# Patient Record
Sex: FEMALE | Race: WHITE | NOT HISPANIC OR LATINO | Employment: FULL TIME | URBAN - METROPOLITAN AREA
[De-identification: names, ages, dates, MRNs, and addresses within clinical notes are randomized per-mention and may not be internally consistent; named-entity substitution may affect disease eponyms.]

---

## 2017-03-13 ENCOUNTER — OFFICE VISIT (OUTPATIENT)
Dept: PSYCHIATRY | Facility: CLINIC | Age: 27
End: 2017-03-13
Payer: COMMERCIAL

## 2017-03-13 VITALS
SYSTOLIC BLOOD PRESSURE: 117 MMHG | HEIGHT: 62 IN | WEIGHT: 127.63 LBS | BODY MASS INDEX: 23.49 KG/M2 | DIASTOLIC BLOOD PRESSURE: 78 MMHG | HEART RATE: 79 BPM

## 2017-03-13 DIAGNOSIS — F41.8 DEPRESSION WITH ANXIETY: Primary | ICD-10-CM

## 2017-03-13 PROCEDURE — 90792 PSYCH DIAG EVAL W/MED SRVCS: CPT | Mod: S$GLB,,, | Performed by: PSYCHIATRY & NEUROLOGY

## 2017-03-13 PROCEDURE — 99999 PR PBB SHADOW E&M-NEW PATIENT-LVL II: CPT | Mod: PBBFAC,,, | Performed by: PSYCHIATRY & NEUROLOGY

## 2017-03-13 RX ORDER — FLUOXETINE HYDROCHLORIDE 20 MG/1
20 CAPSULE ORAL DAILY
Qty: 30 CAPSULE | Refills: 5 | Status: SHIPPED | OUTPATIENT
Start: 2017-03-13 | End: 2017-04-17 | Stop reason: SDUPTHER

## 2017-03-14 NOTE — PROGRESS NOTES
I have independently evaluated the patient and have reviewed this note and agree with its contents, the assessment and plan.

## 2017-03-30 ENCOUNTER — PATIENT MESSAGE (OUTPATIENT)
Dept: PSYCHIATRY | Facility: CLINIC | Age: 27
End: 2017-03-30

## 2017-04-17 ENCOUNTER — OFFICE VISIT (OUTPATIENT)
Dept: PSYCHIATRY | Facility: CLINIC | Age: 27
End: 2017-04-17
Payer: COMMERCIAL

## 2017-04-17 VITALS
SYSTOLIC BLOOD PRESSURE: 120 MMHG | HEIGHT: 62 IN | HEART RATE: 81 BPM | WEIGHT: 120 LBS | BODY MASS INDEX: 22.08 KG/M2 | DIASTOLIC BLOOD PRESSURE: 73 MMHG

## 2017-04-17 DIAGNOSIS — F41.1 GAD (GENERALIZED ANXIETY DISORDER): Primary | ICD-10-CM

## 2017-04-17 PROCEDURE — 99214 OFFICE O/P EST MOD 30 MIN: CPT | Mod: S$GLB,,, | Performed by: PSYCHIATRY & NEUROLOGY

## 2017-04-17 PROCEDURE — 99999 PR PBB SHADOW E&M-EST. PATIENT-LVL II: CPT | Mod: PBBFAC,,, | Performed by: PSYCHIATRY & NEUROLOGY

## 2017-04-17 RX ORDER — BUPROPION HYDROCHLORIDE 75 MG/1
75 TABLET ORAL DAILY
Qty: 30 TABLET | Refills: 5 | Status: SHIPPED | OUTPATIENT
Start: 2017-04-17 | End: 2017-06-19 | Stop reason: SDUPTHER

## 2017-04-17 RX ORDER — FLUOXETINE HYDROCHLORIDE 20 MG/1
20 CAPSULE ORAL DAILY
Qty: 30 CAPSULE | Refills: 5 | Status: SHIPPED | OUTPATIENT
Start: 2017-04-17 | End: 2017-06-19 | Stop reason: SDUPTHER

## 2017-04-17 NOTE — PROGRESS NOTES
"Outpatient Psychiatry Initial Visit (MD/NP)    4/17/2017    Linda Rich, a 26 y.o. female, presenting for initial evaluation visit. Met with patient.    Reason for Encounter: self-referral. Patient complains of generalized anxiety. Pt here for follow up.     History of Present Illness: Pt states she is feeling good on prozac, now stating she is unable to reach orgasm. Is still sexually interested otherwise. States otherwise physical feelings of anxiety are "much less" and feels more relaxed and able to relax and enjoy herself when appropriate. Willing to try wellbutrin for sexual side effects.     Pt now anxious about having to go back to work tmrw after easter break. Would run through scenarios of things going wrong with work, her routine (she needs routine), once these started she would spiral into anxiety about everything from dog to random concerns. Reviewed CBT to address this.     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 No family psych hx. No hx of severe depression, kimberlee, no ADHD, no OCD.   Medical hx: none, no head injuries  Allergies: NKDA  Trauma: none  Legal: none  Substance: one drink on weekends, no tobacco, in HS tried marijuana but not currently using.       Review Of Systems:     Psychiatric Review Of Systems - Is patient experiencing or having changes in:  sleep: no  appetite: no, no ED behaviors  weight: no  energy/anergy: yes, pt states she is often sleepy  interest/pleasure/anhedonia: yes, not now  somatic symptoms: yes, stomach pains  libido: yes, decreased when anxious  anxiety/panic: yes  guilty/hopelessness: yes, not now  concentration: no  S.I.B.s/risky behavior: no  Irritability: yes  Racing thoughts: no  Impulsive behaviors: no  Paranoia:no  AVH:no      Current Evaluation: " "    Nutritional Screening: Considering the patient's height and weight, medications, medical history and preferences, should a referral be made to the dietitian? no    Constitutional  Vitals:  Most recent vital signs, dated less than 90 days prior to this appointment, were reviewed.    Vitals:    04/17/17 1041   BP: 120/73   Pulse: 81   Weight: 54.4 kg (120 lb)   Height: 5' 2" (1.575 m)        General:  unremarkable, age appropriate     Musculoskeletal  Muscle Strength/Tone:  no dyskinesia, no dystonia   Gait & Station:  non-ataxic     Psychiatric  Speech:  no latency; no press   Mood & Affect:  steady  congruent and appropriate   Thought Process:  normal and logical   Associations:  intact   Thought Content:  normal, no suicidality, no homicidality, delusions, or paranoia   Insight:  intact   Judgement: behavior is adequate to circumstances   Orientation:  grossly intact   Memory: intact for content of interview   Language: grossly intact   Attention Span & Concentration:  able to focus   Fund of Knowledge:  intact and appropriate to age and level of education       Relevant Elements of Neurological Exam: normal gait      Laboratory Data  No results found for any previous visit.      Medications  Outpatient Encounter Prescriptions as of 4/17/2017   Medication Sig Dispense Refill    fluoxetine (PROZAC) 20 MG capsule Take 1 capsule (20 mg total) by mouth once daily. 30 capsule 5     No facility-administered encounter medications on file as of 4/17/2017.            Assessment - Diagnosis - Goals:     Impression: Anxiety with depression, in partial remission.       Strengths and Liabilities: Strength: Patient accepts guidance/feedback, Strength: Patient is expressive/articulate., Strength: Patient is intelligent., Strength: Patient is motivated for change., Strength: Patient is physically healthy.    Treatment Goals:  Specify outcomes written in observable, behavioral terms:   Anxiety: acquiring relapse prevention " skills, reducing negative automatic thoughts and reducing physical symptoms of anxiety    Treatment Plan/Recommendations:   - continue prozac 20mg PO daily.  Discussed potential for GI side effects, sexual dysfunction, mood destabilization, headaches.   - Add wellbutrin 75mg for sexual side effects.   - Discussed CBT, pt interested and has never tried therapy, has started with CBT textbook.   - Discussed resident transition.     Return to Clinic: 6 weeks    Counseling time: 20  Total time: 50  Consulting clinician was informed of the encounter and consult note.

## 2017-04-18 NOTE — PROGRESS NOTES
This encounter was reviewed by me and case was discussed with the resident physician. I agree with the assessment and  treatment plan as stated, apart from title of initial eval. It was a follow up visit.

## 2017-06-19 ENCOUNTER — OFFICE VISIT (OUTPATIENT)
Dept: PSYCHIATRY | Facility: CLINIC | Age: 27
End: 2017-06-19
Payer: COMMERCIAL

## 2017-06-19 VITALS
BODY MASS INDEX: 21.9 KG/M2 | HEART RATE: 90 BPM | WEIGHT: 119 LBS | HEIGHT: 62 IN | SYSTOLIC BLOOD PRESSURE: 115 MMHG | DIASTOLIC BLOOD PRESSURE: 74 MMHG

## 2017-06-19 DIAGNOSIS — F41.8 ANXIETY ASSOCIATED WITH DEPRESSION: Primary | ICD-10-CM

## 2017-06-19 PROCEDURE — 99999 PR PBB SHADOW E&M-EST. PATIENT-LVL II: CPT | Mod: PBBFAC,,, | Performed by: PSYCHIATRY & NEUROLOGY

## 2017-06-19 PROCEDURE — 99214 OFFICE O/P EST MOD 30 MIN: CPT | Mod: S$GLB,,, | Performed by: PSYCHIATRY & NEUROLOGY

## 2017-06-19 RX ORDER — FLUOXETINE HYDROCHLORIDE 20 MG/1
20 CAPSULE ORAL DAILY
Qty: 30 CAPSULE | Refills: 5 | Status: SHIPPED | OUTPATIENT
Start: 2017-06-19 | End: 2017-10-13 | Stop reason: SDUPTHER

## 2017-06-19 RX ORDER — BUPROPION HYDROCHLORIDE 75 MG/1
75 TABLET ORAL DAILY
Qty: 30 TABLET | Refills: 5 | Status: SHIPPED | OUTPATIENT
Start: 2017-06-19 | End: 2017-10-13 | Stop reason: SINTOL

## 2017-06-19 NOTE — PROGRESS NOTES
Outpatient Psychiatry follow up Visit (MD/NP)    6/19/2017    Linda Rich, a 27 y.o. female, presenting for follow up. Met with patient.    Reason for Encounter: self-referral. Patient complains of generalized anxiety. Pt here for follow up.     History of Present Illness:  At last visit pt was started on wellbutrin 75mg to address anorgasmia 2/2 prozac, was otherwise finding prozac to be very helpful for her generalized anxiety. Pt states wellbutrin initially caused minor tremor which resolved, now feels it is helping very much with her anorgasmia and also is improving her energy levels.     Pt is currently on summer vacation from school, just went on a group vacation with 11 people which she thought would be socially stressful but it was not. In general has found CBT concepts to be helpful for her anxiety.     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 No family psych hx. No hx of severe depression, kimberlee, no ADHD, no OCD.   Medical hx: none, no head injuries  Allergies: NKDA  Trauma: none  Legal: none  Substance: one drink on weekends, no tobacco, in HS tried marijuana but not currently using.       Review Of Systems:     Psychiatric Review Of Systems - Is patient experiencing or having changes in:  sleep: no  appetite: no, no ED behaviors  weight: no  energy/anergy: yes, pt states she is often sleepy  interest/pleasure/anhedonia: yes, not now  somatic symptoms: yes, stomach pains  libido: yes, decreased when anxious  anxiety/panic: yes  guilty/hopelessness: yes, not now  concentration: no  S.I.B.s/risky behavior: no  Irritability: yes  Racing thoughts: no  Impulsive behaviors: no  Paranoia:no  AVH:no      Current Evaluation:     Nutritional Screening: Considering the patient's height and weight, medications,  "medical history and preferences, should a referral be made to the dietitian? no    Constitutional  Vitals:  Most recent vital signs, dated less than 90 days prior to this appointment, were reviewed.    Vitals:    06/19/17 1032   BP: 115/74   Pulse: 90   Weight: 54 kg (119 lb)   Height: 5' 2" (1.575 m)        General:  unremarkable, age appropriate     Musculoskeletal  Muscle Strength/Tone:  no dyskinesia, no dystonia   Gait & Station:  non-ataxic     Psychiatric  Speech:  no latency; no press   Mood & Affect:  steady  congruent and appropriate   Thought Process:  normal and logical   Associations:  intact   Thought Content:  normal, no suicidality, no homicidality, delusions, or paranoia   Insight:  intact   Judgement: behavior is adequate to circumstances   Orientation:  grossly intact   Memory: intact for content of interview   Language: grossly intact   Attention Span & Concentration:  able to focus   Fund of Knowledge:  intact and appropriate to age and level of education       Relevant Elements of Neurological Exam: normal gait      Laboratory Data  No visits with results within 1 Month(s) from this visit.   Latest known visit with results is:   No results found for any previous visit.         Medications  Outpatient Encounter Prescriptions as of 6/19/2017   Medication Sig Dispense Refill    buPROPion (WELLBUTRIN) 75 MG tablet Take 1 tablet (75 mg total) by mouth once daily. 30 tablet 5    fluoxetine (PROZAC) 20 MG capsule Take 1 capsule (20 mg total) by mouth once daily. 30 capsule 5     No facility-administered encounter medications on file as of 6/19/2017.            Assessment - Diagnosis - Goals:     Impression: Anxiety with depression, in partial remission.       Strengths and Liabilities: Strength: Patient accepts guidance/feedback, Strength: Patient is expressive/articulate., Strength: Patient is intelligent., Strength: Patient is motivated for change., Strength: Patient is physically " healthy.    Treatment Goals:  Specify outcomes written in observable, behavioral terms:   Anxiety: acquiring relapse prevention skills, reducing negative automatic thoughts and reducing physical symptoms of anxiety    Treatment Plan/Recommendations:   - continue prozac 20mg PO daily.  Discussed potential for GI side effects, sexual dysfunction, mood destabilization, headaches.   - Add wellbutrin 75mg for sexual side effects.   - Discussed CBT, pt interested and has never tried therapy, has started with CBT textbook, will seek outside therapist.   - Discussed resident transition.     Return to Clinic: 6 weeks to 3 months    Counseling time: 20  Total time: 30  Consulting clinician was informed of the encounter and consult note.

## 2017-10-13 ENCOUNTER — OFFICE VISIT (OUTPATIENT)
Dept: PSYCHIATRY | Facility: CLINIC | Age: 27
End: 2017-10-13
Payer: COMMERCIAL

## 2017-10-13 VITALS
WEIGHT: 124.19 LBS | DIASTOLIC BLOOD PRESSURE: 72 MMHG | BODY MASS INDEX: 22.85 KG/M2 | HEART RATE: 88 BPM | SYSTOLIC BLOOD PRESSURE: 121 MMHG | HEIGHT: 62 IN

## 2017-10-13 DIAGNOSIS — F41.8 ANXIETY WITH DEPRESSION: Primary | ICD-10-CM

## 2017-10-13 DIAGNOSIS — F41.0 PANIC DISORDER WITHOUT AGORAPHOBIA WITH MILD PANIC ATTACKS: ICD-10-CM

## 2017-10-13 PROCEDURE — 99214 OFFICE O/P EST MOD 30 MIN: CPT | Mod: S$GLB,,, | Performed by: PSYCHIATRY & NEUROLOGY

## 2017-10-13 PROCEDURE — 99999 PR PBB SHADOW E&M-EST. PATIENT-LVL II: CPT | Mod: PBBFAC,,, | Performed by: PSYCHIATRY & NEUROLOGY

## 2017-10-13 RX ORDER — HYDROXYZINE PAMOATE 25 MG/1
25 CAPSULE ORAL EVERY 6 HOURS PRN
Qty: 60 CAPSULE | Refills: 1 | Status: SHIPPED | OUTPATIENT
Start: 2017-10-13 | End: 2017-10-13 | Stop reason: SDUPTHER

## 2017-10-13 RX ORDER — FLUOXETINE 10 MG/1
10 CAPSULE ORAL DAILY
Qty: 30 CAPSULE | Refills: 1 | Status: SHIPPED | OUTPATIENT
Start: 2017-10-13 | End: 2017-10-13 | Stop reason: SDUPTHER

## 2017-10-13 RX ORDER — BUPROPION HYDROCHLORIDE 150 MG/1
150 TABLET ORAL DAILY
Qty: 30 TABLET | Refills: 2 | Status: SHIPPED | OUTPATIENT
Start: 2017-10-13 | End: 2018-01-24 | Stop reason: SDUPTHER

## 2017-10-13 RX ORDER — HYDROXYZINE PAMOATE 25 MG/1
25 CAPSULE ORAL EVERY 6 HOURS PRN
Qty: 60 CAPSULE | Refills: 2 | Status: SHIPPED | OUTPATIENT
Start: 2017-10-13 | End: 2020-05-28

## 2017-10-13 RX ORDER — BUPROPION HYDROCHLORIDE 150 MG/1
150 TABLET ORAL DAILY
Qty: 30 TABLET | Refills: 1 | Status: SHIPPED | OUTPATIENT
Start: 2017-10-13 | End: 2017-10-13 | Stop reason: SDUPTHER

## 2017-10-13 RX ORDER — FLUOXETINE HYDROCHLORIDE 20 MG/1
40 CAPSULE ORAL DAILY
Qty: 30 CAPSULE | Refills: 1 | Status: SHIPPED | OUTPATIENT
Start: 2017-10-13 | End: 2017-10-13 | Stop reason: SDUPTHER

## 2017-10-13 RX ORDER — FLUOXETINE HYDROCHLORIDE 20 MG/1
40 CAPSULE ORAL DAILY
Qty: 30 CAPSULE | Refills: 2 | Status: SHIPPED | OUTPATIENT
Start: 2017-10-13 | End: 2017-10-13 | Stop reason: SDUPTHER

## 2017-10-13 RX ORDER — FLUOXETINE HYDROCHLORIDE 20 MG/1
20 CAPSULE ORAL DAILY
Qty: 30 CAPSULE | Refills: 2 | Status: SHIPPED | OUTPATIENT
Start: 2017-10-13 | End: 2018-02-16 | Stop reason: SDUPTHER

## 2017-10-13 RX ORDER — FLUOXETINE 10 MG/1
10 CAPSULE ORAL DAILY
Qty: 30 CAPSULE | Refills: 2 | Status: SHIPPED | OUTPATIENT
Start: 2017-10-13 | End: 2018-03-09 | Stop reason: DRUGHIGH

## 2017-10-14 PROBLEM — F41.0 PANIC DISORDER WITHOUT AGORAPHOBIA WITH MILD PANIC ATTACKS: Status: ACTIVE | Noted: 2017-10-14

## 2017-10-14 PROBLEM — F41.8 ANXIETY WITH DEPRESSION: Status: ACTIVE | Noted: 2017-10-14

## 2017-10-14 NOTE — PROGRESS NOTES
"Outpatient Psychiatry follow up Visit (MD/NP)    10/13/2017    Linda Rich, a 27 y.o. female, presenting for follow up. Met with patient.    Reason for Encounter: self-referral. Patient complains of generalized anxiety. Pt here for follow up.     History of Present Illness:    Pt transferred from Dr. Cotter to Dr. Day pursuant to resident transition.  Chart reviewed prior to visit.      At last visit, pt was continued on Prozac 20 mg qd for anxiety and depression and Wellbutrin 75 mg for anorgasmia 2/2 SSRI.  Pt has been compliant with her medications without new SE/AE.  Pt has found CBT techniques helpful with managing her negative thoughts.      Pt reports increasing anxiety and panic attacks x1 month since school started again.  Pt is a  at [employer].  She reports "up and down" mood and feels the medications are not working as well.  Pt has been feeling "really tired and down."  Denies possibility of pregnancy.  Pt with h/o NSSIB.  Reports feeling "like I did when I used to cut" but denies urge to cut or recent cutting.  Pt last self-harmed in high school with cuts to arms and thighs.  Pt had only a few panic attacks over the summer.  She describes her first panic attack in over a month which occurred 5 days ago triggered by work expectations.  She missed school the next day as a result of anxiety.  (+) tingling, dizziness, nausea, feeling out of control, crying and severe self-loathing.  Episodes last 1/2-1 hour and resolve with exhaustion and going to sleep.  Past triggers include crowded environments, stress related to perceived loss of control or not being to resolve situations immediately.  Pt sleeps "too much," eight hours nightly with 1-2 hour nap in the afternoon.  She is exhausted at the end of each work day.  Pt with h/o mild-moderate restrictive eating pattern during January-June 2017.  She admits to eating at least once a day and lost only three pounds in " "six months.  Denies bingeing or purging behavior or h/o severe weight loss.  Now, pt is practicing "intuitive eating" where nourishment is led by appetite.   Appetite is stable.  Denies SI, HI, AVH, paranoia.  Pt is requesting increase in Prozac but worries about increase in sexual side effects.      Per chart review:  Medical hx: none, no head injuries  Allergies: NKDA    Psychiatic hx:   No hx of severe depression, kimberlee, no ADHD, no OCD.   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     Legal: none    Review Of Systems:     Psychiatric Review Of Systems - Is patient experiencing or having changes in:  sleep: yes, hypersomnia  appetite: no, h/o restrictive ED behaviors  weight: no  energy/anergy: yes, tired  interest/pleasure/anhedonia: no  somatic symptoms: yes, stomach pains  libido: yes, decreased despite initial relief with Wellbutrin 75 mg qam  anxiety/panic: yes, increased x1 month  guilty/hopelessness: no  concentration: no  S.I.B.s/risky behavior: no  Irritability: denies  Racing thoughts: no  Impulsive behaviors: no  Paranoia: no  AVH: no    Current Evaluation:     Nutritional Screening: Considering the patient's height and weight, medications, medical history and preferences, should a referral be made to the dietitian? no    Constitutional  Vitals:  Most recent vital signs, dated less than 90 days prior to this appointment, were reviewed.    Vitals:    10/13/17 1603   BP: 121/72   Pulse: 88   Weight: 56.3 kg (124 lb 3.2 oz)   Height: 5' 2" (1.575 m)        General:  unremarkable, age appropriate     Musculoskeletal  Muscle Strength/Tone:  no dyskinesia, no " dystonia   Gait & Station:  non-ataxic     Psychiatric  Speech:  no latency; no press   Mood & Affect:  steady  congruent and appropriate   Thought Process:  normal and logical   Associations:  intact   Thought Content:  normal, no suicidality, no homicidality, delusions, or paranoia   Insight:  intact   Judgement: behavior is adequate to circumstances   Orientation:  grossly intact   Memory: intact for content of interview   Language: grossly intact   Attention Span & Concentration:  able to focus   Fund of Knowledge:  intact and appropriate to age and level of education       Relevant Elements of Neurological Exam: normal gait      Laboratory Data  No visits with results within 1 Month(s) from this visit.   Latest known visit with results is:   No results found for any previous visit.         Medications  Outpatient Encounter Prescriptions as of 10/13/2017   Medication Sig Dispense Refill    buPROPion (WELLBUTRIN XL) 150 MG TB24 tablet Take 1 tablet (150 mg total) by mouth once daily. 30 tablet 2    fluoxetine (PROZAC) 10 MG capsule Take 1 capsule (10 mg total) by mouth once daily. 30 capsule 2    fluoxetine (PROZAC) 20 MG capsule Take 1 capsule (20 mg total) by mouth once daily. 30 capsule 2    hydrOXYzine pamoate (VISTARIL) 25 MG Cap Take 1 capsule (25 mg total) by mouth every 6 (six) hours as needed (anxiety or insomnia). 60 capsule 2    [DISCONTINUED] buPROPion (WELLBUTRIN XL) 150 MG TB24 tablet Take 1 tablet (150 mg total) by mouth once daily. 30 tablet 1    [DISCONTINUED] buPROPion (WELLBUTRIN) 75 MG tablet Take 1 tablet (75 mg total) by mouth once daily. 30 tablet 5    [DISCONTINUED] fluoxetine (PROZAC) 10 MG capsule Take 1 capsule (10 mg total) by mouth once daily. 30 capsule 1    [DISCONTINUED] fluoxetine (PROZAC) 20 MG capsule Take 1 capsule (20 mg total) by mouth once daily. 30 capsule 5    [DISCONTINUED] fluoxetine (PROZAC) 20 MG capsule Take 2 capsules (40 mg total) by mouth once daily. 30  capsule 1    [DISCONTINUED] fluoxetine (PROZAC) 20 MG capsule Take 2 capsules (40 mg total) by mouth once daily. 30 capsule 2    [DISCONTINUED] hydrOXYzine pamoate (VISTARIL) 25 MG Cap Take 1 capsule (25 mg total) by mouth every 6 (six) hours as needed (anxiety or insomnia). 60 capsule 1     No facility-administered encounter medications on file as of 10/13/2017.            Assessment - Diagnosis - Goals:     Impression: Anxiety with depression, in partial remission.       Strengths and Liabilities: Strength: Patient accepts guidance/feedback, Strength: Patient is expressive/articulate., Strength: Patient is intelligent., Strength: Patient is motivated for change., Strength: Patient is physically healthy.    Treatment Goals:  Specify outcomes written in observable, behavioral terms:   Anxiety: acquiring relapse prevention skills, reducing negative automatic thoughts and reducing physical symptoms of anxiety    Treatment Plan/Recommendations:   - continue prozac 20mg PO daily.  Discussed potential for GI side effects, sexual dysfunction, mood destabilization, headaches.   - Add wellbutrin 75mg for sexual side effects.   - Discussed CBT, pt interested and has never tried therapy, has started with CBT textbook, will seek outside therapist.   - Discussed resident transition.     Return to Clinic: 6 weeks to 3 months    Counseling time: 20  Total time: 30  Consulting clinician was informed of the encounter and consult note.

## 2017-10-15 NOTE — PROGRESS NOTES
"Outpatient Psychiatry follow up Visit (MD/NP)    10/13/2017    Linda Rich, a 27 y.o. female, presenting for follow up. Met with patient.    Reason for Encounter:  Self-referral. Patient complains of generalized anxiety. Pt here for follow-up.     History of Present Illness:    Pt transferred from Dr. Cotter to Dr. Day pursuant to resident transition.  Chart reviewed prior to visit.      At last visit, pt was continued on Prozac 20 mg qd for anxiety and depression and Wellbutrin 75 mg for anorgasmia 2/2 SSRI.  Pt has been compliant with her medications without new SE/AE.  Pt has found CBT techniques helpful with managing her negative thoughts.      Pt reports increasing anxiety and panic attacks x1 month since school started again.  Pt is a  at [employer].  She reports "up and down" mood and feels the medications are not working as well.  Pt has been feeling "really tired and down."  Denies possibility of pregnancy.  Pt with h/o NSSIB.  Reports feeling "like I did when I used to cut" but denies urge to cut or recent cutting.  Pt last self-harmed in high school with cuts to arms and thighs.  Pt had only a few panic attacks over the summer.  She describes her first panic attack in over a month which occurred 5 days ago triggered by work expectations.  She missed school the next day as a result of anxiety.  (+) tingling, dizziness, nausea, feeling out of control, crying and severe self-loathing.  Episodes last 1/2-1 hour and resolve with exhaustion and going to sleep.  Past triggers include crowded environments, stress related to perceived loss of control or not being to resolve situations immediately.  Pt sleeps "too much," eight hours nightly with 1-2 hour nap in the afternoon.  She is exhausted at the end of each work day.  Pt with h/o mild-moderate restrictive eating pattern during January-June 2017.  She admits to eating at least once a day and lost only three pounds in " "six months.  Denies bingeing or purging behavior or h/o severe weight loss.  Now, pt is practicing "intuitive eating" where nourishment is led by appetite.   Appetite is stable.  Denies SI, HI, AVH, paranoia.  Pt is requesting increase in Prozac but worries about increase in sexual side effects.  Pt reports extreme exhaustion when Wellbutrin "seems to wear off" and needing to nap or drink coffee in the evening just to function (denies moodiness).      Per chart review:  Medical hx: none, no head injuries  Allergies: NKDA    Psychiatic hx:   No hx of severe depression, kimberlee, no ADHD, no OCD.   No psych hx or contacts  (+) NSSIB in high school  No hx active SI  No psych meds  Trauma: none    Social hx:   Lives with , from [facility] in education, no learning problems  Works as  at [email or web address]  No kids, no plans for kids any time soon    Family psych hx: none    Substance: one drink on weekends, no tobacco, in HS tried marijuana but not currently using.     Legal: none    Review Of Systems:     Psychiatric Review Of Systems - Is patient experiencing or having changes in:  sleep: yes, hypersomnia  appetite: no, h/o restrictive ED behaviors  weight: no  energy/anergy: yes, tired  interest/pleasure/anhedonia: no  somatic symptoms: yes, stomach pains  libido: yes, decreased despite initial relief with Wellbutrin 75 mg qam  anxiety/panic: yes, increased x1 month  guilty/hopelessness: no  concentration: no  S.I.B.s/risky behavior: no  Irritability: denies  Racing thoughts: no  Impulsive behaviors: no  Paranoia: no  AVH: no    Current Evaluation:     Constitutional  Vitals:  Most recent vital signs, dated less than 90 days prior to this appointment, were reviewed.    Vitals:    10/13/17 1603   BP: 121/72   Pulse: 88   Weight: 56.3 kg (124 lb 3.2 oz)   Height: 5' 2" (1.575 m)        General:  unremarkable, age appropriate     Musculoskeletal  Muscle Strength/Tone:  no dyskinesia, no dystonia " "  Gait & Station:  non-ataxic     Psychiatric  Speech:  no latency; no press   Mood & Affect:  "up and down"  congruent and appropriate   Thought Process:  normal and logical   Associations:  intact   Thought Content:  normal, no suicidality, no homicidality, delusions, or paranoia   Insight:  intact   Judgement: behavior is adequate to circumstances   Orientation:  grossly intact   Memory: intact for content of interview   Language: grossly intact   Attention Span & Concentration:  able to focus   Fund of Knowledge:  intact and appropriate to age and level of education       Laboratory Data  No lab results in the past 90 days.    Medications  Outpatient Encounter Prescriptions as of 10/13/2017   Medication Sig Dispense Refill    buPROPion (WELLBUTRIN XL) 150 MG TB24 tablet Take 1 tablet (150 mg total) by mouth once daily. 30 tablet 2    fluoxetine (PROZAC) 10 MG capsule Take 1 capsule (10 mg total) by mouth once daily. 30 capsule 2    fluoxetine (PROZAC) 20 MG capsule Take 1 capsule (20 mg total) by mouth once daily. 30 capsule 2    hydrOXYzine pamoate (VISTARIL) 25 MG Cap Take 1 capsule (25 mg total) by mouth every 6 (six) hours as needed (anxiety or insomnia). 60 capsule 2    [DISCONTINUED] buPROPion (WELLBUTRIN XL) 150 MG TB24 tablet Take 1 tablet (150 mg total) by mouth once daily. 30 tablet 1    [DISCONTINUED] buPROPion (WELLBUTRIN) 75 MG tablet Take 1 tablet (75 mg total) by mouth once daily. 30 tablet 5    [DISCONTINUED] fluoxetine (PROZAC) 10 MG capsule Take 1 capsule (10 mg total) by mouth once daily. 30 capsule 1    [DISCONTINUED] fluoxetine (PROZAC) 20 MG capsule Take 1 capsule (20 mg total) by mouth once daily. 30 capsule 5    [DISCONTINUED] fluoxetine (PROZAC) 20 MG capsule Take 2 capsules (40 mg total) by mouth once daily. 30 capsule 1    [DISCONTINUED] fluoxetine (PROZAC) 20 MG capsule Take 2 capsules (40 mg total) by mouth once daily. 30 capsule 2    [DISCONTINUED] hydrOXYzine pamoate " (VISTARIL) 25 MG Cap Take 1 capsule (25 mg total) by mouth every 6 (six) hours as needed (anxiety or insomnia). 60 capsule 1     No facility-administered encounter medications on file as of 10/13/2017.        Assessment - Diagnosis - Goals:     Impression: Anxiety with depression, currently active    Diagnoses:  Encounter Diagnoses   Name Primary?    Anxiety with depression Yes    Panic disorder without agoraphobia with mild panic attacks      Treatment Plan/Recommendations:   - Increase Prozac from 20 mg to 30 mg daily for anxiety and depression.  Discussed potential for GI side effects, sexual dysfunction, mood destabilization, headaches.   - Increase Wellbutrin 75 mg qd for sexual side effects to Wellbutrin  mg qd for sexual side effects, depression and fatigue.   - Psychotherapy referral for anxiety and depression.    Return to Clinic: 6 weeks    Jose Day MD  [facility]  PGY-3  Pager:  [phone]

## 2017-11-17 NOTE — PROGRESS NOTES
STAFF COMMENTS: I have discussed pt with Dr. Day and reviewed the history and exam. I agree and concur with the assessment and plan.  Agree with increase of wellbutrin and prozac.

## 2018-01-19 RX ORDER — BUPROPION HYDROCHLORIDE 150 MG/1
150 TABLET ORAL DAILY
Qty: 30 TABLET | Refills: 2 | Status: CANCELLED | OUTPATIENT
Start: 2018-01-19 | End: 2019-01-19

## 2018-01-26 RX ORDER — BUPROPION HYDROCHLORIDE 150 MG/1
150 TABLET ORAL DAILY
Qty: 30 TABLET | Refills: 2 | Status: SHIPPED | OUTPATIENT
Start: 2018-01-26 | End: 2018-03-09 | Stop reason: SDUPTHER

## 2018-01-26 RX ORDER — BUPROPION HYDROCHLORIDE 150 MG/1
150 TABLET ORAL DAILY
Qty: 30 TABLET | Refills: 2 | Status: SHIPPED | OUTPATIENT
Start: 2018-01-26 | End: 2018-01-26 | Stop reason: SDUPTHER

## 2018-02-09 ENCOUNTER — OFFICE VISIT (OUTPATIENT)
Dept: URGENT CARE | Facility: CLINIC | Age: 28
End: 2018-02-09
Payer: COMMERCIAL

## 2018-02-09 VITALS
SYSTOLIC BLOOD PRESSURE: 110 MMHG | DIASTOLIC BLOOD PRESSURE: 72 MMHG | OXYGEN SATURATION: 98 % | HEIGHT: 63 IN | BODY MASS INDEX: 21.26 KG/M2 | WEIGHT: 120 LBS | HEART RATE: 90 BPM | RESPIRATION RATE: 18 BRPM | TEMPERATURE: 99 F

## 2018-02-09 DIAGNOSIS — J40 BRONCHITIS: Primary | ICD-10-CM

## 2018-02-09 DIAGNOSIS — J32.9 SINUSITIS, UNSPECIFIED CHRONICITY, UNSPECIFIED LOCATION: ICD-10-CM

## 2018-02-09 PROCEDURE — 3008F BODY MASS INDEX DOCD: CPT | Mod: S$GLB,,, | Performed by: PHYSICIAN ASSISTANT

## 2018-02-09 PROCEDURE — 99203 OFFICE O/P NEW LOW 30 MIN: CPT | Mod: S$GLB,,, | Performed by: PHYSICIAN ASSISTANT

## 2018-02-09 PROCEDURE — 96372 THER/PROPH/DIAG INJ SC/IM: CPT | Mod: S$GLB,,, | Performed by: FAMILY MEDICINE

## 2018-02-09 RX ORDER — CEFDINIR 300 MG/1
300 CAPSULE ORAL 2 TIMES DAILY
Qty: 14 CAPSULE | Refills: 0 | Status: SHIPPED | OUTPATIENT
Start: 2018-02-09 | End: 2018-02-16

## 2018-02-09 RX ORDER — FLUCONAZOLE 150 MG/1
150 TABLET ORAL ONCE
Qty: 1 TABLET | Refills: 0 | Status: SHIPPED | OUTPATIENT
Start: 2018-02-09 | End: 2018-02-09

## 2018-02-09 RX ORDER — BETAMETHASONE SODIUM PHOSPHATE AND BETAMETHASONE ACETATE 3; 3 MG/ML; MG/ML
9 INJECTION, SUSPENSION INTRA-ARTICULAR; INTRALESIONAL; INTRAMUSCULAR; SOFT TISSUE ONCE
Status: COMPLETED | OUTPATIENT
Start: 2018-02-09 | End: 2018-02-09

## 2018-02-09 RX ADMIN — BETAMETHASONE SODIUM PHOSPHATE AND BETAMETHASONE ACETATE 9 MG: 3; 3 INJECTION, SUSPENSION INTRA-ARTICULAR; INTRALESIONAL; INTRAMUSCULAR; SOFT TISSUE at 04:02

## 2018-02-09 NOTE — PROGRESS NOTES
"Subjective:       Patient ID: Linda Rich is a 27 y.o. female.    Vitals:  height is 5' 3" (1.6 m) and weight is 54.4 kg (120 lb). Her temperature is 98.5 °F (36.9 °C). Her blood pressure is 110/72 and her pulse is 90. Her respiration is 18 and oxygen saturation is 98%.     Chief Complaint: URI    URI    This is a new problem. The current episode started in the past 7 days. The problem has been gradually worsening. There has been no fever. Associated symptoms include chest pain, coughing, nausea, rhinorrhea, sinus pain, sneezing, a sore throat and wheezing. Pertinent negatives include no abdominal pain, congestion, diarrhea, ear pain, headaches, neck pain, rash or vomiting. She has tried nothing for the symptoms. The treatment provided no relief.     Review of Systems   Constitution: Positive for malaise/fatigue. Negative for chills and fever.   HENT: Positive for rhinorrhea, sinus pain, sneezing and sore throat. Negative for congestion, ear pain and hoarse voice.    Eyes: Negative for blurred vision, discharge, pain, redness and visual disturbance.   Cardiovascular: Positive for chest pain. Negative for dyspnea on exertion, leg swelling, near-syncope and syncope.   Respiratory: Positive for cough, shortness of breath, sputum production and wheezing.    Hematologic/Lymphatic: Negative for adenopathy.   Skin: Negative for itching and rash.   Musculoskeletal: Negative for back pain, myalgias, neck pain and stiffness.   Gastrointestinal: Positive for nausea. Negative for abdominal pain, diarrhea and vomiting.   Neurological: Negative for dizziness, headaches, light-headedness and numbness.   Psychiatric/Behavioral: Negative for altered mental status.   Allergic/Immunologic: Negative for hives.   All other systems reviewed and are negative.      Objective:      Physical Exam   Constitutional: She is oriented to person, place, and time. She appears well-developed and well-nourished.  Non-toxic appearance. She has " a sickly appearance. She does not appear ill. No distress.   HENT:   Head: Normocephalic and atraumatic.   Right Ear: Tympanic membrane, external ear and ear canal normal.   Left Ear: Tympanic membrane, external ear and ear canal normal.   Nose: Mucosal edema present. No epistaxis. Right sinus exhibits maxillary sinus tenderness. Right sinus exhibits no frontal sinus tenderness. Left sinus exhibits maxillary sinus tenderness. Left sinus exhibits no frontal sinus tenderness.   Mouth/Throat: Uvula is midline and mucous membranes are normal. No uvula swelling. Posterior oropharyngeal erythema present.   Eyes: Pupils are equal, round, and reactive to light.   Neck: Normal range of motion. Neck supple.   Cardiovascular: Normal rate, regular rhythm and normal heart sounds.  Exam reveals no gallop and no friction rub.    No murmur heard.  Pulmonary/Chest: Effort normal. No respiratory distress. She has no decreased breath sounds. She has no wheezes. She has rhonchi in the right middle field and the left middle field. She has no rales. She exhibits no tenderness and no crepitus.   Musculoskeletal: Normal range of motion.   Lymphadenopathy:        Head (right side): No submental, no submandibular, no tonsillar, no preauricular, no posterior auricular and no occipital adenopathy present.        Head (left side): No submental, no submandibular, no tonsillar, no preauricular, no posterior auricular and no occipital adenopathy present.     She has no cervical adenopathy.        Right cervical: No posterior cervical adenopathy present.       Left cervical: No posterior cervical adenopathy present.        Right: No supraclavicular adenopathy present.        Left: No supraclavicular adenopathy present.   Neurological: She is alert and oriented to person, place, and time. She is not disoriented. Coordination and gait normal.   Skin: No abrasion, no ecchymosis, no laceration and no rash noted. No erythema.   Psychiatric: She has a  normal mood and affect. Her behavior is normal.   Nursing note and vitals reviewed.      Assessment:       1. Bronchitis    2. Sinusitis, unspecified chronicity, unspecified location        Plan:         Bronchitis  -     POCT Influenza A/B  -     betamethasone acetate-betamethasone sodium phosphate injection 9 mg; Inject 1.5 mLs (9 mg total) into the muscle once.  -     cefdinir (OMNICEF) 300 MG capsule; Take 1 capsule (300 mg total) by mouth 2 (two) times daily.  Dispense: 14 capsule; Refill: 0  -     fluconazole (DIFLUCAN) 150 MG Tab; Take 1 tablet (150 mg total) by mouth once. AT THE END OF YOUR ANTIBIOTIC COURSE  Dispense: 1 tablet; Refill: 0    Sinusitis, unspecified chronicity, unspecified location  -     betamethasone acetate-betamethasone sodium phosphate injection 9 mg; Inject 1.5 mLs (9 mg total) into the muscle once.  -     cefdinir (OMNICEF) 300 MG capsule; Take 1 capsule (300 mg total) by mouth 2 (two) times daily.  Dispense: 14 capsule; Refill: 0

## 2018-02-09 NOTE — PATIENT INSTRUCTIONS
- Please return here or go to the Emergency Department for any concerns or worsening of condition.   - If you were prescribed antibiotics, please take them to completion.  - Please follow up with your primary care provider (PCP) or discussed specialist(s) as needed.           Sinusitis (Antibiotic Treatment)    The sinuses are air-filled spaces within the bones of the face. They connect to the inside of the nose. Sinusitis is an inflammation of the tissue lining the sinus cavity. Sinus inflammation can occur during a cold. It can also be due to allergies to pollens and other particles in the air. Sinusitis can cause symptoms of sinus congestion and fullness. A sinus infection causes fever, headache and facial pain. There is often green or yellow drainage from the nose or into the back of the throat (post-nasal drip). You have been given antibiotics to treat this condition.  Home care:  · Take the full course of antibiotics as instructed. Do not stop taking them, even if you feel better.  · Drink plenty of water, hot tea, and other liquids. This may help thin mucus. It also may promote sinus drainage.  · Heat may help soothe painful areas of the face. Use a towel soaked in hot water. Or,  the shower and direct the hot spray onto your face. Using a vaporizer along with a menthol rub at night may also help.   · An expectorant containing guaifenesin may help thin the mucus and promote drainage from the sinuses.  · Over-the-counter decongestants may be used unless a similar medicine was prescribed. Nasal sprays work the fastest. Use one that contains phenylephrine or oxymetazoline. First blow the nose gently. Then use the spray. Do not use these medicines more often than directed on the label or symptoms may get worse. You may also use tablets containing pseudoephedrine. Avoid products that combine ingredients, because side effects may be increased. Read labels. You can also ask the pharmacist for help.  (NOTE: Persons with high blood pressure should not use decongestants. They can raise blood pressure.)  · Over-the-counter antihistamines may help if allergies contributed to your sinusitis.    · Do not use nasal rinses or irrigation during an acute sinus infection, unless told to by your health care provider. Rinsing may spread the infection to other sinuses.  · Use acetaminophen or ibuprofen to control pain, unless another pain medicine was prescribed. (If you have chronic liver or kidney disease or ever had a stomach ulcer, talk with your doctor before using these medicines. Aspirin should never be used in anyone under 18 years of age who is ill with a fever. It may cause severe liver damage.)  · Don't smoke. This can worsen symptoms.  Follow-up care  Follow up with your healthcare provider or our staff if you are not improving within the next week.  When to seek medical advice  Call your healthcare provider if any of these occur:  · Facial pain or headache becoming more severe  · Stiff neck  · Unusual drowsiness or confusion  · Swelling of the forehead or eyelids  · Vision problems, including blurred or double vision  · Fever of 100.4ºF (38ºC) or higher, or as directed by your healthcare provider  · Seizure  · Breathing problems  · Symptoms not resolving within 10 days  Date Last Reviewed: 4/13/2015  © 7259-4626 [employer]. [address] [place] [address], [place] [address]. All rights reserved. This information is not intended as a substitute for professional medical care. Always follow your healthcare professional's instructions.      Acute Bronchitis  Your healthcare provider has told you that you have acute bronchitis. Bronchitis is infection or inflammation of the bronchial tubes (airways in the lungs). Normally, air moves easily in and out of the airways. Bronchitis narrows the airways, making it harder for air to flow in and out of the lungs. This causes symptoms such as shortness of breath, coughing  up yellow or green mucus, and wheezing. Bronchitis can be acute or chronic. Acute means the condition comes on quickly and goes away in a short time, usually within 3 to 10 days. Chronic means a condition lasts a long time and often comes back.    What causes acute bronchitis?  Acute bronchitis almost always starts as a viral respiratory infection, such as a cold or the flu. Certain factors make it more likely for a cold or flu to turn into bronchitis. These include being very young, being elderly, having a heart or lung problem, or having a weak immune system. Cigarette smoking also makes bronchitis more likely.  When bronchitis develops, the airways become swollen. The airways may also become infected with bacteria. This is known as a secondary infection.  Diagnosing acute bronchitis  Your healthcare provider will examine you and ask about your symptoms and health history. You may also have a sputum culture to test the fluid in your lungs. Chest X-rays may be done to look for infection in the lungs.  Treating acute bronchitis  Bronchitis usually clears up as the cold or flu goes away. You can help feel better faster by doing the following:  · Take medicine as directed. You may be told to take ibuprofen or other over-the-counter medicines. These help relieve inflammation in your bronchial tubes. Your healthcare provider may prescribe an inhaler to help open up the bronchial tubes. Most of the time, acute bronchitis is caused by a viral infection. Antibiotics are usually not prescribed for viral infections.  · Drink plenty of fluids, such as water, juice, or warm soup. Fluids loosen mucus so that you can cough it up. This helps you breathe more easily. Fluids also prevent dehydration.  · Make sure you get plenty of rest.  · Do not smoke. Do not allow anyone else to smoke in your home.  Recovery and follow-up  Follow up with your doctor as you are told. You will likely feel better in a week or two. But a dry cough  can linger beyond that time. Let your doctor know if you still have symptoms (other than a dry cough) after 2 weeks, or if youre prone to getting bronchial infections. Take steps to protect yourself from future infections. These steps include stopping smoking and avoiding tobacco smoke, washing your hands often, and getting a yearly flu shot.  When to call your healthcare provider  Call the healthcare provider if you have any of the following:  · Fever of 100.4°F (38.0°C) or higher, or as advised  · Symptoms that get worse, or new symptoms  · Trouble breathing  · Symptoms that dont start to improve within a week, or within 3 days of taking antibiotics   Date Last Reviewed: 12/1/2016  © 6871-1515 [employer]. [address] [place] [address], [place] [address]. All rights reserved. This information is not intended as a substitute for professional medical care. Always follow your healthcare professional's instructions.

## 2018-02-16 RX ORDER — FLUOXETINE HYDROCHLORIDE 20 MG/1
20 CAPSULE ORAL DAILY
Qty: 30 CAPSULE | Refills: 0 | Status: SHIPPED | OUTPATIENT
Start: 2018-02-16 | End: 2018-03-09 | Stop reason: SDUPTHER

## 2018-02-22 ENCOUNTER — OFFICE VISIT (OUTPATIENT)
Dept: URGENT CARE | Facility: CLINIC | Age: 28
End: 2018-02-22
Payer: COMMERCIAL

## 2018-02-22 VITALS
TEMPERATURE: 99 F | HEART RATE: 97 BPM | OXYGEN SATURATION: 100 % | BODY MASS INDEX: 20.73 KG/M2 | HEIGHT: 63 IN | RESPIRATION RATE: 18 BRPM | SYSTOLIC BLOOD PRESSURE: 102 MMHG | WEIGHT: 117 LBS | DIASTOLIC BLOOD PRESSURE: 69 MMHG

## 2018-02-22 DIAGNOSIS — R19.7 DIARRHEA, UNSPECIFIED TYPE: Primary | ICD-10-CM

## 2018-02-22 PROCEDURE — 99214 OFFICE O/P EST MOD 30 MIN: CPT | Mod: S$GLB,,, | Performed by: PHYSICIAN ASSISTANT

## 2018-02-22 PROCEDURE — 3008F BODY MASS INDEX DOCD: CPT | Mod: S$GLB,,, | Performed by: PHYSICIAN ASSISTANT

## 2018-02-22 RX ORDER — METRONIDAZOLE 500 MG/1
500 TABLET ORAL 3 TIMES DAILY
Qty: 42 TABLET | Refills: 0 | Status: SHIPPED | OUTPATIENT
Start: 2018-02-22 | End: 2018-03-09 | Stop reason: ALTCHOICE

## 2018-02-22 NOTE — LETTER
February 22, 2018      [facility]  [address]  [facility] [address]  Phone: [phone]  Fax: [phone]       Patient: Linda Rich   YOB: 1990  Date of Visit: 02/22/2018    To Whom It May Concern:    Yosi Rich  was at [facility] on 02/22/2018. She may return to work/school on 02/26/2018 with no restrictions. If you have any questions or concerns, or if I can be of further assistance, please do not hesitate to contact me.    Sincerely,        Ramila Kelley PA-C

## 2018-02-22 NOTE — PATIENT INSTRUCTIONS
- Rest.    - Drink plenty of fluids.    - Tylenol or Ibuprofen as directed as needed for fever/pain.    - Follow up with your PCP or specialty clinic as directed in the next 1-2 weeks if not improved or as needed.  You can call [phone] to schedule an appointment with the appropriate provider.    - Go to the ED if your symptoms worsen.    Uncertain Causes of Diarrhea (Adult)    Diarrhea is when stools are loose and watery. This can be caused by:  · Viral infections  · Bacterial infections  · Food poisoning  · Parasites  · Irritable bowel syndrome (IBS)  · Inflammatory bowel diseases such as ulcerative colitis, Crohn's disease, and celiac disease  · Food intolerance, such as to lactose, the sugar found in milk and milk products  · Reaction to medicines like antibiotics, laxatives, cancer drugs, and antacids  Along with diarrhea, you may also have:  · Abdominal pain and cramping  · Nausea and vomiting  · Loss of bowel control  · Fever and chills  · Bloody stools  In some cases, antibiotics may help to treat diarrhea. You may have a stool sample test. This is done to see what is causing your diarrhea, and if antibiotics will help treat it. The results of a stool sample test may take up to 2 days. The healthcare provider may not give you antibiotics until he or she has the stool test results.  Diarrhea can cause dehydration. This is the loss of too much water and other fluids from the body. When this occurs, body fluid must be replaced. This can be done with oral rehydration solutions. Oral rehydration solutions are available at drugstores and grocery stores without a prescription.  Home care  Follow all instructions given by your healthcare provider. Rest at home for the next 24 hours, or until you feel better. Avoid caffeine, tobacco, and alcohol. These can make diarrhea, cramping, and pain worse.  If taking medicines:  · Dont take over-the-counter diarrhea or nausea medicines unless your healthcare provider  tells you to.  · You may use acetaminophen or NSAID medicines like ibuprofen or naproxen to reduce pain and fever. Dont use these if you have chronic liver or kidney disease, or ever had a stomach ulcer or gastrointestinal bleeding. Don't use NSAID medicines if you are already taking one for another condition (like arthritis) or are on daily aspirin therapy (such as for heart disease or after a stroke). Talk with your healthcare provider first.  · If antibiotics were prescribed, be sure you take them until they are finished. Dont stop taking them even when you feel better. Antibiotics must be taken as a full course.  To prevent the spread of illness:  · Remember that washing with soap and water and using alcohol-based  is the best way to prevent the spread of infection.  · Clean the toilet after each use.  · Wash your hands before eating.  · Wash your hands before and after preparing food. Keep in mind that people with diarrhea or vomiting should not prepare food for others.  · Wash your hands after using cutting boards, countertops, and knives that have been in contact with raw foods.  · Wash and then peel fruits and vegetables.  · Keep uncooked meats away from cooked and ready-to-eat foods.  · Use a food thermometer when cooking. Cook poultry to at least 165°F (74°C). Cook ground meat (beef, veal, pork, lamb) to at least 160°F (71°C). Cook fresh beef, veal, lamb, and pork to at least 145°F (63°C).  · Dont eat raw or undercooked eggs (poached or odessa side up), poultry, meat, or unpasteurized milk and juices.  Food and drinks  The main goal while treating vomiting or diarrhea is to prevent dehydration. This is done by taking small amounts of liquids often.  · Keep in mind that liquids are more important than food right now.  · Drink only small amounts of liquids at a time.  · Dont force yourself to eat, especially if you are having cramping, vomiting, or diarrhea. Dont eat large amounts at a time,  even if you are hungry.  · If you eat, avoid fatty, greasy, spicy, or fried foods.  · Dont eat dairy foods or drink milk if you have diarrhea. These can make diarrhea worse.  During the first 24 hours you can try:  · Oral rehydration solutions. Do not use sports drinks. They have too much sugar and not enough electrolytes.  · Soft drinks without caffeine  · Ginger ale  · Water (plain or flavored)  · Decaf tea or coffee  · Clear broth, consommé, or bouillon  · Gelatin, popsicles, or frozen fruit juice bars  The second 24 hours, if you are feeling better, you can add:  · Hot cereal, plain toast, bread, rolls, or crackers  · Plain noodles, rice, mashed potatoes, chicken noodle soup, or rice soup  · Unsweetened canned fruit (no pineapple)  · Bananas  As you recover:  · Limit fat intake to less than 15 grams per day. Dont eat margarine, butter, oils, mayonnaise, sauces, gravies, fried foods, peanut butter, meat, poultry, or fish.  · Limit fiber. Dont eat raw or cooked vegetables, fresh fruits except bananas, or bran cereals.  · Limit caffeine and chocolate.  · Limit dairy.  · Dont use spices or seasonings except salt.  · Go back to your normal diet over time, as you feel better and your symptoms improve.  · If the symptoms come back, go back to a simple diet or clear liquids.  Follow-up care  Follow up with your healthcare provider, or as advised. If a stool sample was taken or cultures were done, call the healthcare provider for the results as instructed.  Call 911  Call 911 if you have any of these symptoms:  · Trouble breathing  · Confusion  · Extreme drowsiness or trouble walking  · Loss of consciousness  · Rapid heart rate  · Chest pain  · Stiff neck  · Seizure  When to seek medical advice  Call your healthcare provider right away if any of these occur:  · Abdominal pain that gets worse  · Constant lower right abdominal pain  · Continued vomiting and inability to keep liquids down  · Diarrhea more than 5 times a  day  · Blood in vomit or stool  · Dark urine or no urine for 8 hours, dry mouth and tongue, tiredness, weakness, or dizziness  · Drowsiness  · New rash  · You dont get better in 2 to 3 days  · Fever of 100.4°F (38°C) or higher that doesnt get lower with medicine  Date Last Reviewed: 1/3/2016  © 0010-0501 [employer]. [address] [place] [address], [place] [address]. All rights reserved. This information is not intended as a substitute for professional medical care. Always follow your healthcare professional's instructions.

## 2018-02-22 NOTE — PROGRESS NOTES
"Subjective:       Patient ID: Linda Rich is a 27 y.o. female.    Vitals:  height is 5' 3" (1.6 m) and weight is 53.1 kg (117 lb). Her oral temperature is 98.7 °F (37.1 °C). Her blood pressure is 102/69 and her pulse is 97. Her respiration is 18 and oxygen saturation is 100%.     Chief Complaint: Diarrhea    This is a 27 y.o. female with Past Medical History:  No date: Anxiety  No date: Depression   who presents today with a chief complaint of diarrhea. Patient was on Cefdinir 300mg for 5 days starting 2/10/18. She continues with diarrhea and abdominal cramping after stopping antibiotic. Taking Imodium.      Diarrhea    This is a new problem. The current episode started in the past 7 days. The problem occurs 2 to 4 times per day. The problem has been gradually improving. The stool consistency is described as watery. The patient states that diarrhea awakens her from sleep. Associated symptoms include abdominal pain (cramping prior to diarrhea). Pertinent negatives include no chills, fever or vomiting. Risk factors include recent antibiotic use. She has tried anti-motility drug for the symptoms. The treatment provided moderate relief.     Review of Systems   Constitution: Negative for chills and fever.   HENT: Negative for congestion.    Cardiovascular: Negative for chest pain.   Respiratory: Negative for shortness of breath.    Skin: Negative for rash.   Musculoskeletal: Negative for back pain.   Gastrointestinal: Positive for abdominal pain (cramping prior to diarrhea) and diarrhea. Negative for constipation, hematochezia, melena, nausea and vomiting.   Genitourinary: Negative for dysuria.   Neurological: Negative for dizziness and light-headedness.   Psychiatric/Behavioral: The patient is not nervous/anxious.        Objective:      Physical Exam   Constitutional: She is oriented to person, place, and time. She appears well-developed and well-nourished.   HENT:   Head: Normocephalic and atraumatic.   Eyes: " Conjunctivae are normal.   Neck: Normal range of motion. Neck supple. No thyromegaly present.   Cardiovascular: Normal rate and regular rhythm.  Exam reveals no gallop and no friction rub.    No murmur heard.  Pulmonary/Chest: Effort normal and breath sounds normal. She has no wheezes. She has no rales.   Abdominal: Soft. Bowel sounds are normal. She exhibits no distension. There is no tenderness.   Musculoskeletal: Normal range of motion.   Lymphadenopathy:     She has no cervical adenopathy.   Neurological: She is alert and oriented to person, place, and time.   Skin: Skin is warm and dry. No rash noted. No erythema.   Psychiatric: She has a normal mood and affect. Her behavior is normal. Judgment and thought content normal.   Nursing note and vitals reviewed.      11:19 AM - Patient unable to provide a stool sample here.  She was given the supplies to collect one at home and told to bring it back here.  She was given a prescription for flagyl to cover for c. Diff.  She was given precautions and instructed to go tot he ED for any worsening symptoms.      Assessment:       1. Diarrhea, unspecified type        Plan:         Diarrhea, unspecified type  -     CLOSTRIDIUM DIFFICILE  -     Stool culture  -     Stool Exam-Ova,Cysts,Parasites  -     metroNIDAZOLE (FLAGYL) 500 MG tablet; Take 1 tablet (500 mg total) by mouth 3 (three) times daily.  Dispense: 42 tablet; Refill: 0      Linda was seen today for diarrhea.    Diagnoses and all orders for this visit:    Diarrhea, unspecified type  -     CLOSTRIDIUM DIFFICILE  -     Stool culture  -     Stool Exam-Ova,Cysts,Parasites  -     metroNIDAZOLE (FLAGYL) 500 MG tablet; Take 1 tablet (500 mg total) by mouth 3 (three) times daily.      Patient Instructions   - Rest.    - Drink plenty of fluids.    - Tylenol or Ibuprofen as directed as needed for fever/pain.    - Follow up with your PCP or specialty clinic as directed in the next 1-2 weeks if not improved or as needed.   You can call [phone] to schedule an appointment with the appropriate provider.    - Go to the ED if your symptoms worsen.    Uncertain Causes of Diarrhea (Adult)    Diarrhea is when stools are loose and watery. This can be caused by:  · Viral infections  · Bacterial infections  · Food poisoning  · Parasites  · Irritable bowel syndrome (IBS)  · Inflammatory bowel diseases such as ulcerative colitis, Crohn's disease, and celiac disease  · Food intolerance, such as to lactose, the sugar found in milk and milk products  · Reaction to medicines like antibiotics, laxatives, cancer drugs, and antacids  Along with diarrhea, you may also have:  · Abdominal pain and cramping  · Nausea and vomiting  · Loss of bowel control  · Fever and chills  · Bloody stools  In some cases, antibiotics may help to treat diarrhea. You may have a stool sample test. This is done to see what is causing your diarrhea, and if antibiotics will help treat it. The results of a stool sample test may take up to 2 days. The healthcare provider may not give you antibiotics until he or she has the stool test results.  Diarrhea can cause dehydration. This is the loss of too much water and other fluids from the body. When this occurs, body fluid must be replaced. This can be done with oral rehydration solutions. Oral rehydration solutions are available at drugstores and grocery stores without a prescription.  Home care  Follow all instructions given by your healthcare provider. Rest at home for the next 24 hours, or until you feel better. Avoid caffeine, tobacco, and alcohol. These can make diarrhea, cramping, and pain worse.  If taking medicines:  · Dont take over-the-counter diarrhea or nausea medicines unless your healthcare provider tells you to.  · You may use acetaminophen or NSAID medicines like ibuprofen or naproxen to reduce pain and fever. Dont use these if you have chronic liver or kidney disease, or ever had a stomach ulcer or  gastrointestinal bleeding. Don't use NSAID medicines if you are already taking one for another condition (like arthritis) or are on daily aspirin therapy (such as for heart disease or after a stroke). Talk with your healthcare provider first.  · If antibiotics were prescribed, be sure you take them until they are finished. Dont stop taking them even when you feel better. Antibiotics must be taken as a full course.  To prevent the spread of illness:  · Remember that washing with soap and water and using alcohol-based  is the best way to prevent the spread of infection.  · Clean the toilet after each use.  · Wash your hands before eating.  · Wash your hands before and after preparing food. Keep in mind that people with diarrhea or vomiting should not prepare food for others.  · Wash your hands after using cutting boards, countertops, and knives that have been in contact with raw foods.  · Wash and then peel fruits and vegetables.  · Keep uncooked meats away from cooked and ready-to-eat foods.  · Use a food thermometer when cooking. Cook poultry to at least 165°F (74°C). Cook ground meat (beef, veal, pork, lamb) to at least 160°F (71°C). Cook fresh beef, veal, lamb, and pork to at least 145°F (63°C).  · Dont eat raw or undercooked eggs (poached or odessa side up), poultry, meat, or unpasteurized milk and juices.  Food and drinks  The main goal while treating vomiting or diarrhea is to prevent dehydration. This is done by taking small amounts of liquids often.  · Keep in mind that liquids are more important than food right now.  · Drink only small amounts of liquids at a time.  · Dont force yourself to eat, especially if you are having cramping, vomiting, or diarrhea. Dont eat large amounts at a time, even if you are hungry.  · If you eat, avoid fatty, greasy, spicy, or fried foods.  · Dont eat dairy foods or drink milk if you have diarrhea. These can make diarrhea worse.  During the first 24 hours you can  try:  · Oral rehydration solutions. Do not use sports drinks. They have too much sugar and not enough electrolytes.  · Soft drinks without caffeine  · Ginger ale  · Water (plain or flavored)  · Decaf tea or coffee  · Clear broth, consommé, or bouillon  · Gelatin, popsicles, or frozen fruit juice bars  The second 24 hours, if you are feeling better, you can add:  · Hot cereal, plain toast, bread, rolls, or crackers  · Plain noodles, rice, mashed potatoes, chicken noodle soup, or rice soup  · Unsweetened canned fruit (no pineapple)  · Bananas  As you recover:  · Limit fat intake to less than 15 grams per day. Dont eat margarine, butter, oils, mayonnaise, sauces, gravies, fried foods, peanut butter, meat, poultry, or fish.  · Limit fiber. Dont eat raw or cooked vegetables, fresh fruits except bananas, or bran cereals.  · Limit caffeine and chocolate.  · Limit dairy.  · Dont use spices or seasonings except salt.  · Go back to your normal diet over time, as you feel better and your symptoms improve.  · If the symptoms come back, go back to a simple diet or clear liquids.  Follow-up care  Follow up with your healthcare provider, or as advised. If a stool sample was taken or cultures were done, call the healthcare provider for the results as instructed.  Call 911  Call 911 if you have any of these symptoms:  · Trouble breathing  · Confusion  · Extreme drowsiness or trouble walking  · Loss of consciousness  · Rapid heart rate  · Chest pain  · Stiff neck  · Seizure  When to seek medical advice  Call your healthcare provider right away if any of these occur:  · Abdominal pain that gets worse  · Constant lower right abdominal pain  · Continued vomiting and inability to keep liquids down  · Diarrhea more than 5 times a day  · Blood in vomit or stool  · Dark urine or no urine for 8 hours, dry mouth and tongue, tiredness, weakness, or dizziness  · Drowsiness  · New rash  · You dont get better in 2 to 3 days  · Fever of  100.4°F (38°C) or higher that doesnt get lower with medicine  Date Last Reviewed: 1/3/2016  © 9223-7274 [employer]. [address] [place] [address], [place] [address]. All rights reserved. This information is not intended as a substitute for professional medical care. Always follow your healthcare professional's instructions.

## 2018-02-26 ENCOUNTER — HOSPITAL ENCOUNTER (EMERGENCY)
Facility: HOSPITAL | Age: 28
Discharge: HOME | End: 2018-02-26
Attending: EMERGENCY MEDICINE
Payer: COMMERCIAL

## 2018-02-26 VITALS
HEART RATE: 76 BPM | WEIGHT: 116 LBS | TEMPERATURE: 99 F | DIASTOLIC BLOOD PRESSURE: 63 MMHG | RESPIRATION RATE: 20 BRPM | OXYGEN SATURATION: 96 % | HEIGHT: 62 IN | SYSTOLIC BLOOD PRESSURE: 112 MMHG | BODY MASS INDEX: 21.35 KG/M2

## 2018-02-26 DIAGNOSIS — E86.0 DEHYDRATION: ICD-10-CM

## 2018-02-26 DIAGNOSIS — R11.0 NAUSEA: ICD-10-CM

## 2018-02-26 DIAGNOSIS — R19.7 DIARRHEA, UNSPECIFIED TYPE: Primary | ICD-10-CM

## 2018-02-26 LAB
ALBUMIN SERPL BCP-MCNC: 3.2 G/DL
ALP SERPL-CCNC: 52 U/L
ALT SERPL W/O P-5'-P-CCNC: 14 U/L
ANION GAP SERPL CALC-SCNC: 8 MMOL/L
AST SERPL-CCNC: 13 U/L
B-HCG UR QL: NEGATIVE
BACTERIA #/AREA URNS AUTO: ABNORMAL /HPF
BASOPHILS # BLD AUTO: 0.02 K/UL
BASOPHILS NFR BLD: 0.3 %
BILIRUB SERPL-MCNC: 0.6 MG/DL
BILIRUB UR QL STRIP: NEGATIVE
BUN SERPL-MCNC: 5 MG/DL (ref 6–30)
BUN SERPL-MCNC: 7 MG/DL
CALCIUM SERPL-MCNC: 8.9 MG/DL
CHLORIDE SERPL-SCNC: 108 MMOL/L (ref 95–110)
CHLORIDE SERPL-SCNC: 110 MMOL/L
CLARITY UR REFRACT.AUTO: CLEAR
CO2 SERPL-SCNC: 21 MMOL/L
COLOR UR AUTO: ABNORMAL
CREAT SERPL-MCNC: 0.7 MG/DL
CREAT SERPL-MCNC: 0.7 MG/DL (ref 0.5–1.4)
CTP QC/QA: YES
DIFFERENTIAL METHOD: ABNORMAL
EOSINOPHIL # BLD AUTO: 0 K/UL
EOSINOPHIL NFR BLD: 0.5 %
ERYTHROCYTE [DISTWIDTH] IN BLOOD BY AUTOMATED COUNT: 13.1 %
EST. GFR  (AFRICAN AMERICAN): >60 ML/MIN/1.73 M^2
EST. GFR  (NON AFRICAN AMERICAN): >60 ML/MIN/1.73 M^2
GLUCOSE SERPL-MCNC: 90 MG/DL
GLUCOSE SERPL-MCNC: 92 MG/DL (ref 70–110)
GLUCOSE UR QL STRIP: NEGATIVE
HCT VFR BLD AUTO: 36.2 %
HCT VFR BLD CALC: 35 %PCV (ref 36–54)
HGB BLD-MCNC: 12.2 G/DL
HGB UR QL STRIP: NEGATIVE
HYALINE CASTS UR QL AUTO: 3 /LPF
IMM GRANULOCYTES # BLD AUTO: 0.02 K/UL
IMM GRANULOCYTES NFR BLD AUTO: 0.3 %
KETONES UR QL STRIP: ABNORMAL
LEUKOCYTE ESTERASE UR QL STRIP: ABNORMAL
LYMPHOCYTES # BLD AUTO: 1.6 K/UL
LYMPHOCYTES NFR BLD: 25.2 %
MCH RBC QN AUTO: 29.7 PG
MCHC RBC AUTO-ENTMCNC: 33.7 G/DL
MCV RBC AUTO: 88 FL
MICROSCOPIC COMMENT: ABNORMAL
MONOCYTES # BLD AUTO: 0.5 K/UL
MONOCYTES NFR BLD: 7.5 %
NEUTROPHILS # BLD AUTO: 4.1 K/UL
NEUTROPHILS NFR BLD: 66.2 %
NITRITE UR QL STRIP: NEGATIVE
NRBC BLD-RTO: 0 /100 WBC
PH UR STRIP: 8 [PH] (ref 5–8)
PLATELET # BLD AUTO: 302 K/UL
PMV BLD AUTO: 10.3 FL
POC IONIZED CALCIUM: 1.08 MMOL/L (ref 1.06–1.42)
POC TCO2 (MEASURED): 21 MMOL/L (ref 23–29)
POTASSIUM BLD-SCNC: 3.5 MMOL/L (ref 3.5–5.1)
POTASSIUM SERPL-SCNC: 3.6 MMOL/L
PROT SERPL-MCNC: 6.8 G/DL
PROT UR QL STRIP: NEGATIVE
RBC # BLD AUTO: 4.11 M/UL
RBC #/AREA URNS AUTO: 1 /HPF (ref 0–4)
SAMPLE: ABNORMAL
SODIUM BLD-SCNC: 140 MMOL/L (ref 136–145)
SODIUM SERPL-SCNC: 139 MMOL/L
SP GR UR STRIP: 1.02 (ref 1–1.03)
SQUAMOUS #/AREA URNS AUTO: 4 /HPF
URN SPEC COLLECT METH UR: ABNORMAL
UROBILINOGEN UR STRIP-ACNC: NEGATIVE EU/DL
WBC # BLD AUTO: 6.26 K/UL
WBC #/AREA URNS AUTO: 1 /HPF (ref 0–5)

## 2018-02-26 PROCEDURE — 85025 COMPLETE CBC W/AUTO DIFF WBC: CPT

## 2018-02-26 PROCEDURE — 96361 HYDRATE IV INFUSION ADD-ON: CPT

## 2018-02-26 PROCEDURE — 63600175 PHARM REV CODE 636 W HCPCS: Performed by: PHYSICIAN ASSISTANT

## 2018-02-26 PROCEDURE — 81001 URINALYSIS AUTO W/SCOPE: CPT

## 2018-02-26 PROCEDURE — 96374 THER/PROPH/DIAG INJ IV PUSH: CPT

## 2018-02-26 PROCEDURE — 80053 COMPREHEN METABOLIC PANEL: CPT

## 2018-02-26 PROCEDURE — 81025 URINE PREGNANCY TEST: CPT | Performed by: PHYSICIAN ASSISTANT

## 2018-02-26 PROCEDURE — 25000003 PHARM REV CODE 250: Performed by: PHYSICIAN ASSISTANT

## 2018-02-26 PROCEDURE — 99284 EMERGENCY DEPT VISIT MOD MDM: CPT | Mod: ,,, | Performed by: PHYSICIAN ASSISTANT

## 2018-02-26 PROCEDURE — 99283 EMERGENCY DEPT VISIT LOW MDM: CPT | Mod: 25

## 2018-02-26 RX ORDER — ONDANSETRON 4 MG/1
4 TABLET, FILM COATED ORAL EVERY 8 HOURS PRN
Qty: 12 TABLET | Refills: 0 | Status: SHIPPED | OUTPATIENT
Start: 2018-02-26 | End: 2018-03-09

## 2018-02-26 RX ORDER — ONDANSETRON 2 MG/ML
4 INJECTION INTRAMUSCULAR; INTRAVENOUS
Status: COMPLETED | OUTPATIENT
Start: 2018-02-26 | End: 2018-02-26

## 2018-02-26 RX ADMIN — ONDANSETRON 4 MG: 2 INJECTION INTRAMUSCULAR; INTRAVENOUS at 07:02

## 2018-02-26 RX ADMIN — SODIUM CHLORIDE 1000 ML: 0.9 INJECTION, SOLUTION INTRAVENOUS at 07:02

## 2018-02-26 NOTE — ED TRIAGE NOTES
Nausea that began 3 days ago- emesis is yellow. Having diarrhea. Taking flagyl and was taking cefdinir for a sinus infection, seen in urgent care 2/9/2018.. Taken off cefdinir bc diarrhea.  Feels weak when standing.

## 2018-02-26 NOTE — ED PROVIDER NOTES
Encounter Date: 2/26/2018       History     Chief Complaint   Patient presents with    Diarrhea     Pt with recent abx use reports diarrhea x2 weeks accompanied by n/v and abdominal cramping. Pt states she initially had watery diarrhea and now stool has become more formed.     Nausea    Emesis     26 y/o female without significant PMH presents to the ER with chief complaint of fatigue, nausea, and diarrhea x 1-1/2 weeks.  Patient reports that she went to urgent care on 2/13 and was prescribed cefdinir for bronchitis.  She developed diarrhea shortly after.  She says her diarrhea was severe and did not have improvement with imodium.  She went back to the urgent care on 2/22 and was prescribed flagyl for empiric treatment of C. Difficile.  She is taking this as prescribed.  She reports 2-3 episodes of loose/soft brown stool yesterday.  She denies melena or bloody stool.  She reports generalized abdominal cramping, which is intermittent.  She has no current abdominal pain. She had an episode of vomiting 3 days ago. She has nausea and decreased appetite, but no vomiting today.  She denies fever. She reports generalized weakness and fatigue. She denies chest pain, SOB, dysuria, vaginal discharge or additional complaints at this time.            Review of patient's allergies indicates:  No Known Allergies  Past Medical History:   Diagnosis Date    Anxiety     Depression      History reviewed. No pertinent surgical history.  History reviewed. No pertinent family history.  Social History   Substance Use Topics    Smoking status: Never Smoker    Smokeless tobacco: Never Used    Alcohol use No     Review of Systems   Constitutional: Positive for fatigue. Negative for chills and fever.   HENT: Negative for sore throat.    Respiratory: Negative for shortness of breath.    Cardiovascular: Negative for chest pain.   Gastrointestinal: Positive for abdominal pain (cramping), diarrhea, nausea and vomiting.   Genitourinary:  Negative for dysuria, flank pain and frequency.   Musculoskeletal: Negative for back pain.   Skin: Negative for rash.   Neurological: Positive for weakness (generalized). Negative for dizziness.   Hematological: Does not bruise/bleed easily.       Physical Exam     Initial Vitals [02/26/18 0640]   BP Pulse Resp Temp SpO2   112/73 81 16 98.6 °F (37 °C) 100 %      MAP       86         Physical Exam    Nursing note and vitals reviewed.  Constitutional: She appears well-developed and well-nourished.   HENT:   Head: Atraumatic.   Mouth/Throat: Oropharynx is clear and moist.   Eyes: Conjunctivae and EOM are normal. Pupils are equal, round, and reactive to light.   Neck: Normal range of motion. Neck supple.   Cardiovascular: Normal rate, regular rhythm and intact distal pulses.   Pulmonary/Chest: Breath sounds normal. No respiratory distress. She has no wheezes. She has no rhonchi. She has no rales.   Abdominal: Soft. Bowel sounds are normal. She exhibits no distension. There is no tenderness. There is no rebound and no guarding.   Neurological: She is alert and oriented to person, place, and time.   Skin: No rash noted.   Psychiatric: She has a normal mood and affect.         ED Course   Procedures  Labs Reviewed   URINALYSIS, REFLEX TO URINE CULTURE - Abnormal; Notable for the following:        Result Value    Ketones, UA 1+ (*)     Leukocytes, UA Trace (*)     All other components within normal limits    Narrative:     Preferred Collection Type->Urine, Clean Catch  1 cup of urine   CBC W/ AUTO DIFFERENTIAL - Abnormal; Notable for the following:     Hematocrit 36.2 (*)     All other components within normal limits   COMPREHENSIVE METABOLIC PANEL - Abnormal; Notable for the following:     CO2 21 (*)     Albumin 3.2 (*)     Alkaline Phosphatase 52 (*)     All other components within normal limits   URINALYSIS MICROSCOPIC - Abnormal; Notable for the following:     Bacteria, UA Few (*)     Hyaline Casts, UA 3 (*)     All  other components within normal limits    Narrative:     Preferred Collection Type->Urine, Clean Catch  1 cup of urine   ISTAT PROCEDURE - Abnormal; Notable for the following:     POC BUN 5 (*)     POC TCO2 (MEASURED) 21 (*)     POC Hematocrit 35 (*)     All other components within normal limits   POCT URINE PREGNANCY - Normal                   APC / Resident Notes:   Patient presents to the ER with chief complaint of nausea, fatigue, diarrhea.  Patient is currently on Flagyl for empiric treatment for C. difficile due to developing diarrhea shortly after beginning Cefdinir antibiotics.  She appears well, vital signs are stable.  Her abdomen is benign on exam and I do not suspect appendicitis, diverticulitis, acute cholecystitis or acute pelvic process.  I've considered dehydration, electrolyte abnormalities, viral gastroenteritis, antibiotic side effects.  I will order labs give IV fluids, Zofran and reassess.  Patient feels significantly improved with IV fluids and zofran.  She is tolerating PO.    Labs are largely WNL.  She has ketones in UA likely secondary to dehydration without evidence of infection.  No evidence of significant electrolyte abnormalities, severe dehydration, or leukocytosis.  This is likely viral gastroenteritis or antibiotic side effect.   Patient's diarrhea is improving since starting Flagyl, but she does note worsening nausea.  I will advise that she complete the antibiotic and I will prescribe Zofran as needed for nausea.  She is comfortable with this plan.  Patient has no abdominal pain or tenderness on repeat exam.  She is stable for discharge and is given ER return precautions.  She is advised to follow-up with her PCP within one week.  I discussed the care of this patient with my supervising MD.                      Clinical Impression:   The primary encounter diagnosis was Diarrhea, unspecified type. Diagnoses of Nausea and Dehydration were also pertinent to this visit.                            CINDY Mckinney  02/26/18 1540

## 2018-02-26 NOTE — ED NOTES
LOC: The patient is awake and alert; oriented x 3 and speaking appropriately.  APPEARANCE: Patient resting comfortably, patient is clean and well groomed  SKIN: warm and dry, normal skin turgor & moist mucus membranes, skin intact, no breakdown noted.  MUSCULOSKELETAL: Patient moving all extremities well, no obvious swelling or deformities noted  RESPIRATORY: Airway is open and patent,; respirations are spontaneous, normal effort and rate  CARDIAC: Patient has a normal rate, no peripheral edema noted, capillary refill < 3 seconds; No complaints of chest pain   ABDOMEN: Soft and non tender to palpation, no distention noted. Bowel sounds present x 4, having diarrhea  - on antibiotics.

## 2018-03-09 ENCOUNTER — OFFICE VISIT (OUTPATIENT)
Dept: PSYCHIATRY | Facility: CLINIC | Age: 28
End: 2018-03-09
Payer: COMMERCIAL

## 2018-03-09 VITALS
WEIGHT: 121.94 LBS | SYSTOLIC BLOOD PRESSURE: 122 MMHG | HEART RATE: 83 BPM | DIASTOLIC BLOOD PRESSURE: 76 MMHG | BODY MASS INDEX: 22.3 KG/M2

## 2018-03-09 DIAGNOSIS — F41.8 ANXIETY WITH DEPRESSION: Primary | ICD-10-CM

## 2018-03-09 DIAGNOSIS — F41.0 PANIC DISORDER WITHOUT AGORAPHOBIA WITH MILD PANIC ATTACKS: ICD-10-CM

## 2018-03-09 PROCEDURE — 99213 OFFICE O/P EST LOW 20 MIN: CPT | Mod: S$GLB,,, | Performed by: PSYCHIATRY & NEUROLOGY

## 2018-03-09 PROCEDURE — 99999 PR PBB SHADOW E&M-EST. PATIENT-LVL II: CPT | Mod: PBBFAC,,, | Performed by: PSYCHIATRY & NEUROLOGY

## 2018-03-09 RX ORDER — FLUOXETINE HYDROCHLORIDE 20 MG/1
20 CAPSULE ORAL DAILY
Qty: 30 CAPSULE | Refills: 2 | Status: SHIPPED | OUTPATIENT
Start: 2018-03-09 | End: 2018-05-17 | Stop reason: SDUPTHER

## 2018-03-09 RX ORDER — BUPROPION HYDROCHLORIDE 150 MG/1
150 TABLET ORAL DAILY
Qty: 30 TABLET | Refills: 2 | Status: SHIPPED | OUTPATIENT
Start: 2018-03-09 | End: 2018-05-17 | Stop reason: SDUPTHER

## 2018-03-09 RX ORDER — NORGESTIMATE AND ETHINYL ESTRADIOL 7DAYSX3 LO
1 KIT ORAL DAILY
Qty: 30 TABLET | Refills: 11
Start: 2018-03-09 | End: 2023-10-14

## 2018-03-09 NOTE — PROGRESS NOTES
"Outpatient Psychiatry follow up Visit (MD/NP)    10/13/2017    Linda Rich, a 27 y.o. female, presenting for follow up. Met with patient.    Reason for Encounter:  Self-referral. Patient complains of generalized anxiety. Pt here for follow-up.     History of Present Illness:      At last visit, pt was increased to Prozac 30 mg qd for increased anxiety and depression and Wellbutrin was commensurately changed from IR formulation to XL and increased from 75 to 150 mg for anorgasmia 2/2 SSRI and fatigue.  Pt did not like the increase in Prozac as it made her tired with return of sexual dysfunction.  Pt feels "good" currently on Prozac 20 mg qd and Wellbutrin  mg qd without sexual SE.  She reports good control of anxiety and depression without panic attacks.  Pt reports good energy.  She also finds the "shaking" technique helpful to relieve heightened anxiety and has modified it with music and dancing.  Uses Vistaril 25 mg PRN for anxiety and insomnia.  Sleep and appetite are stable.  Work is stable.  Denies SI, HI, AVH, paranoia.    Pt has found CBT techniques helpful with managing her negative thoughts.  Will resume psychotherapy in the summer.    Pt recently recovered from bronchitis and diarrhea 2/2 abx administration (Cefdinir).  Pt was subsequently treated empirically for C.Diff with Flagyl.    Pt is a  at [employer].  Pt with h/o NSSIB.  Pt with h/o mild-moderate restrictive eating pattern.      Per chart review:  Medical hx: none, no head injuries  Allergies: NKDA    Psychiatic hx:   No hx of severe depression, k[place]lee, no ADHD, no OCD.   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     Legal: none    Review Of Systems:     Psychiatric Review Of Systems - Is patient experiencing or having changes in:  sleep: no  appetite: no, h/o restrictive ED behaviors  weight: no  energy/anergy: no  interest/pleasure/anhedonia: no  somatic symptoms: no  libido: no  anxiety/panic: no  guilty/hopelessness: no  concentration: no  S.I.B.s/risky behavior: no  Irritability: denies  Racing thoughts: no  Impulsive behaviors: no  Paranoia: no  AVH: no    Current Evaluation:     Constitutional  Vitals:  Most recent vital signs, dated less than 90 days prior to this appointment, were reviewed.      Vitals:    03/09/18 1531   BP: 122/76   Pulse: 83   Weight: 55.3 kg (121 lb 14.6 oz)        General:  unremarkable, age appropriate     Musculoskeletal  Muscle Strength/Tone:  no dyskinesia, no dystonia   Gait & Station:  non-ataxic     Psychiatric  Speech:  no latency; no press   Mood & Affect:  "good"  congruent and appropriate, full and reactive   Thought Process:  normal and logical   Associations:  intact   Thought Content:  normal, no suicidality, no homicidality, delusions, or paranoia   Insight:  intact   Judgement: behavior is adequate to circumstances   Orientation:  grossly intact   Memory: intact for content of interview   Language: grossly intact   Attention Span & Concentration:  able to focus   Fund of Knowledge:  intact and appropriate to age and level of education       Laboratory Data  No lab results in the past 90 days.    Medications  Outpatient Encounter Prescriptions as of 10/13/2017   Medication Sig Dispense Refill    buPROPion (WELLBUTRIN XL) 150 MG TB24 tablet Take 1 tablet (150 mg total) by mouth once daily. 30 tablet 2    fluoxetine (PROZAC) 10 MG capsule Take 1 capsule (10 mg total) by mouth once daily. 30 capsule 2    fluoxetine (PROZAC) 20 MG capsule Take 1 capsule (20 mg total) by mouth once daily. 30 capsule 2    hydrOXYzine pamoate " (VISTARIL) 25 MG Cap Take 1 capsule (25 mg total) by mouth every 6 (six) hours as needed (anxiety or insomnia). 60 capsule 2    [DISCONTINUED] buPROPion (WELLBUTRIN XL) 150 MG TB24 tablet Take 1 tablet (150 mg total) by mouth once daily. 30 tablet 1    [DISCONTINUED] buPROPion (WELLBUTRIN) 75 MG tablet Take 1 tablet (75 mg total) by mouth once daily. 30 tablet 5    [DISCONTINUED] fluoxetine (PROZAC) 10 MG capsule Take 1 capsule (10 mg total) by mouth once daily. 30 capsule 1    [DISCONTINUED] fluoxetine (PROZAC) 20 MG capsule Take 1 capsule (20 mg total) by mouth once daily. 30 capsule 5    [DISCONTINUED] fluoxetine (PROZAC) 20 MG capsule Take 2 capsules (40 mg total) by mouth once daily. 30 capsule 1    [DISCONTINUED] fluoxetine (PROZAC) 20 MG capsule Take 2 capsules (40 mg total) by mouth once daily. 30 capsule 2    [DISCONTINUED] hydrOXYzine pamoate (VISTARIL) 25 MG Cap Take 1 capsule (25 mg total) by mouth every 6 (six) hours as needed (anxiety or insomnia). 60 capsule 1     No facility-administered encounter medications on file as of 10/13/2017.        Assessment - Diagnosis - Goals:     Impression:     Diagnoses:  Encounter Diagnoses   Name Primary?    Anxiety with depression Yes    Panic disorder without agoraphobia with mild panic attacks      Treatment Plan/Recommendations:   - Continue Prozac 20 mg daily for anxiety and depression.  Discussed potential for GI side effects, sexual dysfunction, mood destabilization, headaches.   - Continue Wellbutrin  mg qd for sexual side effects, depression and fatigue.   - Psychotherapy referral for anxiety and depression.  Pt will resume in summer.      Return to Clinic: 8 weeks    Jose Day MD  [facility]  PGY-3  Pager:  [phone]

## 2018-04-13 NOTE — PROGRESS NOTES
STAFF COMMENTS: I have discussed pt with Dr. Day and reviewed the history and exam. I agree and concur with the assessment and plan.  Agree with continuing prozac and switching wellbutrin to XL

## 2018-05-17 ENCOUNTER — OFFICE VISIT (OUTPATIENT)
Dept: PSYCHIATRY | Facility: CLINIC | Age: 28
End: 2018-05-17
Payer: COMMERCIAL

## 2018-05-17 VITALS
DIASTOLIC BLOOD PRESSURE: 73 MMHG | WEIGHT: 122.25 LBS | SYSTOLIC BLOOD PRESSURE: 113 MMHG | BODY MASS INDEX: 22.36 KG/M2 | HEART RATE: 84 BPM

## 2018-05-17 DIAGNOSIS — F41.0 PANIC DISORDER WITHOUT AGORAPHOBIA WITH MILD PANIC ATTACKS: ICD-10-CM

## 2018-05-17 DIAGNOSIS — F41.8 ANXIETY WITH DEPRESSION: Primary | ICD-10-CM

## 2018-05-17 PROCEDURE — 3008F BODY MASS INDEX DOCD: CPT | Mod: CPTII,S$GLB,, | Performed by: PSYCHIATRY & NEUROLOGY

## 2018-05-17 PROCEDURE — 99214 OFFICE O/P EST MOD 30 MIN: CPT | Mod: S$GLB,,, | Performed by: PSYCHIATRY & NEUROLOGY

## 2018-05-17 PROCEDURE — 99999 PR PBB SHADOW E&M-EST. PATIENT-LVL I: CPT | Mod: PBBFAC,,, | Performed by: PSYCHIATRY & NEUROLOGY

## 2018-05-17 RX ORDER — FLUOXETINE HYDROCHLORIDE 20 MG/1
20 CAPSULE ORAL DAILY
Qty: 30 CAPSULE | Refills: 2 | Status: SHIPPED | OUTPATIENT
Start: 2018-05-17 | End: 2018-07-27 | Stop reason: SDUPTHER

## 2018-05-17 RX ORDER — BUPROPION HYDROCHLORIDE 150 MG/1
150 TABLET ORAL DAILY
Qty: 30 TABLET | Refills: 2 | Status: SHIPPED | OUTPATIENT
Start: 2018-05-17 | End: 2018-07-27 | Stop reason: SDUPTHER

## 2018-05-17 NOTE — PROGRESS NOTES
Outpatient Psychiatry follow up Visit (MD/NP)    10/13/2017    Linda Rich, a 27 y.o. female, presenting for follow up. Met with patient.    Reason for Encounter:  Self-referral. Patient complains of generalized anxiety. Pt here for follow-up.     History of Present Illness:      At last visit, pt continued on Prozac 20 mg qd and Wellbutrin  mg qd without SE/AE.  She reports good control of anxiety and depression without panic attacks.  Pt reports good energy. Denies sexual SE.  Uses Vistaril 25 mg PRN for anxiety and insomnia (only 2-3x in past month).  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  End of academic year is stressful but not overwhelming.  Sleep and appetite are stable.Denies SI, HI, AVH, paranoia.    Pt with h/o NSSIB.  Pt with h/o mild-moderate restrictive eating pattern.      Per chart review:  Medical hx: none, no head injuries  Allergies: NKDA    Psychiatic hx:   No hx of severe depression, kimberlee, no ADHD, no OCD.   No psych hx or contacts  (+) NSSIB in high school  No hx active SI  No psych meds  Trauma: none    Social hx:   Lives with , from [place]  BA in English education, no learning problems  Works as  at [email or web address]  No kids, no plans for kids any time soon    Family psych hx: none    Substance: one drink on weekends, no tobacco, in HS tried marijuana but not currently using.     Legal: none    Review Of Systems:     Psychiatric Review Of Systems - Is patient experiencing or having changes in:  sleep: no  appetite: no, h/o restrictive ED behaviors  weight: no  energy/anergy: no  interest/pleasure/anhedonia: no  somatic symptoms: no  libido: no  anxiety/panic: no  guilty/hopelessness: no  concentration: no  S.I.B.s/risky behavior: no  Irritability: denies  Racing thoughts: no  Impulsive behaviors: no  Paranoia: no  AVH: no    Current Evaluation:  "    Constitutional  Vitals:  Most recent vital signs, dated less than 90 days prior to this appointment, were reviewed.      There were no vitals filed for this visit.     General:  unremarkable, age appropriate, fair grooming     Musculoskeletal  Muscle Strength/Tone:  no dyskinesia, no dystonia   Gait & Station:  non-ataxic     Psychiatric  Speech:  no latency; no press   Mood & Affect:  "good"  congruent and appropriate, full and reactive   Thought Process:  normal and logical   Associations:  intact   Thought Content:  normal, no suicidality, no homicidality, delusions, or paranoia   Insight:  intact   Judgement: behavior is adequate to circumstances   Orientation:  grossly intact   Memory: intact for content of interview   Language: grossly intact   Attention Span & Concentration:  able to focus   Fund of Knowledge:  intact and appropriate to age and level of education       Laboratory Data  No lab results in the past 90 days.    Medications  Outpatient Encounter Prescriptions as of 10/13/2017   Medication Sig Dispense Refill    buPROPion (WELLBUTRIN XL) 150 MG TB24 tablet Take 1 tablet (150 mg total) by mouth once daily. 30 tablet 2    fluoxetine (PROZAC) 10 MG capsule Take 1 capsule (10 mg total) by mouth once daily. 30 capsule 2    fluoxetine (PROZAC) 20 MG capsule Take 1 capsule (20 mg total) by mouth once daily. 30 capsule 2    hydrOXYzine pamoate (VISTARIL) 25 MG Cap Take 1 capsule (25 mg total) by mouth every 6 (six) hours as needed (anxiety or insomnia). 60 capsule 2    [DISCONTINUED] buPROPion (WELLBUTRIN XL) 150 MG TB24 tablet Take 1 tablet (150 mg total) by mouth once daily. 30 tablet 1    [DISCONTINUED] buPROPion (WELLBUTRIN) 75 MG tablet Take 1 tablet (75 mg total) by mouth once daily. 30 tablet 5    [DISCONTINUED] fluoxetine (PROZAC) 10 MG capsule Take 1 capsule (10 mg total) by mouth once daily. 30 capsule 1    [DISCONTINUED] fluoxetine (PROZAC) 20 MG capsule Take 1 capsule (20 mg total) " by mouth once daily. 30 capsule 5    [DISCONTINUED] fluoxetine (PROZAC) 20 MG capsule Take 2 capsules (40 mg total) by mouth once daily. 30 capsule 1    [DISCONTINUED] fluoxetine (PROZAC) 20 MG capsule Take 2 capsules (40 mg total) by mouth once daily. 30 capsule 2    [DISCONTINUED] hydrOXYzine pamoate (VISTARIL) 25 MG Cap Take 1 capsule (25 mg total) by mouth every 6 (six) hours as needed (anxiety or insomnia). 60 capsule 1     No facility-administered encounter medications on file as of 10/13/2017.        Assessment - Diagnosis - Goals:     Diagnoses:  Encounter Diagnoses   Name Primary?    Anxiety with depression Yes    Panic disorder without agoraphobia with mild panic attacks      Treatment Plan/Recommendations:   - Continue Prozac 20 mg daily for anxiety and depression.  Discussed potential for GI side effects, sexual dysfunction, mood destabilization, headaches.   - Continue Wellbutrin  mg qd for sexual side effects, depression and fatigue.   - Psychotherapy referral for anxiety and depression.  Pt will resume in summer.    - Discussed resident transition on July 1, 2018.    Return to Clinic: 3 months    Jose Day MD  [facility]  PGY-3  Pager:  [phone]

## 2018-05-18 NOTE — PROGRESS NOTES
STAFF COMMENTS: I have discussed pt with Dr. Day and reviewed the history and exam. I agree and concur with the assessment and plan.

## 2018-07-02 ENCOUNTER — NURSE TRIAGE (OUTPATIENT)
Dept: ADMINISTRATIVE | Facility: CLINIC | Age: 28
End: 2018-07-02

## 2018-07-02 NOTE — TELEPHONE ENCOUNTER
Reason for Disposition   Requesting regular office appointment    Protocols used: ST INFORMATION ONLY CALL-A-AH    Pt calling because she does not know where to turn to get Wellbutrin and Prozac filled because she was seeing a resident who no longer works there. Advised pt to talk to scheduling and see if there is someone else she can see at that location or at another location.

## 2018-07-05 ENCOUNTER — OFFICE VISIT (OUTPATIENT)
Dept: URGENT CARE | Facility: CLINIC | Age: 28
End: 2018-07-05
Payer: COMMERCIAL

## 2018-07-05 VITALS
HEIGHT: 62 IN | TEMPERATURE: 97 F | RESPIRATION RATE: 20 BRPM | WEIGHT: 120 LBS | HEART RATE: 83 BPM | SYSTOLIC BLOOD PRESSURE: 121 MMHG | DIASTOLIC BLOOD PRESSURE: 80 MMHG | BODY MASS INDEX: 22.08 KG/M2 | OXYGEN SATURATION: 96 %

## 2018-07-05 DIAGNOSIS — R11.10 VOMITING, INTRACTABILITY OF VOMITING NOT SPECIFIED, PRESENCE OF NAUSEA NOT SPECIFIED, UNSPECIFIED VOMITING TYPE: Primary | ICD-10-CM

## 2018-07-05 DIAGNOSIS — F10.920 ALCOHOLIC INTOXICATION WITHOUT COMPLICATION: ICD-10-CM

## 2018-07-05 LAB
B-HCG UR QL: NEGATIVE
BILIRUB UR QL STRIP: NEGATIVE
CTP QC/QA: YES
GLUCOSE SERPL-MCNC: 103 MG/DL (ref 70–110)
GLUCOSE UR QL STRIP: NEGATIVE
KETONES UR QL STRIP: POSITIVE
LEUKOCYTE ESTERASE UR QL STRIP: NEGATIVE
PH, POC UA: 8.5
POC ANION GAP: 21 MMOL/L
POC BLOOD, URINE: NEGATIVE
POC BUN: 13 MMOL/L
POC CHLORIDE: 106 MMOL/L
POC CREATININE: 0.74 MG/DL (ref 0.6–1.3)
POC HEMATOCRIT: 38 %PCV (ref 37–47)
POC HEMOGLOBIN: 12.9 G/DL (ref 12.5–16)
POC ICA: 1.15 MMOL/L
POC NITRATES, URINE: NEGATIVE
POC POTASSIUM: 3.9 MMOL/L
POC SODIUM: 142 MMOL/L
POC TCO2: 19 MMOL/L
PROT UR QL STRIP: NEGATIVE
SP GR UR STRIP: 1 (ref 1–1.03)
UROBILINOGEN UR STRIP-ACNC: NORMAL (ref 0.1–1.1)

## 2018-07-05 PROCEDURE — 81025 URINE PREGNANCY TEST: CPT | Mod: S$GLB,,, | Performed by: FAMILY MEDICINE

## 2018-07-05 PROCEDURE — 80047 BASIC METABLC PNL IONIZED CA: CPT | Mod: QW,S$GLB,, | Performed by: FAMILY MEDICINE

## 2018-07-05 PROCEDURE — 81003 URINALYSIS AUTO W/O SCOPE: CPT | Mod: QW,S$GLB,, | Performed by: FAMILY MEDICINE

## 2018-07-05 PROCEDURE — S0119 ONDANSETRON 4 MG: HCPCS | Mod: S$GLB,,, | Performed by: FAMILY MEDICINE

## 2018-07-05 PROCEDURE — 99214 OFFICE O/P EST MOD 30 MIN: CPT | Mod: 25,S$GLB,, | Performed by: FAMILY MEDICINE

## 2018-07-05 PROCEDURE — 3008F BODY MASS INDEX DOCD: CPT | Mod: CPTII,S$GLB,, | Performed by: FAMILY MEDICINE

## 2018-07-05 RX ORDER — SODIUM CHLORIDE 9 MG/ML
INJECTION, SOLUTION INTRAVENOUS
Status: DISCONTINUED | OUTPATIENT
Start: 2018-07-05 | End: 2018-07-05

## 2018-07-05 RX ORDER — ONDANSETRON 4 MG/1
4 TABLET, FILM COATED ORAL EVERY 6 HOURS PRN
Qty: 12 TABLET | Refills: 0 | Status: SHIPPED | OUTPATIENT
Start: 2018-07-05 | End: 2019-07-05

## 2018-07-05 RX ORDER — SODIUM CHLORIDE 9 MG/ML
INJECTION, SOLUTION INTRAVENOUS
Status: COMPLETED | OUTPATIENT
Start: 2018-07-05 | End: 2018-07-05

## 2018-07-05 RX ORDER — ONDANSETRON 4 MG/1
4 TABLET, ORALLY DISINTEGRATING ORAL
Status: COMPLETED | OUTPATIENT
Start: 2018-07-05 | End: 2018-07-05

## 2018-07-05 RX ADMIN — SODIUM CHLORIDE 500 ML/HR: 9 INJECTION, SOLUTION INTRAVENOUS at 11:07

## 2018-07-05 RX ADMIN — ONDANSETRON 4 MG: 4 TABLET, ORALLY DISINTEGRATING ORAL at 12:07

## 2018-07-05 NOTE — PATIENT INSTRUCTIONS
"  Alcohol Intoxication  Alcohol intoxication occurs when you drink alcohol faster than your liver can remove it from your system. The following facts are important to remember:  · It can take 10 minutes or more to start to feel the effects of a drink, so you can easily get more intoxicated than you intended.  · One drink may be more than 1 serving of alcohol. Depending on the drink, it can be 2 to 4 servings.  · It takes about an hour for your body to metabolize (clear) 1 serving. If you have more than 1 drink, it can take a couple of hours or more.  · Many things affect how drinks will affect you, including whether youve eaten, how fast you drink, your size, how much you normally drink (or not), medicines you take, chronic diseases you have, and gender.  Signs and symptoms of alcohol poisoning  The following are signs and symptoms of alcohol poisoning:  Mild impairment  · Reduced inhibitions  · Slurred speech  · Drowsiness  · Decreased fine motor skills  Moderate impairment  · Erratic behavior, aggression, depression  · Impaired judgment  · Confusion  · Concentration difficulties  · Coordination problems  Severe impairment  · Vomiting  · Seizures  · Unconsciousness  · Cold, clammy  · Slow or irregular breathing  · Hypothermia (low body temperature)  · Coma  Health effects  Alcohol abuse causes health problems. Sometimes this can happen after only drinking a little." There is no set number of drinks or amount of alcohol that defines too much. The more you drink at one time, and the more frequently you drink determine both the short-term and long-term health effects. It affects all parts of your body and your health, including your:  · Brain. Alcohol is a central nervous system depressant. It can damage parts of the brain that affect your balance, memory, thinking, and emotions. It can cause memory loss, blackouts, depression, agitation, sleep cycle changes, and seizures. These changes may or may not be " reversible.  · Heart and vascular system. Alcohol affects multiple areas. It can damage heart muscle causing cardiomyopathy, which is a weakening and stretching of the heart muscle. This can lead to trouble breathing, an irregular heartbeat, atrial fibrillation, leg swelling, and heart failure. It makes the blood vessels stiffen causing hypertension (high blood pressure). All of these problems increase your risk of having heart attacks or strokes.  · Liver. Alcohol causes fat to build up in the liver, affecting its normal function. This increases the risk for hepatitis, leading to abdominal pain, appetite loss, jaundice, bleeding problems, liver fibrosis, and cirrhosis. This in turn can affect your ability to fight off infections, and can cause diabetes. The liver changes prevent it from removing toxins in your blood that can cause encephalopathy. Signs of this are confusion, altered level of consciousness, personality changes, memory loss, seizures, coma, and death.  · Pancreas. Alcohol can cause inflammation of the pancreas, or pancreatitis. This can cause pain in your abdomen, fever, and diabetes.  · Immune system. Alcohol weakens your immune system in a number of ways. It suppresses your immune system making it harder to fight off infections and colds. You will also have a higher risk of certain infections like pneumonia and tuberculosis.  · Cancer risk. Alcohol raises your risk of cancer of the mouth, esophagus, pharynx, larynx, liver, and breast.  · Sexual function. Alcohol abuse can also lead to sexual problems.  Alcohol use during pregnancy may cause permanent damage to the growing baby.  Home care  The following guidelines will help you care for yourself at home:  · Don't drink any more alcohol.  · Don't drive until all effects of the alcohol have worn off.  · Don't operate machinery that can cause injuries.  · Get lots of rest over the next few days. Drink plenty of water and other non-alcoholic liquids.  Try to eat regular meals.  · If you have been drinking heavily on a daily basis, you may go through alcohol withdrawal. The usual symptoms last 3 to 4 days and may include nervousness, shakiness, nausea, sweating, sleeplessness, and can even cause seizures and a serious withdrawal symptom called delirium tremens, or DTs. During this time, it is best that you stay with family or friends who can help and support you. You can also admit yourself to a residential detox program. If your symptoms are severe (seizures, severe shakiness, confusion), contact your doctor or call an ambulance for help (see below).   Follow-up care  If alcohol is a problem in your life, these are some organizations that can help you:  · Alcoholics Anonymous offers support through a self-help fellowship. There are no dues or fees. See the Yellow Pages and call for time and place of meetings. Find AA online at www.aa.org.  · [facility] offers support to families of alcohol users. Contact 339-517-9594, or online at www.al-anon.org.  · National [employer] on Alcoholism and Drug Dependence can be reached at 788-218-7931, or online at www.ncadd.org.  · There are also inpatient and residential alcohol detox programs. Check the Internet or phonebook Yellow Pages under Drug Abuse and Treatment Centers.  Call 911  Call 911 if any of these occur:  · Trouble breathing or slow irregular breathing  · Chest pain  · Sudden weakness on one side of your body or sudden trouble speaking  · Heavy bleeding or vomiting blood  · Very drowsy or trouble awakening  · Fainting or loss of consciousness  · Rapid heart rate  · Seizure  When to seek medical advice  Call your healthcare provider right away if any of these occur:  · Severe shakiness   · Fever over 100.4º F (38.0º C)  · Confusion or hallucinations (seeing, hearing, or feeling things that are not there)  · Pain in your upper abdomen that gets worse  · Repeated vomiting  Date Last Reviewed: 6/1/2016  © 8647-1195 The  "[employer]. [address] [place] [address], [place] [address]. All rights reserved. This information is not intended as a substitute for professional medical care. Always follow your healthcare professional's instructions.        Vomiting (Adult)  Vomiting is a common symptom that may be due to different causes. These include gastroenteritis ("stomach flu"), food poisoning and gastritis. There are other more serious causes of vomiting which may be hard to diagnose early in the illness. Therefore, it is important to watch for the warning signs listed below.  The main danger from repeated vomiting is dehydration. This is due to excess loss of water and minerals from the body. When this occurs, body fluids must be replaced.  Home care  · If symptoms are severe, rest at home for the next 24 hours.  · Because your symptoms may be from an infection, wash your hands frequently and well, and use alcohol-based  to avoid spreading the infection to others.  · Wash your hands for at least 20 seconds. Hum the happy birthday song twice for the correct length of time.  · Wash your hands after using the toilet, before and after preparing food, before eating food, after changing a diaper, cleaning a wound, caring for a sick person, and blowing your nose, coughing, or sneezing. You should also wash your hands after caring for someone who is sick, touching pet food, or treats, and touching an animal, or animal waste.  · You may use acetaminophen or NSAID medicines like ibuprofen or naproxen to control fever, unless another medicine was prescribed. If you have chronic liver or kidney disease or ever had a stomach ulcer or GI bleeding, talk with your doctor before using these medicines. Aspirin should never be used in anyone under 18 years of age who is ill with a fever. It may cause severe liver damage. Don't use NSAID medicines if you are already taking one for another condition (like arthritis) or are on aspirin (such as " for heart disease, or after a stroke)  · Avoid tobacco and alcohol use, which may worsen your symptoms.  · If medicines for vomiting were prescribed, take as directed.  · Once vomiting stops, then follow these guidelines:  During the first 12 to 24 hours follow the diet below:  · Fruit juices. Apple, grape juice, clear fruit drinks, and electrolyte replacement drinks.  · Beverages. Soft drinks without caffeine; mineral water (plain or flavored), decaffeinated tea and coffee.  · Soups. Clear broth, consommé and bouillon  · Desserts. Plain gelatin, popsicles and fruit juice bars. As you feel better, you may add 6-8 ounces of yogurt per day.  During the next 24 hours you may add the following to the above:  · Hot cereal, plain toast, bread, rolls, crackers  · Plain noodles, rice, mashed potatoes, chicken noodle or rice soup  · Unsweetened canned fruit (avoid pineapple), bananas  · Limit caffeine and chocolate. No spices or seasonings except salt.  During the next 24 hours:  Gradually resume a normal diet, as you feel better and your symptoms lessen.  Follow-up care  Follow up with your healthcare provider, or as advised.  When to seek medical advice  Call your healthcare provider right away if any of these occur:  · Constant right-sided lower abdominal pain or increasing general abdominal pain  · Continued vomiting (unable to keep liquids down) for 24 hours  · Frequent diarrhea (more than 5 times a day); blood (red or black color) or mucus in diarrhea  · Reduced urine output or extreme thirst  · Weakness, dizziness or fainting  · Unusually drowsy or confused  · Fever of 100.4°F (38°C) oral or higher, or as directed  · Yellow color of the eyes or skin  Date Last Reviewed: 11/16/2015  © 0603-3806 [employer]. [address] [place] [address], [place] [address]. All rights reserved. This information is not intended as a substitute for professional medical care. Always follow your healthcare professional's  instructions.

## 2018-07-05 NOTE — PROGRESS NOTES
"Subjective:       Patient ID: Linda Rich is a 28 y.o. female.    Vitals:  height is 5' 2" (1.575 m) and weight is 54.4 kg (120 lb). Her tympanic temperature is 96.8 °F (36 °C). Her blood pressure is 121/80 and her pulse is 83. Her respiration is 20 and oxygen saturation is 96%.     Chief Complaint: Vomiting    This is a 28 y.o. female with who presents today with a chief complaint of vomiting since this morning.  Patient states she had too much to drink - she states she had a bottle of wine.  Patient denies hematemesis but reports dry heaves. crampy abdominal pain. No change in bowel movement      Emesis    This is a new problem. The current episode started today. The problem occurs 5 to 10 times per day. The problem has been unchanged. The emesis has an appearance of stomach contents. There has been no fever. Pertinent negatives include no abdominal pain, chest pain, chills, diarrhea, fever or headaches. She has tried nothing for the symptoms.     Review of Systems   Constitution: Negative for chills and fever.   HENT: Negative for sore throat.    Eyes: Negative for blurred vision.   Cardiovascular: Negative for chest pain.   Respiratory: Negative for shortness of breath.    Skin: Negative for rash.   Musculoskeletal: Negative for back pain and joint pain.   Gastrointestinal: Positive for nausea and vomiting. Negative for abdominal pain and diarrhea.   Neurological: Negative for headaches.   Psychiatric/Behavioral: The patient is not nervous/anxious.        Objective:      Physical Exam   Constitutional: Vital signs are normal. She appears well-developed and well-nourished. She appears ill.   HENT:   Head: Normocephalic and atraumatic.   Mouth/Throat: Mucous membranes are pale and dry.   Eyes: EOM are normal. Pupils are equal, round, and reactive to light.   Neck: Normal range of motion. Neck supple.   Cardiovascular: Normal rate and regular rhythm.    Pulmonary/Chest: Effort normal and breath sounds " normal.   Abdominal: Soft. She exhibits no distension and no mass. There is tenderness (mild diffuse). There is no rebound and no guarding.   Skin: Skin is dry.   Nursing note and vitals reviewed.      Results for orders placed or performed in visit on 07/05/18   POCT Chemistry Panel   Result Value Ref Range    POC Sodium 142 MMOL/L    POC Potassium 3.9 MMOL/L    POC Chloride 106 MMOL/L    POC BUN 13 MMOL/L    POC Glucose 103 70 - 110 MG/DL    POC Creatinine 0.74 0.6 - 1.3 mg/dL    POC iCA 1.15 MMOL/L    POC TCO2 19 MMOL/L    POC Hematocrit 38 37 - 47 %PCV    POC Hemoglobin 12.9 12.5 - 16 g/dL    POC Anion Gap 21 MMOL/L   POCT Urinalysis, Dipstick, Automated, W/O Scope   Result Value Ref Range    POC Blood, Urine Negative Negative    POC Bilirubin, Urine Negative Negative    POC Urobilinogen, Urine Normal 0.1 - 1.1    POC Ketones, Urine Positive (A) Negative    POC Protein, Urine Negative Negative    POC Nitrates, Urine Negative Negative    POC Glucose, Urine Negative Negative    pH, UA 8.5     POC Specific Gravity, Urine 1.005 1.003 - 1.029    POC Leukocytes, Urine Negative Negative   POCT urine pregnancy   Result Value Ref Range    POC Preg Test, Ur Negative Negative     Acceptable Yes      Assessment:       1. Vomiting, intractability of vomiting not specified, presence of nausea not specified, unspecified vomiting type    2. Alcoholic intoxication without complication        Plan:         Vomiting, intractability of vomiting not specified, presence of nausea not specified, unspecified vomiting type  -     POCT Chemistry Panel  -     Discontinue: 0.9%  NaCl infusion; Inject into the vein one time.  -     POCT Urinalysis, Dipstick, Automated, W/O Scope  -     POCT urine pregnancy  -     0.9%  NaCl infusion; Inject into the vein one time.  -     ondansetron disintegrating tablet 4 mg; Take 1 tablet (4 mg total) by mouth one time.  -     ondansetron (ZOFRAN) 4 MG tablet; Take 1 tablet (4 mg total) by  mouth every 6 (six) hours as needed for Nausea.  Dispense: 12 tablet; Refill: 0    Alcoholic intoxication without complication  -     ondansetron disintegrating tablet 4 mg; Take 1 tablet (4 mg total) by mouth one time.  -     ondansetron (ZOFRAN) 4 MG tablet; Take 1 tablet (4 mg total) by mouth every 6 (six) hours as needed for Nausea.  Dispense: 12 tablet; Refill: 0      Stressed re-hydration

## 2018-07-08 ENCOUNTER — TELEPHONE (OUTPATIENT)
Dept: URGENT CARE | Facility: CLINIC | Age: 28
End: 2018-07-08

## 2018-07-27 ENCOUNTER — OFFICE VISIT (OUTPATIENT)
Dept: PSYCHIATRY | Facility: CLINIC | Age: 28
End: 2018-07-27
Payer: COMMERCIAL

## 2018-07-27 VITALS
BODY MASS INDEX: 22.5 KG/M2 | WEIGHT: 122.25 LBS | SYSTOLIC BLOOD PRESSURE: 121 MMHG | HEART RATE: 77 BPM | DIASTOLIC BLOOD PRESSURE: 77 MMHG | HEIGHT: 62 IN

## 2018-07-27 DIAGNOSIS — F41.8 ANXIETY WITH DEPRESSION: Primary | ICD-10-CM

## 2018-07-27 PROCEDURE — 3008F BODY MASS INDEX DOCD: CPT | Mod: CPTII,S$GLB,, | Performed by: PSYCHIATRY & NEUROLOGY

## 2018-07-27 PROCEDURE — 99213 OFFICE O/P EST LOW 20 MIN: CPT | Mod: S$GLB,,, | Performed by: PSYCHIATRY & NEUROLOGY

## 2018-07-27 PROCEDURE — 99999 PR PBB SHADOW E&M-EST. PATIENT-LVL III: CPT | Mod: PBBFAC,,, | Performed by: PSYCHIATRY & NEUROLOGY

## 2018-07-27 RX ORDER — BUPROPION HYDROCHLORIDE 150 MG/1
150 TABLET ORAL DAILY
Qty: 30 TABLET | Refills: 5 | Status: SHIPPED | OUTPATIENT
Start: 2018-07-27 | End: 2018-12-21

## 2018-07-27 RX ORDER — FLUOXETINE HYDROCHLORIDE 20 MG/1
20 CAPSULE ORAL DAILY
Qty: 30 CAPSULE | Refills: 5 | Status: SHIPPED | OUTPATIENT
Start: 2018-07-27 | End: 2018-07-27 | Stop reason: SDUPTHER

## 2018-07-27 RX ORDER — FLUOXETINE HYDROCHLORIDE 20 MG/1
20 CAPSULE ORAL DAILY
Qty: 30 CAPSULE | Refills: 5 | Status: SHIPPED | OUTPATIENT
Start: 2018-07-27 | End: 2018-12-21

## 2018-07-27 NOTE — PROGRESS NOTES
Outpatient Psychiatry Initial Visit (MD/NP)    7/27/2018    Linda Rich, a 28 y.o. female, presenting for initial evaluation visit. Met with patient.    Reason for Encounter: Referral from Dr. Day. Patient complains of No chief complaint on file.  .    History of Present Illness:  Patient reports that she first started seeing psychiatry 2 years ago for anxiety. She feels like she has been dealing with anxiety since she was in high school. She was initially started on Prozac 20 mg daily which was effective but it made her slightly tired and losing her sex drive. She was started on Wellbutrin 150 mg daily to help with this and it has been effective. She is also taking Vistaril a needed for panic attacks. She reports that she has not taken any Vistaril in the past 2 months.     Patient denies any history of substance abuse.    Patient reports that since she last saw Dr. Day she has been doing well. She rates her anxiety 3/10 with 10 being the worst and 1 being no anxiety and rates her depression 2/10. She has been eating and sleeping well. She has been medication compliant and denies any side effects. She reports her sex drive is doing well with her current medication regimen. 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 At this time she is requesting extra refills so that she will not need to follow up until December when she has her next break.    Patient denies any SI/HI/AVH. Denies any symptoms of kimberlee, depression, or psychosis.    Review Of Systems:     GENERAL:  No weight gain or loss  SKIN:  No rashes or lacerations  HEAD:  No headaches  EYES:  No exophthalmos, jaundice or blindness  EARS:  No dizziness, tinnitus or hearing loss  NOSE:  No changes in smell  MOUTH & THROAT:  No dyskinetic movements or obvious goiter  CHEST:  No shortness of breath, hyperventilation or cough  CARDIOVASCULAR:  No tachycardia or chest pain  ABDOMEN:  No nausea, vomiting, pain, constipation or  diarrhea  URINARY:  No frequency, dysuria or sexual dysfunction  ENDOCRINE:  No polydipsia, polyuria  MUSCULOSKELETAL:  No pain or stiffness of the joints  NEUROLOGIC:  No weakness, sensory changes, seizures, confusion, memory loss, tremor or other abnormal movements    Current Evaluation:     Nutritional Screening: Considering the patient's height and weight, medications, medical history and preferences, should a referral be made to the dietitian? no    Constitutional  Vitals:  Most recent vital signs, dated less than 90 days prior to this appointment, were reviewed.    There were no vitals filed for this visit.     General:  unremarkable, age appropriate     Musculoskeletal  Muscle Strength/Tone:  no spasicity, no rigidity   Gait & Station:  non-ataxic     Psychiatric  Speech:  no latency; no press   Mood & Affect:  euthymic  congruent and appropriate   Thought Process:  normal and logical   Associations:  intact   Thought Content:  normal, no suicidality, no homicidality, delusions, or paranoia   Insight:  intact   Judgement: behavior is adequate to circumstances   Orientation:  grossly intact   Memory: intact for content of interview   Language: grossly intact   Attention Span & Concentration:  able to focus   Fund of Knowledge:  intact and appropriate to age and level of education       Relevant Elements of Neurological Exam: normal gait    Functioning in Relationships:  Spouse/partner: Good relationship with   Peers: Good social network  Employers: Happy with current employer    Laboratory Data  Office Visit on 07/05/2018   Component Date Value Ref Range Status    POC Sodium 07/05/2018 142  MMOL/L Final    POC Potassium 07/05/2018 3.9  MMOL/L Final    POC Chloride 07/05/2018 106  MMOL/L Final    POC BUN 07/05/2018 13  MMOL/L Final    POC Glucose 07/05/2018 103  70 - 110 MG/DL Final    POC Creatinine 07/05/2018 0.74  0.6 - 1.3 mg/dL Final    POC iCA 07/05/2018 1.15  MMOL/L Final    POC TCO2  07/05/2018 19  MMOL/L Final    POC Hematocrit 07/05/2018 38  37 - 47 %PCV Final    POC Hemoglobin 07/05/2018 12.9  12.5 - 16 g/dL Final    POC Anion Gap 07/05/2018 21  MMOL/L Final    POC Blood, Urine 07/05/2018 Negative  Negative Final    POC Bilirubin, Urine 07/05/2018 Negative  Negative Final    POC Urobilinogen, Urine 07/05/2018 Normal  0.1 - 1.1 Final    POC Ketones, Urine 07/05/2018 Positive* Negative Final    POC Protein, Urine 07/05/2018 Negative  Negative Final    POC Nitrates, Urine 07/05/2018 Negative  Negative Final    POC Glucose, Urine 07/05/2018 Negative  Negative Final    pH, UA 07/05/2018 8.5   Final    POC Specific Gravity, Urine 07/05/2018 1.005  1.003 - 1.029 Final    POC Leukocytes, Urine 07/05/2018 Negative  Negative Final    POC Preg Test, Ur 07/05/2018 Negative  Negative Final     Acceptable 07/05/2018 Yes   Final         Medications  Outpatient Encounter Prescriptions as of 7/27/2018   Medication Sig Dispense Refill    buPROPion (WELLBUTRIN XL) 150 MG TB24 tablet Take 1 tablet (150 mg total) by mouth once daily. 30 tablet 2    FLUoxetine (PROZAC) 20 MG capsule Take 1 capsule (20 mg total) by mouth once daily. 30 capsule 2    hydrOXYzine pamoate (VISTARIL) 25 MG Cap Take 1 capsule (25 mg total) by mouth every 6 (six) hours as needed (anxiety or insomnia). 60 capsule 2    norgestimate-ethinyl estradiol (ORTHO TRI-CYCLEN LO) 0.18/0.215/0.25 mg-25 mcg tablet Take 1 tablet by mouth once daily. 30 tablet 11    ondansetron (ZOFRAN) 4 MG tablet Take 1 tablet (4 mg total) by mouth every 6 (six) hours as needed for Nausea. 12 tablet 0     No facility-administered encounter medications on file as of 7/27/2018.            Assessment - Diagnosis - Goals:     Impression: - Continue Prozac 20 mg daily for anxiety and depression.  Discussed potential for GI side effects, sexual dysfunction, mood destabilization, headaches.   - Continue Wellbutrin  mg qd for sexual  side effects, depression and fatigue.       Strengths and Liabilities: Strength: Patient accepts guidance/feedback, Strength: Patient is expressive/articulate., Strength: Patient is intelligent., Strength: Patient is motivated for change., Strength: Patient is physically healthy., Strength: Patient has positive support network., Strength: Patient has reasonable judgment., Strength: Patient is stable.    Treatment Goals:  Specify outcomes written in observable, behavioral terms:   Anxiety: acquiring relapse prevention skills  Depression: acquiring relapse prevention skills    Treatment Plan/Recommendations:   · Medication Management: Continue current medications.      Return to Clinic: 5 months    Counseling time: 15 minutes  Total time: 20 minutes  Consulting clinician was informed of the encounter and consult note.

## 2018-10-07 NOTE — PROGRESS NOTES
This encounter was reviewed by me and case was discussed with the resident physician. I agree with the assessment and  treatment plan as stated.    Genevieve Major MD

## 2018-12-21 ENCOUNTER — OFFICE VISIT (OUTPATIENT)
Dept: PSYCHIATRY | Facility: CLINIC | Age: 28
End: 2018-12-21
Payer: COMMERCIAL

## 2018-12-21 VITALS
HEART RATE: 79 BPM | SYSTOLIC BLOOD PRESSURE: 121 MMHG | BODY MASS INDEX: 22.23 KG/M2 | HEIGHT: 63 IN | WEIGHT: 125.44 LBS | DIASTOLIC BLOOD PRESSURE: 77 MMHG

## 2018-12-21 DIAGNOSIS — F41.8 ANXIETY WITH DEPRESSION: Primary | ICD-10-CM

## 2018-12-21 PROCEDURE — 99214 OFFICE O/P EST MOD 30 MIN: CPT | Mod: S$GLB,,, | Performed by: PSYCHIATRY & NEUROLOGY

## 2018-12-21 PROCEDURE — 3008F BODY MASS INDEX DOCD: CPT | Mod: CPTII,S$GLB,, | Performed by: PSYCHIATRY & NEUROLOGY

## 2018-12-21 PROCEDURE — 99999 PR PBB SHADOW E&M-EST. PATIENT-LVL II: CPT | Mod: PBBFAC,,, | Performed by: PSYCHIATRY & NEUROLOGY

## 2018-12-21 RX ORDER — BUPROPION HYDROCHLORIDE 150 MG/1
150 TABLET ORAL DAILY
Qty: 30 TABLET | Refills: 5 | Status: SHIPPED | OUTPATIENT
Start: 2018-12-21 | End: 2019-03-01

## 2018-12-21 RX ORDER — FLUOXETINE HYDROCHLORIDE 20 MG/1
20 CAPSULE ORAL DAILY
Qty: 30 CAPSULE | Refills: 5 | Status: SHIPPED | OUTPATIENT
Start: 2018-12-21 | End: 2019-09-24 | Stop reason: SDUPTHER

## 2018-12-21 RX ORDER — ALPRAZOLAM 0.5 MG/1
0.5 TABLET ORAL DAILY PRN
Qty: 15 TABLET | Refills: 1 | Status: SHIPPED | OUTPATIENT
Start: 2018-12-21 | End: 2019-10-10

## 2018-12-21 NOTE — PROGRESS NOTES
Outpatient Psychiatry Follow-Up Visit (MD/NP)    12/21/2018    Clinical Status of Patient:  Outpatient (Ambulatory)    Chief Complaint:  Linda Rich is a 28 y.o. female who presents today for follow-up of depression and anxiety.  Met with patient.      Interval History and Content of Current Session:  Interim Events/Subjective Report/Content of Current Session: Patient seen in clinic this afternoon. She reports that things have been going well. She has plans to spend time with her family for [place]. Her medication has been working well. She had an episode of anxiety for three days and she was worried that her medication wasn't working however she was able to break out of the anxious episode. She was apprehensive about taking Vistaril for her anxiety due to feeling groggy the next day when she takes the medication. She has been eating and sleeping well. She has been sleeping roughly 8 hours a day. She denies any physical complaints. She states that she has been medication compliant and denies any side effects. She denies any physical complaints. She denies any SI/HI/AVH.     Psychotherapy:  · Target symptoms: depression, anxiety   · Why chosen therapy is appropriate versus another modality: relevant to diagnosis  · Outcome monitoring methods: self-report  · Therapeutic intervention type: supportive psychotherapy  · Topics discussed/themes: work stress  · The patient's response to the intervention is accepting. The patient's progress toward treatment goals is good.   · Duration of intervention: 8 minutes.    Review of Systems   · PSYCHIATRIC: Pertinant items are noted in the narrative.    Past Medical, Family and Social History: The patient's past medical, family and social history have been reviewed and updated as appropriate within the electronic medical record - see encounter notes.    Compliance: yes    Side effects: None    Risk Parameters:  Patient reports no suicidal ideation  Patient reports no  "homicidal ideation  Patient reports no self-injurious behavior  Patient reports no violent behavior    Exam (detailed: at least 9 elements; comprehensive: all 15 elements)   Constitutional  Vitals:  Most recent vital signs, dated less than 90 days prior to this appointment, were reviewed.   Vitals:    12/21/18 1416   BP: 121/77   Pulse: 79   Weight: 56.9 kg (125 lb 7.1 oz)   Height: 5' 3" (1.6 m)        General:  unremarkable, age appropriate     Musculoskeletal  Muscle Strength/Tone:  not examined   Gait & Station:  non-ataxic     Psychiatric  Speech:  no latency; no press   Mood & Affect:  euthymic  congruent and appropriate   Thought Process:  normal and logical   Associations:  intact   Thought Content:  normal, no suicidality, no homicidality, delusions, or paranoia   Insight:  intact   Judgement: behavior is adequate to circumstances   Orientation:  grossly intact   Memory: intact for content of interview   Language: grossly intact   Attention Span & Concentration:  able to focus   Fund of Knowledge:  intact and appropriate to age and level of education     Assessment and Diagnosis   Status/Progress: Based on the examination today, the patient's problem(s) is/are well controlled.  New problems have not been presented today.   Co-morbidities, Diagnostic uncertainty and Lack of compliance are not complicating management of the primary condition.      General Impression:       ICD-10-CM ICD-9-CM   1. Anxiety with depression F41.8 300.4       Intervention/Counseling/Treatment Plan   Impression: - Continue Prozac 20 mg daily for anxiety and depression.  Discussed potential for GI side effects, sexual dysfunction, mood destabilization, headaches.   - Continue Wellbutrin  mg qd for sexual side effects, depression and fatigue.   -Initiate Xanax 0.5 mg PO daily PRN for panic attacks due to patient feeling groggy on as needed Vistaril which she takes 2-3 times every 6 months, Dispense 15 w/ 1 refill "         Strengths and Liabilities: Strength: Patient accepts guidance/feedback, Strength: Patient is expressive/articulate., Strength: Patient is intelligent., Strength: Patient is motivated for change., Strength: Patient is physically healthy., Strength: Patient has positive support network., Strength: Patient has reasonable judgment., Strength: Patient is stable.     Treatment Goals:  Specify outcomes written in observable, behavioral terms:   Anxiety: acquiring relapse prevention skills  Depression: acquiring relapse prevention skills     Treatment Plan/Recommendations:   · Medication Management: Continue current medications.        Return to Clinic: 6 months     Counseling time: 15 minutes  Total time: 20 minutes  Consulting clinician was informed of the encounter and consult note.

## 2019-03-01 ENCOUNTER — OFFICE VISIT (OUTPATIENT)
Dept: PSYCHIATRY | Facility: CLINIC | Age: 29
End: 2019-03-01
Payer: COMMERCIAL

## 2019-03-01 VITALS
WEIGHT: 129.31 LBS | HEART RATE: 79 BPM | BODY MASS INDEX: 25.39 KG/M2 | HEIGHT: 60 IN | DIASTOLIC BLOOD PRESSURE: 80 MMHG | SYSTOLIC BLOOD PRESSURE: 121 MMHG

## 2019-03-01 DIAGNOSIS — F41.8 ANXIETY WITH DEPRESSION: Primary | ICD-10-CM

## 2019-03-01 PROCEDURE — 3008F BODY MASS INDEX DOCD: CPT | Mod: CPTII,S$GLB,, | Performed by: PSYCHIATRY & NEUROLOGY

## 2019-03-01 PROCEDURE — 99214 PR OFFICE/OUTPT VISIT, EST, LEVL IV, 30-39 MIN: ICD-10-PCS | Mod: S$GLB,,, | Performed by: PSYCHIATRY & NEUROLOGY

## 2019-03-01 PROCEDURE — 99999 PR PBB SHADOW E&M-EST. PATIENT-LVL II: ICD-10-PCS | Mod: PBBFAC,,, | Performed by: PSYCHIATRY & NEUROLOGY

## 2019-03-01 PROCEDURE — 3008F PR BODY MASS INDEX (BMI) DOCUMENTED: ICD-10-PCS | Mod: CPTII,S$GLB,, | Performed by: PSYCHIATRY & NEUROLOGY

## 2019-03-01 PROCEDURE — 99214 OFFICE O/P EST MOD 30 MIN: CPT | Mod: S$GLB,,, | Performed by: PSYCHIATRY & NEUROLOGY

## 2019-03-01 PROCEDURE — 99999 PR PBB SHADOW E&M-EST. PATIENT-LVL II: CPT | Mod: PBBFAC,,, | Performed by: PSYCHIATRY & NEUROLOGY

## 2019-03-01 RX ORDER — BUPROPION HYDROCHLORIDE 300 MG/1
300 TABLET ORAL DAILY
Qty: 30 TABLET | Refills: 5 | Status: SHIPPED | OUTPATIENT
Start: 2019-03-01 | End: 2019-10-10

## 2019-03-01 NOTE — PROGRESS NOTES
Outpatient Psychiatry Follow-Up Visit (MD/NP)    3/1/2019    Clinical Status of Patient:  Outpatient (Ambulatory)    Chief Complaint:  Linda Rich is a 28 y.o. female who presents today for follow-up of depression and anxiety.  Met with patient.      Interval History and Content of Current Session:  Interim Events/Subjective Report/Content of Current Session: Patient seen in clinic this afternoon. She reports that over the past few months she has not been feeling well. She states that she has been feeling very tired recently and not motivated to do anything. She states that her house is a mess and her anxiety has increased. She has been having some physical symptoms to her anxiety. She has been dealing with nausea and feeling like her chest is tight. She states that she has been cutting the Xanax in half because if she takes too much she gets groggy. She states that she has been sleeping a lot but she is eating well. She has been medication compliant and denies any side effects. The most stressful thing that happened recently was that her dog bit someone and he had to be quarantined for the past 10 days. She reports having an issue with motivation and anxiety. She states that she has been taking 0.25 mg of Xanax 1-2 times weekly whenever she gets very anxious. She denies any SI/HI/AVH.     Psychotherapy:  · Target symptoms: depression, anxiety   · Why chosen therapy is appropriate versus another modality: relevant to diagnosis  · Outcome monitoring methods: self-report  · Therapeutic intervention type: supportive psychotherapy  · Topics discussed/themes: work stress, difficulty managing affect in interpersonal relationships, life stage transitional issues  · The patient's response to the intervention is accepting. The patient's progress toward treatment goals is good.   · Duration of intervention: 11 minutes.    Review of Systems   · PSYCHIATRIC: Pertinant items are noted in the narrative.    Past Medical,  Family and Social History: The patient's past medical, family and social history have been reviewed and updated as appropriate within the electronic medical record - see encounter notes.    Compliance: yes    Side effects: None    Risk Parameters:  Patient reports no suicidal ideation  Patient reports no homicidal ideation  Patient reports no self-injurious behavior  Patient reports no violent behavior    Exam (detailed: at least 9 elements; comprehensive: all 15 elements)   Constitutional  Vitals:  Most recent vital signs, dated less than 90 days prior to this appointment, were reviewed.   Vitals:    03/01/19 1632   BP: 121/80   Pulse: 79   Weight: 58.7 kg (129 lb 4.8 oz)   Height: 5' (1.524 m)        General:  unremarkable, age appropriate     Musculoskeletal  Muscle Strength/Tone:  no spasicity, no rigidity   Gait & Station:  non-ataxic      Psychiatric  Speech:  no latency; no press   Mood & Affect:  anxious  congruent and appropriate, anxious   Thought Process:  normal and logical   Associations:  intact   Thought Content:  normal, no suicidality, no homicidality, delusions, or paranoia   Insight:  intact   Judgement: behavior is adequate to circumstances   Orientation:  grossly intact   Memory: intact for content of interview   Language: grossly intact   Attention Span & Concentration:  able to focus   Fund of Knowledge:  intact and appropriate to age and level of education     Assessment and Diagnosis   Status/Progress: Based on the examination today, the patient's problem(s) is/are worsening.  New problems have been presented today.   Co-morbidities, Diagnostic uncertainty and Lack of compliance are not complicating management of the primary condition.      General Impression:       ICD-10-CM ICD-9-CM   1. Anxiety with depression F41.8 300.4       Intervention/Counseling/Treatment Plan   Impression: - Continue Prozac 20 mg daily for anxiety and depression.  Discussed potential for GI side effects, sexual  dysfunction, mood destabilization, headaches.   - Increase Wellbutrin  mg qd for sexual side effects, depression and fatigue.   -Continue Xanax 0.5 mg PO daily PRN for panic attacks due to patient feeling groggy on as needed Vistaril which she takes 2-3 times every 6 months        Strengths and Liabilities: Strength: Patient accepts guidance/feedback, Strength: Patient is expressive/articulate., Strength: Patient is intelligent., Strength: Patient is motivated for change., Strength: Patient is physically healthy., Strength: Patient has positive support network., Strength: Patient has reasonable judgment., Strength: Patient is stable.     Treatment Goals:  Specify outcomes written in observable, behavioral terms:   Anxiety: acquiring relapse prevention skills  Depression: acquiring relapse prevention skills     Treatment Plan/Recommendations:   · Medication Management: Continue current medications.        Return to Clinic: 6 weeks     Counseling time: 15 minutes  Total time: 20 minutes  Consulting clinician was informed of the encounter and consult note.

## 2019-09-24 RX ORDER — FLUOXETINE HYDROCHLORIDE 20 MG/1
20 CAPSULE ORAL DAILY
Qty: 30 CAPSULE | Refills: 0 | Status: SHIPPED | OUTPATIENT
Start: 2019-09-24 | End: 2019-10-10

## 2019-10-10 ENCOUNTER — OFFICE VISIT (OUTPATIENT)
Dept: PSYCHIATRY | Facility: CLINIC | Age: 29
End: 2019-10-10
Payer: COMMERCIAL

## 2019-10-10 VITALS
WEIGHT: 132.06 LBS | SYSTOLIC BLOOD PRESSURE: 119 MMHG | BODY MASS INDEX: 25.79 KG/M2 | HEART RATE: 89 BPM | DIASTOLIC BLOOD PRESSURE: 60 MMHG

## 2019-10-10 DIAGNOSIS — F41.8 ANXIETY WITH DEPRESSION: Primary | ICD-10-CM

## 2019-10-10 PROCEDURE — 99999 PR PBB SHADOW E&M-EST. PATIENT-LVL II: CPT | Mod: PBBFAC,,, | Performed by: PSYCHIATRY & NEUROLOGY

## 2019-10-10 PROCEDURE — 99213 PR OFFICE/OUTPT VISIT, EST, LEVL III, 20-29 MIN: ICD-10-PCS | Mod: S$GLB,,, | Performed by: PSYCHIATRY & NEUROLOGY

## 2019-10-10 PROCEDURE — 3008F PR BODY MASS INDEX (BMI) DOCUMENTED: ICD-10-PCS | Mod: CPTII,S$GLB,, | Performed by: PSYCHIATRY & NEUROLOGY

## 2019-10-10 PROCEDURE — 99999 PR PBB SHADOW E&M-EST. PATIENT-LVL II: ICD-10-PCS | Mod: PBBFAC,,, | Performed by: PSYCHIATRY & NEUROLOGY

## 2019-10-10 PROCEDURE — 3008F BODY MASS INDEX DOCD: CPT | Mod: CPTII,S$GLB,, | Performed by: PSYCHIATRY & NEUROLOGY

## 2019-10-10 PROCEDURE — 99213 OFFICE O/P EST LOW 20 MIN: CPT | Mod: S$GLB,,, | Performed by: PSYCHIATRY & NEUROLOGY

## 2019-10-10 RX ORDER — FLUOXETINE HYDROCHLORIDE 20 MG/1
20 CAPSULE ORAL DAILY
Qty: 30 CAPSULE | Refills: 3 | Status: SHIPPED | OUTPATIENT
Start: 2019-10-10 | End: 2020-01-02

## 2019-10-10 RX ORDER — BUPROPION HYDROCHLORIDE 150 MG/1
150 TABLET ORAL DAILY
Qty: 30 TABLET | Refills: 3 | Status: SHIPPED | OUTPATIENT
Start: 2019-10-10 | End: 2020-01-02

## 2019-10-10 RX ORDER — ALPRAZOLAM 0.5 MG/1
0.5 TABLET ORAL DAILY PRN
Qty: 15 TABLET | Refills: 0 | Status: SHIPPED | OUTPATIENT
Start: 2019-10-10 | End: 2020-05-29

## 2019-10-10 NOTE — PATIENT INSTRUCTIONS
Alprazolam tablets  What is this medicine?  ALPRAZOLAM (al PRAY winnie hall) is a benzodiazepine. It is used to treat anxiety and panic attacks.  How should I use this medicine?  Take this medicine by mouth with a glass of water. Follow the directions on the prescription label. Take your medicine at regular intervals. Do not take it more often than directed. Do not stop taking except on your doctor's advice.  A special MedGuide will be given to you by the pharmacist with each prescription and refill. Be sure to read this information carefully each time.  Talk to your pediatrician regarding the use of this medicine in children. Special care may be needed.  What side effects may I notice from receiving this medicine?  Side effects that you should report to your doctor or health care professional as soon as possible:  · allergic reactions like skin rash, itching or hives, swelling of the face, lips, or tongue  · breathing problems  · confusion  · loss of balance or coordination  · signs and symptoms of low blood pressure like dizziness; feeling faint or lightheaded, falls; unusually weak or tired  · suicidal thoughts or other mood changes  Side effects that usually do not require medical attention (report to your doctor or health care professional if they continue or are bothersome):  · dizziness  · dry mouth  · nausea, vomiting  · tiredness  What may interact with this medicine?  Do not take this medicine with any of the following medications:  · certain antiviral medicines for HIV or AIDS like delavirdine, indinavir  · certain medicines for fungal infections like ketoconazole and itraconazole  · narcotic medicines for cough  · sodium oxybate  This medicine may also interact with the following medications:  · alcohol  · antihistamines for allergy, cough and cold  · certain antibiotics like clarithromycin, erythromycin, isoniazid, rifampin, rifapentine, rifabutin, and troleandomycin  · certain medicines for blood  pressure, heart disease, irregular heart beat  · certain medicines for depression, like amitriptyline, fluoxetine, sertraline  · certain medicines for seizures like carbamazepine, oxcarbazepine, phenobarbital, phenytoin, primidone  · cimetidine  · cyclosporine  · female hormones, like estrogens or progestins and birth control pills, patches, rings, or injections  · general anesthetics like halothane, isoflurane, methoxyflurane, propofol  · grapefruit juice  · local anesthetics like lidocaine, pramoxine, tetracaine  · medicines that relax muscles for surgery  · narcotic medicines for pain  · other antiviral medicines for HIV or AIDS  · phenothiazines like chlorpromazine, mesoridazine, prochlorperazine, thioridazine  What if I miss a dose?  If you miss a dose, take it as soon as you can. If it is almost time for your next dose, take only that dose. Do not take double or extra doses.  Where should I keep my medicine?  Keep out of the reach of children. This medicine can be abused. Keep your medicine in a safe place to protect it from theft. Do not share this medicine with anyone. Selling or giving away this medicine is dangerous and against the law.  Store at room temperature between 20 and 25 degrees C (68 and 77 degrees F). This medicine may cause accidental overdose and death if taken by other adults, children, or pets. Mix any unused medicine with a substance like cat litter or coffee grounds. Then throw the medicine away in a sealed container like a sealed bag or a coffee can with a lid. Do not use the medicine after the expiration date.  What should I tell my health care provider before I take this medicine?  They need to know if you have any of these conditions:  · an alcohol or drug abuse problem  · bipolar disorder, depression, psychosis or other mental health conditions  · glaucoma  · kidney or liver disease  · lung or breathing disease  · myasthenia gravis  · Parkinson's disease  · porphyria  · seizures or a  history of seizures  · suicidal thoughts  · an unusual or allergic reaction to alprazolam, other benzodiazepines, foods, dyes, or preservatives  · pregnant or trying to get pregnant  · breast-feeding  What should I watch for while using this medicine?  Tell your doctor or health care professional if your symptoms do not start to get better or if they get worse.  Do not stop taking except on your doctor's advice. You may develop a severe reaction. Your doctor will tell you how much medicine to take.  You may get drowsy or dizzy. Do not drive, use machinery, or do anything that needs mental alertness until you know how this medicine affects you. To reduce the risk of dizzy and fainting spells, do not stand or sit up quickly, especially if you are an older patient. Alcohol may increase dizziness and drowsiness. Avoid alcoholic drinks.  If you are taking another medicine that also causes drowsiness, you may have more side effects. Give your health care provider a list of all medicines you use. Your doctor will tell you how much medicine to take. Do not take more medicine than directed. Call emergency for help if you have problems breathing or unusual sleepiness.  NOTE:This sheet is a summary. It may not cover all possible information. If you have questions about this medicine, talk to your doctor, pharmacist, or health care provider. Copyright© 2017 [employer]        Bupropion extended-release tablets (Depression/Mood Disorders)  What is this medicine?  BUPROPION (byoo PROE pee on) is used to treat depression.  How should I use this medicine?  Take this medicine by mouth with a glass of water. Follow the directions on the prescription label. You can take it with or without food. If it upsets your stomach, take it with food. Do not crush, chew, or cut these tablets. This medicine is taken once daily at the same time each day. Do not take your medicine more often than directed. Do not stop taking this medicine suddenly  except upon the advice of your doctor. Stopping this medicine too quickly may cause serious side effects or your condition may worsen.  A special MedGuide will be given to you by the pharmacist with each prescription and refill. Be sure to read this information carefully each time.  Talk to your pediatrician regarding the use of this medicine in children. Special care may be needed.  What side effects may I notice from receiving this medicine?  Side effects that you should report to your doctor or health care professional as soon as possible:  · allergic reactions like skin rash, itching or hives, swelling of the face, lips, or tongue  · breathing problems  · changes in vision  · confusion  · fast or irregular heartbeat  · hallucinations  · increased blood pressure  · redness, blistering, peeling or loosening of the skin, including inside the mouth  · seizures  · suicidal thoughts or other mood changes  · unusually weak or tired  · vomiting  Side effects that usually do not require medical attention (report to your doctor or health care professional if they continue or are bothersome):  · change in sex drive or performance  · constipation  · headache  · loss of appetite  · nausea  · tremors  · weight loss  What may interact with this medicine?  Do not take this medicine with any of the following medications:  · linezolid  · MAOIs like Azilect, Carbex, Eldepryl, Marplan, Nardil, and Parnate  · methylene blue (injected into a vein)  · other medicines that contain bupropion like Zyban  This medicine may also interact with the following medications:  · alcohol  · certain medicines for anxiety or sleep  · certain medicines for blood pressure like metoprolol, propranolol  · certain medicines for depression or psychotic disturbances  · certain medicines for HIV or AIDS like efavirenz, lopinavir, nelfinavir, ritonavir  · certain medicines for irregular heart beat like propafenone, flecainide  · certain medicines for  Parkinson's disease like amantadine, levodopa  · certain medicines for seizures like carbamazepine, phenytoin, phenobarbital  · cimetidine  · clopidogrel  · cyclophosphamide  · furazolidone  · isoniazid  · nicotine  · orphenadrine  · procarbazine  · steroid medicines like prednisone or cortisone  · stimulant medicines for attention disorders, weight loss, or to stay awake  · tamoxifen  · theophylline  · thiotepa  · ticlopidine  · tramadol  · warfarin  What if I miss a dose?  If you miss a dose, skip the missed dose and take your next tablet at the regular time. Do not take double or extra doses.  Where should I keep my medicine?  Keep out of the reach of children.  Store at room temperature between 15 and 30 degrees C (59 and 86 degrees F). Throw away any unused medicine after the expiration date.  What should I tell my health care provider before I take this medicine?  They need to know if you have any of these conditions:  · an eating disorder, such as anorexia or bulimia  · bipolar disorder or psychosis  · diabetes or high blood sugar, treated with medication  · glaucoma  · head injury or brain tumor  · heart disease, previous heart attack, or irregular heart beat  · high blood pressure  · kidney or liver disease  · seizures (convulsions)  · suicidal thoughts or a previous suicide attempt  · Tourette's syndrome  · weight loss  · an unusual or allergic reaction to bupropion, other medicines, foods, dyes, or preservatives  · breast-feeding  · pregnant or trying to become pregnant  What should I watch for while using this medicine?  Tell your doctor if your symptoms do not get better or if they get worse. Visit your doctor or health care professional for regular checks on your progress. Because it may take several weeks to see the full effects of this medicine, it is important to continue your treatment as prescribed by your doctor.  Patients and their families should watch out for new or worsening thoughts of  suicide or depression. Also watch out for sudden changes in feelings such as feeling anxious, agitated, panicky, irritable, hostile, aggressive, impulsive, severely restless, overly excited and hyperactive, or not being able to sleep. If this happens, especially at the beginning of treatment or after a change in dose, call your health care professional.  Avoid alcoholic drinks while taking this medicine. Drinking large amounts of alcoholic beverages, using sleeping or anxiety medicines, or quickly stopping the use of these agents while taking this medicine may increase your risk for a seizure.  Do not drive or use heavy machinery until you know how this medicine affects you. This medicine can impair your ability to perform these tasks.  Do not take this medicine close to bedtime. It may prevent you from sleeping.  Your mouth may get dry. Chewing sugarless gum or sucking hard candy, and drinking plenty of water may help. Contact your doctor if the problem does not go away or is severe.  The tablet shell for some brands of this medicine does not dissolve. This is normal. The tablet shell may appear whole in the stool. This is not a cause for concern.  NOTE:This sheet is a summary. It may not cover all possible information. If you have questions about this medicine, talk to your doctor, pharmacist, or health care provider. Copyright© 2017 [employer]        Fluoxetine capsules or tablets (Depression/Mood Disorders)  What is this medicine?  FLUOXETINE (floo OX e teen) belongs to a class of drugs known as selective serotonin reuptake inhibitors (SSRIs). It helps to treat mood problems such as depression, obsessive compulsive disorder, and panic attacks. It can also treat certain eating disorders.  How should I use this medicine?  Take this medicine by mouth with a glass of water. Follow the directions on the prescription label. You can take this medicine with or without food. Take your medicine at regular intervals. Do  not take it more often than directed. Do not stop taking this medicine suddenly except upon the advice of your doctor. Stopping this medicine too quickly may cause serious side effects or your condition may worsen.  A special MedGuide will be given to you by the pharmacist with each prescription and refill. Be sure to read this information carefully each time.  Talk to your pediatrician regarding the use of this medicine in children. While this drug may be prescribed for children as young as 7 years for selected conditions, precautions do apply.  What side effects may I notice from receiving this medicine?  Side effects that you should report to your doctor or health care professional as soon as possible:  · allergic reactions like skin rash, itching or hives, swelling of the face, lips, or tongue  · breathing problems  · confusion  · eye pain, changes in vision  · fast or irregular heart rate, palpitations  · flu-like fever, chills, cough, muscle or joint aches and pains  · seizures  · suicidal thoughts or other mood changes  · swelling or redness in or around the eye  · tremors  · trouble sleeping  · unusual bleeding or bruising  · unusually tired or weak  · vomiting  Side effects that usually do not require medical attention (report to your doctor or health care professional if they continue or are bothersome):  · change in sex drive or performance  · diarrhea  · dry mouth  · flushing  · headache  · increased or decreased appetite  · nausea  · sweating  What may interact with this medicine?  Do not take fluoxetine with any of the following medications:  · other medicines containing fluoxetine, like Sarafem or Symbyax  · cisapride  · linezolid  · MAOIs like Carbex, Eldepryl, Marplan, Nardil, and Parnate  · methylene blue (injected into a vein)  · pimozide  · thioridazine  This medicine may also interact with the following medications:  · alcohol  · aspirin and aspirin-like medicines  · carbamazepine  · certain  medicines for depression, anxiety, or psychotic disturbances  · certain medicines for migraine headaches like almotriptan, eletriptan, frovatriptan, naratriptan, rizatriptan, sumatriptan, zolmitriptan  · digoxin  · diuretics  · fentanyl  · flecainide  · furazolidone  · isoniazid  · lithium  · medicines for sleep  · medicines that treat or prevent blood clots like warfarin, enoxaparin, and dalteparin  · NSAIDs, medicines for pain and inflammation, like ibuprofen or naproxen  · phenytoin  · procarbazine  · propafenone  · rasagiline  · ritonavir  · supplements like Amita's wort, kava kava, valerian  · tramadol  · tryptophan  · vinblastine  What if I miss a dose?  If you miss a dose, skip the missed dose and go back to your regular dosing schedule. Do not take double or extra doses.  Where should I keep my medicine?  Keep out of the reach of children.  Store at room temperature between 15 and 30 degrees C (59 and 86 degrees F). Throw away any unused medicine after the expiration date.  What should I tell my health care provider before I take this medicine?  They need to know if you have any of these conditions:  · bipolar disorder or kimberlee  · diabetes  · glaucoma  · liver disease  · psychosis  · seizures  · suicidal thoughts or history of attempted suicide  · an unusual or allergic reaction to fluoxetine, other medicines, foods, dyes, or preservatives  · pregnant or trying to get pregnant  · breast-feeding  What should I watch for while using this medicine?  Tell your doctor if your symptoms do not get better or if they get worse. Visit your doctor or health care professional for regular checks on your progress. Because it may take several weeks to see the full effects of this medicine, it is important to continue your treatment as prescribed by your doctor.  Patients and their families should watch out for new or worsening thoughts of suicide or depression. Also watch out for sudden changes in feelings such as  feeling anxious, agitated, panicky, irritable, hostile, aggressive, impulsive, severely restless, overly excited and hyperactive, or not being able to sleep. If this happens, especially at the beginning of treatment or after a change in dose, call your health care professional.  You may get drowsy or dizzy. Do not drive, use machinery, or do anything that needs mental alertness until you know how this medicine affects you. Do not stand or sit up quickly, especially if you are an older patient. This reduces the risk of dizzy or fainting spells. Alcohol may interfere with the effect of this medicine. Avoid alcoholic drinks.  Your mouth may get dry. Chewing sugarless gum or sucking hard candy, and drinking plenty of water may help. Contact your doctor if the problem does not go away or is severe.  This medicine may affect blood sugar levels. If you have diabetes, check with your doctor or health care professional before you change your diet or the dose of your diabetic medicine.  NOTE:This sheet is a summary. It may not cover all possible information. If you have questions about this medicine, talk to your doctor, pharmacist, or health care provider. Copyright© 2017 [employer]

## 2019-10-10 NOTE — PROGRESS NOTES
"Outpatient Psychiatry Follow-Up Visit (MD/NP)    10/10/2019    Clinical Status of Patient:  Outpatient (Ambulatory)    Chief Complaint:  Linda Rich is a 29 y.o. female who presents today for follow-up of depression and anxiety.  Patient previously followed by Dr Velasquez. Met with patient.      Interval History and Content of Current Session:  Interim Events/Subjective Report/Content of Current Session:   Patient arrived 30 minutes late to her appointment reporting that she was mistaken as to her scheduled time.    Reports that she is continuing to take all of her medications Prozac 20 mg PO daily, Wellbutrin 150 mg PO daily, and rarely PRN xanax for panic attacks. Reports tolerating these medications well with no reported side effects. Had tried increasing to Wellbutrin 300 mg PO daily as dicussed in her last appointment with Dr. Velasquez but states she did not tolerate the dose increase. Experienced GI side effects and increased restlessness/ arousal.     Reports she had a mood slump after episode of bronchitis in February and after having to quarantine her dog for a brief period after he bit someone. Says the case has now been dropped.  Reports that mood has improved since that time. 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 Mood today described as "good". Denies anhedonia. Reports that she sleeps well but sleeps "a lot" estimates 7-8 hours per night but in addition to an afternoon nap, denies issue with energy level, appetite, or concentration. Intermittent feelings of guilt but denies hopelessness. Denies any suicidal ideation/homicidal ideation and further denies any plan or intention for self harm or harm to to others. Denies AVH. Anxiety has been "good" but fluctuates depending on the situation. Has  Not had a panic attack in "a long time". Estimates she takes xanax once every few months.     Review of Systems   · PSYCHIATRIC: Pertinant items " are noted in the narrative.  · CONSTITUTIONAL: Denies fevers   · RESPIRATORY: Denies SOB, cough  · CARDIOVASCULAR: No tachycardia or chest pain.  · GASTROINTESTINAL: No nausea, vomiting, pain, constipation or diarrhea.    Past Medical, Family and Social History: The patient's past medical, family and social history have been reviewed and updated as appropriate within the electronic medical record - see encounter notes.    Compliance: yes    Side effects: None - previously with sexual side effects with Prozac but resolved with addition of wellbutrin     Risk Parameters:  Patient reports no suicidal ideation  Patient reports no homicidal ideation  Patient reports no self-injurious behavior  Patient reports no violent behavior    Exam (detailed: at least 9 elements; comprehensive: all 15 elements)   Constitutional  Vitals:  Most recent vital signs, dated less than 90 days prior to this appointment, were reviewed.   Vitals:    10/10/19 0934   BP: 119/60   Pulse: 89   Weight: 59.9 kg (132 lb 0.9 oz)        General:  unremarkable, age appropriate     Musculoskeletal  Muscle Strength/Tone:  not examined   Gait & Station:  non-ataxic      Psychiatric  Speech:  no latency; no press   Mood & Affect:  euthymic  congruent and appropriate, constricted but reactive   Thought Process:  normal and logical   Associations:  intact   Thought Content:  normal, no suicidality, no homicidality, delusions, or paranoia   Insight:  intact   Judgement: behavior is adequate to circumstances   Orientation:  grossly intact   Memory: intact for content of interview   Language: grossly intact   Attention Span & Concentration:  able to focus   Fund of Knowledge:  intact and appropriate to age and level of education     Assessment and Diagnosis   Status/Progress: Based on the examination today, the patient's problem(s) is/are improved.  New problems have not been presented today.   Co-morbidities, Diagnostic uncertainty and Lack of compliance are  not complicating management of the primary condition.      General Impression:       ICD-10-CM ICD-9-CM   1. Anxiety with depression F41.8 300.4       Intervention/Counseling/Treatment Plan      Treatment Plan/Recommendations:   - Continue Prozac 20 mg daily for anxiety and depression.  Refilled x 4 months.  - Continue Wellbutrin  mg qd. Refilled x 4 months   -Continue Xanax 0.5 mg PO daily PRN for panic attacks. Prescription for 15 tablets provided.   - Encouraged regular aerobic exercise for potential benefits to mood and genral health   - Encouraged patient to establish care with a PCP     Medication Management: Discussed with patient informed consent including diagnosis, risks and benefits of proposed treatment above vs. alternative treatments vs. no treatment, as well as serious and common side effects of these treatments, and the inherent unpredictability of individual responses to these treatments. The patient expresses understanding of the above and displays the capacity to agree with this current plan. Patient also agrees that, currently, the benefits outweigh the risks and would like to pursue treatment at this time, and had no other questions.Risks/adverse effects discussed including but not limited to    - Prozac: GI side effects, sexual dysfunction, activation/ induction of kimberlee, triggering of suicidal thoughts, serotonin syndrome   -  Wellbutrin:  increased blood pressure, increased heart rate, insomnia, anxiety, seizures, activation/ induction of kimberlee, increased suicidal thoughts upon initiation, serotonin syndrome,   - Xanax: sedation, dizziness . Counseled patient to use medication sparingly and cautiously as an abortive for anxiety that does not tammie. Counseled patient on not driving or operating heavy equipment while taking this medication and not to take in conjunction with sedating medications or alcohol.    Patient instructions:   · Keep future appointments and take medications as  prescribed.   · Abstain from substance use.   · In the event of an emergency, including suicidal ideation, patient was advised to go to the nearest emergency room or call 911.  · Contact with questions. Patient aware that office/EMR communications are for non-emergent issues only and may take up to several days for response     Return to clinic: 3 months or sooner PRN    Case to be discussed with psychiatry attending, Dr. Ana Norwood MD   [facility] Psychiatry, PGY-3  10/10/2019

## 2019-10-18 NOTE — PROGRESS NOTES
STAFF COMMENTS: I have discussed pt with Dr. Norwood and reviewed the history and exam. I agree and concur with the assessment and plan.

## 2019-11-26 ENCOUNTER — OFFICE VISIT (OUTPATIENT)
Dept: INTERNAL MEDICINE | Facility: CLINIC | Age: 29
End: 2019-11-26
Payer: COMMERCIAL

## 2019-11-26 VITALS
OXYGEN SATURATION: 99 % | HEIGHT: 60 IN | DIASTOLIC BLOOD PRESSURE: 70 MMHG | WEIGHT: 135.38 LBS | HEART RATE: 79 BPM | BODY MASS INDEX: 26.58 KG/M2 | SYSTOLIC BLOOD PRESSURE: 128 MMHG

## 2019-11-26 DIAGNOSIS — Z76.89 ENCOUNTER TO ESTABLISH CARE: ICD-10-CM

## 2019-11-26 DIAGNOSIS — R51.9 CHRONIC NONINTRACTABLE HEADACHE, UNSPECIFIED HEADACHE TYPE: ICD-10-CM

## 2019-11-26 DIAGNOSIS — R53.83 FATIGUE, UNSPECIFIED TYPE: Primary | ICD-10-CM

## 2019-11-26 DIAGNOSIS — R63.5 WEIGHT GAIN: ICD-10-CM

## 2019-11-26 DIAGNOSIS — G89.29 CHRONIC NONINTRACTABLE HEADACHE, UNSPECIFIED HEADACHE TYPE: ICD-10-CM

## 2019-11-26 DIAGNOSIS — R53.82 CHRONIC FATIGUE: ICD-10-CM

## 2019-11-26 PROCEDURE — 99999 PR PBB SHADOW E&M-EST. PATIENT-LVL IV: CPT | Mod: PBBFAC,,, | Performed by: STUDENT IN AN ORGANIZED HEALTH CARE EDUCATION/TRAINING PROGRAM

## 2019-11-26 PROCEDURE — 3008F PR BODY MASS INDEX (BMI) DOCUMENTED: ICD-10-PCS | Mod: CPTII,S$GLB,, | Performed by: STUDENT IN AN ORGANIZED HEALTH CARE EDUCATION/TRAINING PROGRAM

## 2019-11-26 PROCEDURE — 99999 PR PBB SHADOW E&M-EST. PATIENT-LVL IV: ICD-10-PCS | Mod: PBBFAC,,, | Performed by: STUDENT IN AN ORGANIZED HEALTH CARE EDUCATION/TRAINING PROGRAM

## 2019-11-26 PROCEDURE — 99203 OFFICE O/P NEW LOW 30 MIN: CPT | Mod: S$GLB,,, | Performed by: STUDENT IN AN ORGANIZED HEALTH CARE EDUCATION/TRAINING PROGRAM

## 2019-11-26 PROCEDURE — 99203 PR OFFICE/OUTPT VISIT, NEW, LEVL III, 30-44 MIN: ICD-10-PCS | Mod: S$GLB,,, | Performed by: STUDENT IN AN ORGANIZED HEALTH CARE EDUCATION/TRAINING PROGRAM

## 2019-11-26 PROCEDURE — 3008F BODY MASS INDEX DOCD: CPT | Mod: CPTII,S$GLB,, | Performed by: STUDENT IN AN ORGANIZED HEALTH CARE EDUCATION/TRAINING PROGRAM

## 2019-11-26 RX ORDER — IBUPROFEN 600 MG/1
600 TABLET ORAL DAILY PRN
Qty: 30 TABLET | Refills: 0 | Status: SHIPPED | OUTPATIENT
Start: 2019-11-26

## 2019-11-26 NOTE — H&P
Clinic Note  11/26/2019      Subjective:       Patient ID:  Linda is a 29 y.o. female being seen for an established visit.    Chief Complaint: Establish Care    Patient is a 30 yo female here to establish care. She has not seen a PCP for quite some time (she estimates 8 years). She states she feels relatively healthy but is concerned as she has never had blood work done, has gained about 15 lbs in 5 months, and has had excessive fatigue as long as she can remember. 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 She takes 2 naps per day on the weekend and believes she cannot get through a social gathering that is more than 5 hours or so without having to nap. She does not have the energy to exercise but walks her dog daily. She thought it could be related to her depression but feels that she is well managed with her depression emotionally otherwise.     She is a vegetarian which she knows could also be a factor. She takes a birth control pill but takes them throughout her cycle so does not get a period.     Patient also has a history of chronic headaches. She used to get them with periods, and she also grinds her teeth at night which she feels may be related. Since August, she has had 2 headaches in which she has had to leave work and go to sleep. The headaches are bilateral in nature, associated with nausea, photophobia and are only relieved with rest/sleep. She takes tylenol when she has these headaches but do not provide relief.                 Review of Systems   Constitutional: Positive for malaise/fatigue. Negative for chills, fever and weight loss.   HENT: Negative for congestion, sinus pain and sore throat.    Eyes: Positive for blurred vision and photophobia. Negative for double vision.   Respiratory: Negative for cough, sputum production and stridor.    Cardiovascular: Negative for chest pain and palpitations.   Gastrointestinal:  Positive for nausea. Negative for abdominal pain, constipation, diarrhea and vomiting.   Genitourinary: Negative for dysuria, frequency and urgency.   Musculoskeletal: Negative for back pain, myalgias and neck pain.   Neurological: Positive for dizziness and headaches. Negative for tingling, sensory change, seizures and weakness.   Psychiatric/Behavioral: Positive for depression. The patient is nervous/anxious.        Past Medical History:   Diagnosis Date    Anxiety     Depression     Hx of psychiatric care     Psychiatric problem     Therapy        Family History   Problem Relation Age of Onset    Hypertension Mother     Hypertension Father         reports that she has never smoked. She has never used smokeless tobacco. She reports that she drinks about 2.0 standard drinks of alcohol per week. She reports that she does not use drugs.    Medication List with Changes/Refills   New Medications    IBUPROFEN (ADVIL,MOTRIN) 600 MG TABLET    Take 1 tablet (600 mg total) by mouth daily as needed for Pain (headache).   Current Medications    ALPRAZOLAM (XANAX) 0.5 MG TABLET    Take 1 tablet (0.5 mg total) by mouth daily as needed for Anxiety.    BUPROPION (WELLBUTRIN XL) 150 MG TB24 TABLET    Take 1 tablet (150 mg total) by mouth once daily.    FLUOXETINE 20 MG CAPSULE    Take 1 capsule (20 mg total) by mouth once daily.    HYDROXYZINE PAMOATE (VISTARIL) 25 MG CAP    Take 1 capsule (25 mg total) by mouth every 6 (six) hours as needed (anxiety or insomnia).    NORGESTIMATE-ETHINYL ESTRADIOL (ORTHO TRI-CYCLEN LO) 0.18/0.215/0.25 MG-25 MCG TABLET    Take 1 tablet by mouth once daily.     Review of patient's allergies indicates:   Allergen Reactions    Cefdinir Diarrhea       Patient Active Problem List   Diagnosis    Anxiety with depression    Panic disorder without agoraphobia with mild panic attacks           Objective:      /70 (BP Location: Right arm, Patient Position: Sitting, BP Method: Medium (Manual))    Pulse 79   Ht 5' (1.524 m)   Wt 61.4 kg (135 lb 5.8 oz)   SpO2 99%   BMI 26.44 kg/m²   Estimated body mass index is 26.44 kg/m² as calculated from the following:    Height as of this encounter: 5' (1.524 m).    Weight as of this encounter: 61.4 kg (135 lb 5.8 oz).      Physical Exam   Constitutional: She is oriented to person, place, and time and well-developed, well-nourished, and in no distress.   HENT:   Head: Normocephalic.   Eyes: Pupils are equal, round, and reactive to light. Conjunctivae and EOM are normal.   Neck: Normal range of motion. Neck supple. No thyromegaly present.   Cardiovascular: Normal rate, regular rhythm, normal heart sounds and intact distal pulses.   No murmur heard.  Pulmonary/Chest: Effort normal and breath sounds normal. No respiratory distress. She has no wheezes.   Abdominal: Soft. Bowel sounds are normal. She exhibits no distension and no mass. There is no tenderness.   Musculoskeletal: Normal range of motion. She exhibits no edema.   Neurological: She is alert and oriented to person, place, and time. No cranial nerve deficit.   Skin: Skin is warm. No rash noted. No erythema.   Psychiatric: Affect and judgment normal.   Vitals reviewed.        Assessment and Plan:     Problem List Items Addressed This Visit     None      Visit Diagnoses     Fatigue, unspecified type    -  Primary  - Patient states she has had excessive fatigue since she was in her teens requiring her to take a nap every day. She feels she cannot get through a full day of activities without a nap.  - Differentials include hypothyroidism, anemia, and ALAN    Relevant Orders    CBC auto differential    TSH    Folate    Vitamin B12    Vitamin D    Ferritin    Iron and TIBC    Encounter to establish care      - Patient has never received blood work and has a family history of high blood pressure.   - Patient is concerned about her cholesterol given she has gained 15 lbs over the past 5 months.     Relevant Orders     CBC auto differential    Comprehensive metabolic panel    TSH    Folate    Vitamin B12    Lipid panel                Other Orders Placed This Visit:  Orders Placed This Encounter   Procedures    CBC auto differential    Comprehensive metabolic panel    TSH    Folate    Vitamin B12    Lipid panel    Vitamin D    Ferritin    Iron and TIBC             Interview, Assessment, Findings, and Plan discussed with Dr. Levi    Follow up in about 2 months (around 1/26/2020). during the next clinic block.     Debbie Syed MD  Internal Medicine   [phone]          I have personally seen and examined patient and agree with the A/P as noted above        Valeria Oneill.

## 2019-11-26 NOTE — PATIENT INSTRUCTIONS
1. Get labs done through [facility]  2. Renew your [facility]  3. Follow up in 3 months unless labs reveal otherwise

## 2019-11-29 LAB
25(OH)D3 SERPL-MCNC: 39 NG/ML (ref 30–100)
ALBUMIN SERPL-MCNC: 3.8 G/DL (ref 3.6–5.1)
ALBUMIN/GLOB SERPL: 1.4 (CALC) (ref 1–2.5)
ALP SERPL-CCNC: 53 U/L (ref 33–115)
ALT SERPL-CCNC: 7 U/L (ref 6–29)
AST SERPL-CCNC: 11 U/L (ref 10–30)
BASOPHILS # BLD AUTO: 40 CELLS/UL (ref 0–200)
BASOPHILS NFR BLD AUTO: 0.6 %
BILIRUB SERPL-MCNC: 0.5 MG/DL (ref 0.2–1.2)
BUN SERPL-MCNC: 9 MG/DL (ref 7–25)
BUN/CREAT SERPL: NORMAL (CALC) (ref 6–22)
CALCIUM SERPL-MCNC: 8.8 MG/DL (ref 8.6–10.2)
CHLORIDE SERPL-SCNC: 106 MMOL/L (ref 98–110)
CHOLEST SERPL-MCNC: 171 MG/DL
CHOLEST/HDLC SERPL: 2.7 (CALC)
CO2 SERPL-SCNC: 25 MMOL/L (ref 20–32)
CREAT SERPL-MCNC: 0.79 MG/DL (ref 0.5–1.1)
EOSINOPHIL # BLD AUTO: 47 CELLS/UL (ref 15–500)
EOSINOPHIL NFR BLD AUTO: 0.7 %
ERYTHROCYTE [DISTWIDTH] IN BLOOD BY AUTOMATED COUNT: 12.8 % (ref 11–15)
FERRITIN SERPL-MCNC: 7 NG/ML (ref 16–154)
FOLATE SERPL-MCNC: 10.9 NG/ML
GFRSERPLBLD MDRD-ARVRAT: 103 ML/MIN/1.73M2
GLOBULIN SER CALC-MCNC: 2.7 G/DL (CALC) (ref 1.9–3.7)
GLUCOSE SERPL-MCNC: 74 MG/DL (ref 65–99)
HCT VFR BLD AUTO: 34.9 % (ref 35–45)
HDLC SERPL-MCNC: 64 MG/DL
HGB BLD-MCNC: 11.5 G/DL (ref 11.7–15.5)
IRON SATN MFR SERPL: 23 % (CALC) (ref 16–45)
IRON SERPL-MCNC: 95 MCG/DL (ref 40–190)
LDLC SERPL CALC-MCNC: 82 MG/DL (CALC)
LYMPHOCYTES # BLD AUTO: 2506 CELLS/UL (ref 850–3900)
LYMPHOCYTES NFR BLD AUTO: 37.4 %
MCH RBC QN AUTO: 29.7 PG (ref 27–33)
MCHC RBC AUTO-ENTMCNC: 33 G/DL (ref 32–36)
MCV RBC AUTO: 90.2 FL (ref 80–100)
MONOCYTES # BLD AUTO: 549 CELLS/UL (ref 200–950)
MONOCYTES NFR BLD AUTO: 8.2 %
NEUTROPHILS # BLD AUTO: 3558 CELLS/UL (ref 1500–7800)
NEUTROPHILS NFR BLD AUTO: 53.1 %
NONHDLC SERPL-MCNC: 107 MG/DL (CALC)
PLATELET # BLD AUTO: 318 THOUSAND/UL (ref 140–400)
PMV BLD REES-ECKER: 10.5 FL (ref 7.5–12.5)
POTASSIUM SERPL-SCNC: 3.9 MMOL/L (ref 3.5–5.3)
PROT SERPL-MCNC: 6.5 G/DL (ref 6.1–8.1)
RBC # BLD AUTO: 3.87 MILLION/UL (ref 3.8–5.1)
SODIUM SERPL-SCNC: 139 MMOL/L (ref 135–146)
TIBC SERPL-MCNC: 421 MCG/DL (CALC) (ref 250–450)
TRIGL SERPL-MCNC: 146 MG/DL
TSH SERPL-ACNC: 4.77 MIU/L
VIT B12 SERPL-MCNC: 217 PG/ML (ref 200–1100)
WBC # BLD AUTO: 6.7 THOUSAND/UL (ref 3.8–10.8)

## 2019-12-02 ENCOUNTER — PATIENT MESSAGE (OUTPATIENT)
Dept: INTERNAL MEDICINE | Facility: CLINIC | Age: 29
End: 2019-12-02

## 2019-12-02 RX ORDER — PNV NO.95/FERROUS FUM/FOLIC AC 28MG-0.8MG
100 TABLET ORAL DAILY
Qty: 30 TABLET | Refills: 3 | Status: SHIPPED | OUTPATIENT
Start: 2019-12-02 | End: 2020-02-12

## 2020-01-02 ENCOUNTER — OFFICE VISIT (OUTPATIENT)
Dept: PSYCHIATRY | Facility: CLINIC | Age: 30
End: 2020-01-02
Payer: COMMERCIAL

## 2020-01-02 VITALS
BODY MASS INDEX: 26.74 KG/M2 | SYSTOLIC BLOOD PRESSURE: 112 MMHG | HEART RATE: 82 BPM | DIASTOLIC BLOOD PRESSURE: 73 MMHG | WEIGHT: 136.88 LBS

## 2020-01-02 DIAGNOSIS — F41.9 ANXIETY: ICD-10-CM

## 2020-01-02 PROBLEM — F32.A DEPRESSION: Status: ACTIVE | Noted: 2020-01-02

## 2020-01-02 PROCEDURE — 99213 PR OFFICE/OUTPT VISIT, EST, LEVL III, 20-29 MIN: ICD-10-PCS | Mod: S$GLB,,, | Performed by: PSYCHIATRY & NEUROLOGY

## 2020-01-02 PROCEDURE — 3008F BODY MASS INDEX DOCD: CPT | Mod: CPTII,S$GLB,, | Performed by: PSYCHIATRY & NEUROLOGY

## 2020-01-02 PROCEDURE — 99999 PR PBB SHADOW E&M-EST. PATIENT-LVL II: CPT | Mod: PBBFAC,,, | Performed by: PSYCHIATRY & NEUROLOGY

## 2020-01-02 PROCEDURE — 3008F PR BODY MASS INDEX (BMI) DOCUMENTED: ICD-10-PCS | Mod: CPTII,S$GLB,, | Performed by: PSYCHIATRY & NEUROLOGY

## 2020-01-02 PROCEDURE — 99999 PR PBB SHADOW E&M-EST. PATIENT-LVL II: ICD-10-PCS | Mod: PBBFAC,,, | Performed by: PSYCHIATRY & NEUROLOGY

## 2020-01-02 PROCEDURE — 99213 OFFICE O/P EST LOW 20 MIN: CPT | Mod: S$GLB,,, | Performed by: PSYCHIATRY & NEUROLOGY

## 2020-01-02 RX ORDER — BUPROPION HYDROCHLORIDE 150 MG/1
150 TABLET ORAL DAILY
Qty: 30 TABLET | Refills: 5 | Status: SHIPPED | OUTPATIENT
Start: 2020-01-02 | End: 2020-05-29

## 2020-01-02 RX ORDER — FLUOXETINE HYDROCHLORIDE 20 MG/1
20 CAPSULE ORAL DAILY
Qty: 30 CAPSULE | Refills: 5 | Status: SHIPPED | OUTPATIENT
Start: 2020-01-02 | End: 2020-05-29

## 2020-01-02 NOTE — PROGRESS NOTES
"Outpatient Psychiatry Follow-Up Visit (MD/NP)    1/2/2020    Clinical Status of Patient:  Outpatient (Ambulatory)    Chief Complaint:  Linda Rich is a 29 y.o. female who presents today for follow-up of depression and anxiety.  Chart reviewed. Met with patient.      Interval History and Content of Current Session:  Interim Events/Subjective Report/Content of Current Session:   Patient checked in 11 minutes late to her 30 minute appointment.     Reports she is "good" today. Reports that mood has been pretty good and anxiety has been well controlled on the "low end of average".   Reports that she is continuing to take all of her medications Prozac 20 mg PO daily, Wellbutrin 150 mg PO daily, and rarely PRN xanax for panic attacks. Reports tolerating these medications well with no reported side effects.     Was evaluated by PCP for fatigue and started on B12 and iron after workup. States she has not noticed any significant effect on energy level but has only been taking for ~1 month. Denies anhedonia. Continues to report sleeping well but continues to sleep 8-9 hours at night and nap during the day. Denies impairment in concentration or appetite. Denies significant feelings of guilt or hopelessness. Denies any suicidal ideation/homicidal ideation and further denies any plan or intention for self harm or harm to to others. Denies AVH.  No panic attacks since last visit and has not used xanax in that time frame either. Works as a seventh/  - work going well and excited to go back after break.     Review of Systems   · PSYCHIATRIC: Pertinant items are noted in the narrative.  · CONSTITUTIONAL: Denies fevers   · RESPIRATORY: Denies SOB, cough  · CARDIOVASCULAR: No tachycardia or chest pain.  · GASTROINTESTINAL: No nausea, vomiting, pain, constipation or diarrhea.    Past Medical, Family and Social History: The patient's past medical, family and social history have been reviewed and updated as " appropriate within the electronic medical record - see encounter notes.    Compliance: yes    Side effects: None - previously with sexual side effects with Prozac but resolved with addition of wellbutrin     Risk Parameters:  Patient reports no suicidal ideation  Patient reports no homicidal ideation  Patient reports no self-injurious behavior  Patient reports no violent behavior    Exam (detailed: at least 9 elements; comprehensive: all 15 elements)   Constitutional  Vitals:  Most recent vital signs, dated less than 90 days prior to this appointment, were reviewed.   Vitals:    01/02/20 0949   BP: 112/73   Pulse: 82   Weight: 62.1 kg (136 lb 14.5 oz)        General:  unremarkable, age appropriate     Musculoskeletal  Muscle Strength/Tone:  not examined   Gait & Station:  non-ataxic      Psychiatric  Speech:  no latency; no press   Mood & Affect:  good  congruent and appropriate, constricted but reactive   Thought Process:  normal and logical   Associations:  intact   Thought Content:  normal, no suicidality, no homicidality, delusions, or paranoia   Insight:  intact   Judgement: behavior is adequate to circumstances   Orientation:  grossly intact   Memory: intact for content of interview   Language: grossly intact   Attention Span & Concentration:  able to focus   Fund of Knowledge:  intact and appropriate to age and level of education     Assessment and Diagnosis   Status/Progress: Based on the examination today, the patient's problem(s) is/are well controlled.  New problems have not been presented today.   Co-morbidities, Diagnostic uncertainty and Lack of compliance are not complicating management of the primary condition.      General Impression:   Unspecified depression, in remission   Unspecified anxiety d/o       ICD-10-CM ICD-9-CM   1. Anxiety F41.9 300.00       Intervention/Counseling/Treatment Plan      Treatment Plan/Recommendations:   - Continue Prozac 20 mg daily   - Continue Wellbutrin  mg  daily  - Continue Xanax 0.5 mg PO daily PRN for panic attacks.  reviewed today. No refills provided today.   - Encouraged regular aerobic exercise for potential benefits to mood and genral health   - Continue w/u with PCP for fatigue     Medication Management: Discussed with patient informed consent including diagnosis, risks and benefits of proposed treatment above vs. alternative treatments vs. no treatment, as well as serious and common side effects of these treatments, and the inherent unpredictability of individual responses to these treatments. The patient expresses understanding of the above and displays the capacity to agree with this current plan. Patient also agrees that, currently, the benefits outweigh the risks and would like to pursue treatment at this time, and had no other questions.    Patient instructions:   · Keep future appointments and take medications as prescribed.   · Abstain from substance use.   · In the event of an emergency, including suicidal ideation, patient was advised to go to the nearest emergency room or call 911.  · Contact with questions. Patient aware that office/EMR communications are for non-emergent issues only and may take up to several days for response     Return to clinic: 4-6 months or sooner PRN    Case to be discussed with psychiatry attending, Dr. Ana Norwood MD   [facility] Psychiatry, PGY-3  1/2/2020

## 2020-01-13 ENCOUNTER — TELEPHONE (OUTPATIENT)
Dept: INTERNAL MEDICINE | Facility: CLINIC | Age: 30
End: 2020-01-13

## 2020-01-13 ENCOUNTER — PATIENT MESSAGE (OUTPATIENT)
Dept: INTERNAL MEDICINE | Facility: CLINIC | Age: 30
End: 2020-01-13

## 2020-01-13 NOTE — TELEPHONE ENCOUNTER
----- Message from Dawn Mortensen sent at 1/13/2020  4:38 PM CST -----  Appointment Request From: Linda Rich    With Provider: Debbie Syed MD [[facility] - Internal Medicine]    Preferred Date Range: 3/9/2020 - 3/13/2020    Preferred Times: Monday Afternoon, Tuesday Afternoon, Wednesday Afternoon, Thursday Afternoon    Reason for visit: Existing Patient    Comments:  Follow-up to blood work     Call back on the portal

## 2020-01-21 ENCOUNTER — PATIENT MESSAGE (OUTPATIENT)
Dept: INTERNAL MEDICINE | Facility: CLINIC | Age: 30
End: 2020-01-21

## 2020-01-23 ENCOUNTER — OFFICE VISIT (OUTPATIENT)
Dept: OTOLARYNGOLOGY | Facility: CLINIC | Age: 30
End: 2020-01-23
Payer: COMMERCIAL

## 2020-01-23 VITALS
SYSTOLIC BLOOD PRESSURE: 126 MMHG | DIASTOLIC BLOOD PRESSURE: 86 MMHG | HEART RATE: 75 BPM | WEIGHT: 132.69 LBS | TEMPERATURE: 98 F | BODY MASS INDEX: 25.92 KG/M2

## 2020-01-23 DIAGNOSIS — R79.89 ELEVATED TSH: Primary | ICD-10-CM

## 2020-01-23 PROCEDURE — 99999 PR PBB SHADOW E&M-EST. PATIENT-LVL III: CPT | Mod: PBBFAC,,, | Performed by: OTOLARYNGOLOGY

## 2020-01-23 PROCEDURE — 99999 PR PBB SHADOW E&M-EST. PATIENT-LVL III: ICD-10-PCS | Mod: PBBFAC,,, | Performed by: OTOLARYNGOLOGY

## 2020-01-23 PROCEDURE — 99203 PR OFFICE/OUTPT VISIT, NEW, LEVL III, 30-44 MIN: ICD-10-PCS | Mod: S$GLB,,, | Performed by: OTOLARYNGOLOGY

## 2020-01-23 PROCEDURE — 3008F BODY MASS INDEX DOCD: CPT | Mod: CPTII,S$GLB,, | Performed by: OTOLARYNGOLOGY

## 2020-01-23 PROCEDURE — 3008F PR BODY MASS INDEX (BMI) DOCUMENTED: ICD-10-PCS | Mod: CPTII,S$GLB,, | Performed by: OTOLARYNGOLOGY

## 2020-01-23 PROCEDURE — 99203 OFFICE O/P NEW LOW 30 MIN: CPT | Mod: S$GLB,,, | Performed by: OTOLARYNGOLOGY

## 2020-01-26 PROBLEM — R79.89 ELEVATED TSH: Status: ACTIVE | Noted: 2020-01-26

## 2020-01-26 NOTE — ASSESSMENT & PLAN NOTE
With family history of Hashimoto's.  I ordered a thyroid ultrasound in a thyroid peroxidase antibody to assess the possibility of Hashimoto's her in greater detail.  I will contact her with the results of the studies.

## 2020-01-26 NOTE — PROGRESS NOTES
Chief Complaint   Patient presents with    consult/need thyroid checked       HPI   29 y.o. female presents with for evaluation of her thyroid.  She recently underwent laboratory studies which revealed a mildly elevated TSH.  She reports some recent symptoms of hypothyroidism including fatigue and constipation.  She has a family history of Hashimoto's thyroiditis.    Review of Systems   Constitutional: Negative for fatigue and unexpected weight change.   HENT: Per HPI.  Eyes: Negative for visual disturbance.   Respiratory: Negative for shortness of breath, hemoptysis   Cardiovascular: Negative for chest pain and palpitations.   Musculoskeletal: Negative for decreased ROM, back pain.   Skin: Negative for rash, sunburn, itching.   Neurological: Negative for dizziness and seizures.   Hematological: Negative for adenopathy. Does not bruise/bleed easily.   Endocrine: Negative for rapid weight loss/weight gain, heat/cold intolerance.     Past Medical History   Patient Active Problem List   Diagnosis    Panic disorder without agoraphobia with mild panic attacks    Fatigue    Weight gain    Headache    Anxiety    Depression           Past Surgical History   History reviewed. No pertinent surgical history.      Family History   Family History   Problem Relation Age of Onset    Hypertension Mother     Hypertension Father            Social History   .  Social History     Socioeconomic History    Marital status:      Spouse name: Not on file    Number of children: Not on file    Years of education: Not on file    Highest education level: Not on file   Occupational History    Not on file   Social Needs    Financial resource strain: Not on file    Food insecurity:     Worry: Not on file     Inability: Not on file    Transportation needs:     Medical: Not on file     Non-medical: Not on file   Tobacco Use    Smoking status: Never Smoker    Smokeless tobacco: Never Used   Substance and Sexual Activity     Alcohol use: Yes     Alcohol/week: 2.0 standard drinks     Types: 2 Glasses of wine per week     Comment: monthly    Drug use: No    Sexual activity: Yes     Partners: Male     Birth control/protection: Pill     Comment: with    Lifestyle    Physical activity:     Days per week: Not on file     Minutes per session: Not on file    Stress: Not on file   Relationships    Social connections:     Talks on phone: Not on file     Gets together: Not on file     Attends Shinto service: Not on file     Active member of club or organization: Not on file     Attends meetings of clubs or organizations: Not on file     Relationship status: Not on file   Other Topics Concern    Patient feels they ought to cut down on drinking/drug use Not Asked    Patient annoyed by others criticizing their drinking/drug use Not Asked    Patient has felt bad or guilty about drinking/drug use Not Asked    Patient has had a drink/used drugs as an eye opener in the AM Not Asked   Social History Narrative    Not on file         Allergies   Review of patient's allergies indicates:   Allergen Reactions    Cefdinir Diarrhea           Physical Exam     Vitals:    01/23/20 1635   BP: 126/86   Pulse: 75   Temp: 98.3 °F (36.8 °C)         Body mass index is 25.92 kg/m².      General: AOx3, NAD   Respiratory:  Symmetric chest rise, normal effort  Nose: No gross nasal septal deviation. Inferior Turbinates WNL bilaterally. No septal perforation. No masses/lesions.   Oral Cavity:  Oral Tongue mobile, no lesions noted. Hard Palate WNL. No buccal or FOM lesions.  Oropharynx:  No masses/lesions of the posterior pharyngeal wall. Tonsillar fossa without lesions. Soft palate without masses. Midline uvula.   Neck: No scars.  No cervical lymphadenopathy, thyromegaly or thyroid nodules.  Normal range of motion.    Face: House Brackmann I bilaterally.     TSH - 4.77.  Assessment/Plan  Problem List Items Addressed This Visit        Endocrine    Elevated  TSH - Primary     With family history of Hashimoto's.  I ordered a thyroid ultrasound in a thyroid peroxidase antibody to assess the possibility of Hashimoto's her in greater detail.  I will contact her with the results of the studies.         Relevant Orders    US Soft Tissue Head Neck Thyroid    THYROID PEROXIDASE ANTIBODY

## 2020-02-05 ENCOUNTER — PATIENT MESSAGE (OUTPATIENT)
Dept: INTERNAL MEDICINE | Facility: CLINIC | Age: 30
End: 2020-02-05

## 2020-02-06 ENCOUNTER — PATIENT MESSAGE (OUTPATIENT)
Dept: INTERNAL MEDICINE | Facility: CLINIC | Age: 30
End: 2020-02-06

## 2020-02-06 ENCOUNTER — PATIENT MESSAGE (OUTPATIENT)
Dept: OTOLARYNGOLOGY | Facility: CLINIC | Age: 30
End: 2020-02-06

## 2020-02-07 ENCOUNTER — CLINICAL SUPPORT (OUTPATIENT)
Dept: INTERNAL MEDICINE | Facility: CLINIC | Age: 30
End: 2020-02-07
Payer: COMMERCIAL

## 2020-02-07 DIAGNOSIS — R53.82 CHRONIC FATIGUE: Primary | ICD-10-CM

## 2020-02-07 PROCEDURE — 96372 THER/PROPH/DIAG INJ SC/IM: CPT | Mod: S$GLB,,, | Performed by: STUDENT IN AN ORGANIZED HEALTH CARE EDUCATION/TRAINING PROGRAM

## 2020-02-07 PROCEDURE — 96372 PR INJECTION,THERAP/PROPH/DIAG2ST, IM OR SUBCUT: ICD-10-PCS | Mod: S$GLB,,, | Performed by: STUDENT IN AN ORGANIZED HEALTH CARE EDUCATION/TRAINING PROGRAM

## 2020-02-07 RX ORDER — CYANOCOBALAMIN 1000 UG/ML
1000 INJECTION, SOLUTION INTRAMUSCULAR; SUBCUTANEOUS
Status: COMPLETED | OUTPATIENT
Start: 2020-02-07 | End: 2020-02-07

## 2020-02-07 RX ADMIN — CYANOCOBALAMIN 1000 MCG: 1000 INJECTION, SOLUTION INTRAMUSCULAR; SUBCUTANEOUS at 04:02

## 2020-02-10 ENCOUNTER — PATIENT MESSAGE (OUTPATIENT)
Dept: INTERNAL MEDICINE | Facility: CLINIC | Age: 30
End: 2020-02-10

## 2020-02-10 ENCOUNTER — PATIENT MESSAGE (OUTPATIENT)
Dept: OTOLARYNGOLOGY | Facility: CLINIC | Age: 30
End: 2020-02-10

## 2020-02-10 DIAGNOSIS — E61.1 IRON DEFICIENCY: Primary | ICD-10-CM

## 2020-02-10 LAB — THYROPEROXIDASE AB SERPL-ACNC: <1 IU/ML

## 2020-02-10 RX ORDER — CYANOCOBALAMIN 1000 UG/ML
1000 INJECTION, SOLUTION INTRAMUSCULAR; SUBCUTANEOUS
Status: SHIPPED | OUTPATIENT
Start: 2020-02-11 | End: 2020-06-10

## 2020-02-10 NOTE — TELEPHONE ENCOUNTER
Have sent 4 doses of B12 solution to patient's pharmacy. Instructed her to do one 2 weeks from last injection and next 3 monthly. Instructed her to  25 gauge syringes (similar to those used for the flu shot) at her pharmacy as these are over the counter and do not require prescription. Told the patient to follow up with me in 3 months.

## 2020-02-12 ENCOUNTER — PATIENT MESSAGE (OUTPATIENT)
Dept: INTERNAL MEDICINE | Facility: CLINIC | Age: 30
End: 2020-02-12

## 2020-02-12 ENCOUNTER — HOSPITAL ENCOUNTER (OUTPATIENT)
Dept: RADIOLOGY | Facility: HOSPITAL | Age: 30
Discharge: HOME | End: 2020-02-12
Attending: OTOLARYNGOLOGY
Payer: COMMERCIAL

## 2020-02-12 DIAGNOSIS — R79.89 ELEVATED TSH: ICD-10-CM

## 2020-02-12 DIAGNOSIS — R53.82 CHRONIC FATIGUE: Primary | ICD-10-CM

## 2020-02-12 PROCEDURE — 76536 US SOFT TISSUE HEAD NECK THYROID: ICD-10-PCS | Mod: 26,,, | Performed by: RADIOLOGY

## 2020-02-12 PROCEDURE — 76536 US EXAM OF HEAD AND NECK: CPT | Mod: TC

## 2020-02-12 PROCEDURE — 76536 US EXAM OF HEAD AND NECK: CPT | Mod: 26,,, | Performed by: RADIOLOGY

## 2020-02-12 RX ORDER — CYANOCOBALAMIN 1000 UG/ML
1000 INJECTION, SOLUTION INTRAMUSCULAR; SUBCUTANEOUS
Qty: 4 ML | Refills: 0 | Status: SHIPPED | OUTPATIENT
Start: 2020-02-12 | End: 2020-05-28 | Stop reason: SDUPTHER

## 2020-02-12 NOTE — TELEPHONE ENCOUNTER
Have printed prescriptions for B12 and iron and have left them with Ms. Chavez in clinic. Have contacted the patient on [employer] and notified her that these prescriptions are ready for .

## 2020-02-13 ENCOUNTER — PATIENT MESSAGE (OUTPATIENT)
Dept: OTOLARYNGOLOGY | Facility: CLINIC | Age: 30
End: 2020-02-13

## 2020-05-16 ENCOUNTER — PATIENT MESSAGE (OUTPATIENT)
Dept: INTERNAL MEDICINE | Facility: CLINIC | Age: 30
End: 2020-05-16

## 2020-05-25 ENCOUNTER — PATIENT MESSAGE (OUTPATIENT)
Dept: PSYCHIATRY | Facility: CLINIC | Age: 30
End: 2020-05-25

## 2020-05-28 ENCOUNTER — OFFICE VISIT (OUTPATIENT)
Dept: INTERNAL MEDICINE | Facility: CLINIC | Age: 30
End: 2020-05-28
Payer: COMMERCIAL

## 2020-05-28 VITALS — HEART RATE: 72 BPM | SYSTOLIC BLOOD PRESSURE: 110 MMHG | DIASTOLIC BLOOD PRESSURE: 60 MMHG

## 2020-05-28 DIAGNOSIS — R79.89 ELEVATED TSH: ICD-10-CM

## 2020-05-28 DIAGNOSIS — E53.8 B12 DEFICIENCY: ICD-10-CM

## 2020-05-28 DIAGNOSIS — E61.1 IRON DEFICIENCY: ICD-10-CM

## 2020-05-28 DIAGNOSIS — R53.82 CHRONIC FATIGUE: Primary | ICD-10-CM

## 2020-05-28 DIAGNOSIS — F32.A DEPRESSION, UNSPECIFIED DEPRESSION TYPE: ICD-10-CM

## 2020-05-28 DIAGNOSIS — F41.9 ANXIETY: ICD-10-CM

## 2020-05-28 PROCEDURE — 99999 PR PBB SHADOW E&M-EST. PATIENT-LVL III: CPT | Mod: PBBFAC,,, | Performed by: STUDENT IN AN ORGANIZED HEALTH CARE EDUCATION/TRAINING PROGRAM

## 2020-05-28 PROCEDURE — 99213 OFFICE O/P EST LOW 20 MIN: CPT | Mod: S$GLB,,, | Performed by: STUDENT IN AN ORGANIZED HEALTH CARE EDUCATION/TRAINING PROGRAM

## 2020-05-28 PROCEDURE — 99999 PR PBB SHADOW E&M-EST. PATIENT-LVL III: ICD-10-PCS | Mod: PBBFAC,,, | Performed by: STUDENT IN AN ORGANIZED HEALTH CARE EDUCATION/TRAINING PROGRAM

## 2020-05-28 PROCEDURE — 99213 PR OFFICE/OUTPT VISIT, EST, LEVL III, 20-29 MIN: ICD-10-PCS | Mod: S$GLB,,, | Performed by: STUDENT IN AN ORGANIZED HEALTH CARE EDUCATION/TRAINING PROGRAM

## 2020-05-28 RX ORDER — CYANOCOBALAMIN 1000 UG/ML
1000 INJECTION, SOLUTION INTRAMUSCULAR; SUBCUTANEOUS
Qty: 4 ML | Refills: 0 | Status: SHIPPED | OUTPATIENT
Start: 2020-05-28 | End: 2020-08-27

## 2020-05-28 NOTE — ASSESSMENT & PLAN NOTE
Patient's b12 on last labs was 217.  - Patient has been receiving B12 injections monthly.    Plan  - continue B12 injections.  - B12 lab ordered

## 2020-05-28 NOTE — PATIENT INSTRUCTIONS
1. Get labs  2.  B12  3. Keep sleep diary from [employer] [facility]  4. Contact me if you would like sleep study  5. Consider bringing sleep diary to Psychiatrist and discussing medications

## 2020-05-28 NOTE — ASSESSMENT & PLAN NOTE
Patient takes wellbutrin and prozac with xanax PRN per Psychiatry for anxiety and depression.  - She feels comfortable with this care.    Plan  - continue to follow up with Psychiatry.

## 2020-05-28 NOTE — PROGRESS NOTES
Clinic Note  5/28/2020      Subjective:       Patient ID:  Linda is a 29 y.o. female being seen to establish care.     Chief Complaint: No chief complaint on file.       Patient is a 30 yo female here for follow up. Patient was last seen in December in which she reported excessive fatigue. She got labs done that revealed possible hypothyroidism (thyroid peroxidase negative) and borderline low B12. Patient started taking B12 shot and iron.     Per Psychiatry, patient is on fluoxetine, wellbutrin, and xanax PRN. She has been diagnosed with Depression and Generalized Anxiety. She feels like these are working and helping her management. Her Psychiatrist suggested she start adding light exercise which she can tolerate.     Patient reports that she does not snore and asked her . Patient falls asleep easily and does not have issues sleeping. At times, patient feels she could even fall asleep at her desk. She states with any exertion she needs a nap. Patient still takes 1 nap/day and can be as long as 2-2.5 hours if no one is home.     Patient is concerned that she wants to be able to eventually start a family in years but wants to make sure she is awake enough to do these things.     Patient also has a history of chronic headaches. Patient reports she has been more hydrated and less stressed by work due to COVID and they have been managable. Patient is not concerned about these today.      Past Medical History:   Diagnosis Date    Anxiety     Depression     Hx of psychiatric care     Psychiatric problem     Therapy        No past surgical history on file.    Family History   Problem Relation Age of Onset    Hypertension Mother     Hypertension Father        Social History     Socioeconomic History    Marital status:      Spouse name: Not on file    Number of children: Not on file    Years of education: Not on file    Highest education level: Not on file   Occupational History    Not on file    Social Needs    Financial resource strain: Not on file    Food insecurity:     Worry: Not on file     Inability: Not on file    Transportation needs:     Medical: Not on file     Non-medical: Not on file   Tobacco Use    Smoking status: Never Smoker    Smokeless tobacco: Never Used   Substance and Sexual Activity    Alcohol use: Yes     Alcohol/week: 2.0 standard drinks     Types: 2 Glasses of wine per week     Comment: monthly    Drug use: No    Sexual activity: Yes     Partners: Male     Birth control/protection: Pill     Comment: with    Lifestyle    Physical activity:     Days per week: Not on file     Minutes per session: Not on file    Stress: Not on file   Relationships    Social connections:     Talks on phone: Not on file     Gets together: Not on file     Attends Rastafarian service: Not on file     Active member of club or organization: Not on file     Attends meetings of clubs or organizations: Not on file     Relationship status: Not on file   Other Topics Concern    Patient feels they ought to cut down on drinking/drug use Not Asked    Patient annoyed by others criticizing their drinking/drug use Not Asked    Patient has felt bad or guilty about drinking/drug use Not Asked    Patient has had a drink/used drugs as an eye opener in the AM Not Asked   Social History Narrative    Not on file         Review of Systems   Constitutional: Positive for malaise/fatigue. Negative for chills, diaphoresis, fever and weight loss.   HENT: Negative for congestion and sinus pain.    Eyes: Negative for blurred vision, double vision and photophobia.   Respiratory: Negative for cough and shortness of breath.    Cardiovascular: Negative for chest pain, claudication and leg swelling.   Gastrointestinal: Negative for constipation, diarrhea, nausea and vomiting.   Genitourinary: Negative for dysuria and urgency.   Musculoskeletal: Negative for back pain and neck pain.   Neurological: Negative for  dizziness and headaches.   Psychiatric/Behavioral: Positive for depression. Negative for substance abuse. The patient is nervous/anxious. The patient does not have insomnia.        Medication List with Changes/Refills   Current Medications    ALPRAZOLAM (XANAX) 0.5 MG TABLET    Take 1 tablet (0.5 mg total) by mouth daily as needed for Anxiety.    BUPROPION (WELLBUTRIN XL) 150 MG TB24 TABLET    Take 1 tablet (150 mg total) by mouth once daily.    FERROUS SULFATE, DRIED (SLOW FE) 160 MG (50 MG IRON) TBSR    Take 1 tablet (160 mg total) by mouth once daily.    FLUOXETINE 20 MG CAPSULE    Take 1 capsule (20 mg total) by mouth once daily.    IBUPROFEN (ADVIL,MOTRIN) 600 MG TABLET    Take 1 tablet (600 mg total) by mouth daily as needed for Pain (headache).    NORGESTIMATE-ETHINYL ESTRADIOL (ORTHO TRI-CYCLEN LO) 0.18/0.215/0.25 MG-25 MCG TABLET    Take 1 tablet by mouth once daily.   Changed and/or Refilled Medications    Modified Medication Previous Medication    CYANOCOBALAMIN 1,000 MCG/ML INJECTION cyanocobalamin 1,000 mcg/mL injection       Inject 1 mL (1,000 mcg total) into the muscle every 30 days. for 4 doses    Inject 1 mL (1,000 mcg total) into the muscle every 30 days. for 4 doses   Discontinued Medications    HYDROXYZINE PAMOATE (VISTARIL) 25 MG CAP    Take 1 capsule (25 mg total) by mouth every 6 (six) hours as needed (anxiety or insomnia).       Patient Active Problem List   Diagnosis    Panic disorder without agoraphobia with mild panic attacks    Chronic fatigue    Weight gain    Headache    Anxiety    Depression    Elevated TSH    Iron deficiency    B12 deficiency               Objective:      /60   Pulse 72   Estimated body mass index is 25.92 kg/m² as calculated from the following:    Height as of 11/26/19: 5' (1.524 m).    Weight as of 1/23/20: 60.2 kg (132 lb 11.5 oz).  Physical Exam   Constitutional: She is oriented to person, place, and time and well-developed, well-nourished, and in  no distress. No distress.   Eyes: Pupils are equal, round, and reactive to light. Conjunctivae and EOM are normal.   Neck: Normal range of motion. Neck supple.   Cardiovascular: Normal rate, regular rhythm and normal heart sounds.   Pulmonary/Chest: Effort normal and breath sounds normal. No respiratory distress. She has no rales.   Abdominal: Soft. Bowel sounds are normal. She exhibits no distension.   Musculoskeletal: Normal range of motion. She exhibits no edema or tenderness.   Neurological: She is alert and oriented to person, place, and time. No cranial nerve deficit.   Skin: Skin is warm and dry. She is not diaphoretic.   Psychiatric: Memory, affect and judgment normal.   Nursing note and vitals reviewed.        Assessment and Plan:         Problem List Items Addressed This Visit        Psychiatric    Anxiety    Current Assessment & Plan     Patient takes wellbutrin and prozac with xanax PRN per Psychiatry for anxiety and depression.  - She feels comfortable with this care.    Plan  - continue to follow up with Psychiatry.         Depression    Current Assessment & Plan     Patient takes wellbutrin and prozac with xanax PRN per Psychiatry for anxiety and depression.  - She feels comfortable with this care.    Plan  - continue to follow up with Psychiatry.            Endocrine    Elevated TSH    Current Assessment & Plan     Patient had an elevated TSH with negative thyroxidase antibody.    Plan  - in setting of fatigue, repeat TSH         Iron deficiency    Current Assessment & Plan     Patient has hx of iron deficiency and takes iron daily.    Plan  - continue taking iron supplements         Relevant Orders    Iron    Vitamin B12    B12 deficiency    Current Assessment & Plan     Patient's b12 on last labs was 217.  - Patient has been receiving B12 injections monthly.    Plan  - continue B12 injections.  - B12 lab ordered            Other    Chronic fatigue - Primary    Current Assessment & Plan     Patient  has had hx of chronic fatigue requiring a nap every day. She feels it is debilitating to her lifestyle.  - all labs were WNL (low range but normal).    Plan  - continue iron and B12 supplements  - CBC, CMP, iron, B12, TSH ordered  - Patient to keep a sleep diary daily and see if there is a common finding as to why patient is so fatigued. Consider discussing this with Psychiatry as well.   - consider sleep study         Relevant Medications    cyanocobalamin 1,000 mcg/mL injection    Other Relevant Orders    CBC auto differential    Comprehensive metabolic panel    Vitamin B12    TSH          Follow Up:       Diagnoses and all orders for this visit:    Chronic fatigue  -     Cancel: TSH; Future  -     Cancel: CBC auto differential; Future  -     Cancel: Comprehensive metabolic panel; Future  -     cyanocobalamin 1,000 mcg/mL injection; Inject 1 mL (1,000 mcg total) into the muscle every 30 days. for 4 doses  -     Cancel: Vitamin B12; Future  -     CBC auto differential; Future  -     Comprehensive metabolic panel; Future  -     Vitamin B12; Future  -     TSH; Future  -     CBC auto differential  -     Comprehensive metabolic panel  -     Vitamin B12  -     TSH    Iron deficiency  -     Cancel: Iron and TIBC; Future  -     Iron; Future  -     Vitamin B12; Future  -     Iron  -     Vitamin B12    Anxiety    Depression, unspecified depression type    Elevated TSH    B12 deficiency            Other Orders Placed This Visit:  Orders Placed This Encounter   Procedures    CBC auto differential    Comprehensive metabolic panel    Iron    Vitamin B12    TSH             Interview, Assessment, Findings, and Plan discussed with Dr. Galicia.    Follow up in about 6 months (around 11/28/2020).    Debbie Syed MD  Internal Medicine   [phone]

## 2020-05-28 NOTE — ASSESSMENT & PLAN NOTE
Patient had an elevated TSH with negative thyroxidase antibody.    Plan  - in setting of fatigue, repeat TSH

## 2020-05-28 NOTE — ASSESSMENT & PLAN NOTE
Patient has hx of iron deficiency and takes iron daily.    Plan  - continue taking iron supplements

## 2020-05-28 NOTE — ASSESSMENT & PLAN NOTE
Patient has had hx of chronic fatigue requiring a nap every day. She feels it is debilitating to her lifestyle.  - all labs were WNL (low range but normal).    Plan  - continue iron and B12 supplements  - CBC, CMP, iron, B12, TSH ordered  - Patient to keep a sleep diary daily and see if there is a common finding as to why patient is so fatigued. Consider discussing this with Psychiatry as well.   - consider sleep study

## 2020-05-29 ENCOUNTER — OFFICE VISIT (OUTPATIENT)
Dept: PSYCHIATRY | Facility: CLINIC | Age: 30
End: 2020-05-29
Payer: COMMERCIAL

## 2020-05-29 DIAGNOSIS — F41.9 ANXIETY: Primary | ICD-10-CM

## 2020-05-29 DIAGNOSIS — F32.A DEPRESSION, UNSPECIFIED DEPRESSION TYPE: ICD-10-CM

## 2020-05-29 PROCEDURE — 99213 PR OFFICE/OUTPT VISIT, EST, LEVL III, 20-29 MIN: ICD-10-PCS | Mod: 95,,, | Performed by: PSYCHIATRY & NEUROLOGY

## 2020-05-29 PROCEDURE — 99213 OFFICE O/P EST LOW 20 MIN: CPT | Mod: 95,,, | Performed by: PSYCHIATRY & NEUROLOGY

## 2020-05-29 RX ORDER — ALPRAZOLAM 0.5 MG/1
0.5 TABLET ORAL DAILY PRN
Qty: 15 TABLET | Refills: 1 | Status: SHIPPED | OUTPATIENT
Start: 2020-05-29 | End: 2020-05-29

## 2020-05-29 RX ORDER — BUPROPION HYDROCHLORIDE 150 MG/1
150 TABLET ORAL DAILY
Qty: 30 TABLET | Refills: 5 | Status: SHIPPED | OUTPATIENT
Start: 2020-05-29 | End: 2020-07-20 | Stop reason: SDUPTHER

## 2020-05-29 RX ORDER — ALPRAZOLAM 0.5 MG/1
0.5 TABLET ORAL DAILY PRN
Qty: 15 TABLET | Refills: 1 | Status: SHIPPED | OUTPATIENT
Start: 2020-05-29 | End: 2020-07-20 | Stop reason: SDUPTHER

## 2020-05-29 RX ORDER — FLUOXETINE HYDROCHLORIDE 20 MG/1
20 CAPSULE ORAL DAILY
Qty: 30 CAPSULE | Refills: 5 | Status: SHIPPED | OUTPATIENT
Start: 2020-05-29 | End: 2020-07-20 | Stop reason: SDUPTHER

## 2020-05-29 NOTE — PROGRESS NOTES
"Outpatient Psychiatry Telehealth Follow-Up Visit (MD/NP)    5/29/2020    Clinical Status of Patient:  Outpatient (Ambulatory)    Chief Complaint:  Linda Rich is a 29 y.o. female who presents today for follow-up of depression and anxiety.  Chart reviewed. Met with patient virtually.      TELE PSYCHIATRY   Disclaimer   *The patient was informed despite using HIPPA compliant technology there may be risks including security breach, technological failure, inability to perform a comprehensive physical exam which could delay or prevent an accurate diagnosis, and potential complications from treatment decisions rendered over a telemedical platform. The patient understands and consented to the use of tele-health service as being a safe measure to mitigate during COVID Crisis.  The patient was also informed of the relationship between the physician and patient and the respective role of any other health care provider with respect to management of the patient; and notified that the pt may decline to receive medical services by telemedicine and may withdraw from such care at any time.     Patient's Current location, exact physical address: [address] [place] [address]  In Case of Emergency pts next of kin  Name:Kahlil Walker  Phone number: [phone]  Visit type: Virtual visit with synchronous audio and video  Total time spent with patient: 25 minutes     Interval History and Content of Current Session:  Interim Events/Subjective Report/Content of Current Session:   Reports she is "good" today and that mood has been good lately. Does report that she has had some increase in anxiety 2/2 COVID-19 stressors. Has had a lot of time on her hands and often feels more anxious when not occupied. Has been trying to stay busy with tasks around house, dog, visiting family etc... Does relate that she and her  just moved and is happy this was completed.   Reports that she is continuing to take all of her " "medications Prozac 20 mg PO daily, Wellbutrin 150 mg PO daily, and rarely PRN xanax for panic attacks as directed. Reports tolerating these medications well with no reported side effects. Says she does not want to alter her medication at this time and feels it is working well for her.    Followed up yesterday PCP for fatigue. Repeat labs ordered and sleep study was offered. Patient continues to take B12 and iron supplements. No thyroid issues detected. Relates that she has always been a "van" even in highschool. Is generally very tired during work even at home when she is not doing much. Doesn't fall asleep when not intending to. Says she can go without napping but does not feel good. No hx of snoring. No hx of cataplexy. Has been trying to stay active by running errands and has been walking dog prety much every day. Is able to complete all necessary tasks. Discussed that sleep study may be informative.     Denies anhedonia. Continues to report sleeping well but continues to sleep 8-9 hours at night w/ nap during the day. Denies impairment in concentration or appetite. Denies significant feelings of guilt or hopelessness. Denies any suicidal ideation/homicidal ideation and further denies any plan or intention for self harm or harm to to others. Denies AVH.  Has had ~ 2-3 panic attacks since last visit. Still a lot of uncertainty about what her job will look like next year.     Review of Systems   · PSYCHIATRIC: Pertinant items are noted in the narrative.  · CONSTITUTIONAL: Denies fevers/ chills, denies significant weight change   · ENT: Denies sore throat  · RESPIRATORY: Denies SOB, cough  · CARDIOVASCULAR: No tachycardia or chest pain.  · GASTROINTESTINAL: Some nausea assc w/ anxiety. Denies vomiting/constipation/diarrhea     Past Medical, Family and Social History: The patient's past medical, family and social history have been reviewed and updated as appropriate within the electronic medical record - see " "encounter notes.    Compliance: yes    Side effects: None     Risk Parameters:  Patient reports no suicidal ideation  Patient reports no homicidal ideation  Patient reports no self-injurious behavior  Patient reports no violent behavior    Exam (detailed: at least 9 elements; comprehensive: all 15 elements)   Constitutional  Vitals:  Most recent vital signs, dated less than 90 days prior to this appointment, were reviewed.   There were no vitals filed for this visit.     General:  unremarkable, age appropriate     Musculoskeletal  Muscle Strength/Tone:  Unable to assess    Gait & Station:  Unable to assess       Psychiatric  Speech:  no latency; no press   Mood & Affect:  "good"   congruent and appropriate, constricted but reactive   Thought Process:  normal and logical   Associations:  intact   Thought Content:  normal, no suicidality, no homicidality, delusions, or paranoia   Insight:  intact   Judgement: behavior is adequate to circumstances   Orientation:  grossly intact   Memory: intact for content of interview   Language: grossly intact   Attention Span & Concentration:  able to focus   Fund of Knowledge:  intact and appropriate to age and level of education     Assessment and Diagnosis   Status/Progress: Based on the examination today, the patient's problem(s) is/are well controlled.  New problems have not been presented today.   Diagnostic uncertainty and Lack of compliance are not complicating management of the primary condition.      General Impression:   Unspecified depression, in remission   Unspecified anxiety d/o     No diagnosis found.    Intervention/Counseling/Treatment Plan      Treatment Plan/Recommendations:   - Continue Prozac 20 mg daily   - Continue Wellbutrin  mg daily  - Continue Xanax 0.5 mg PO daily PRN for panic attacks.  reviewed today. Last fill 3/13/20. 2 refills sent today.   - Encouraged regular aerobic exercise for potential benefits to mood and genral health   - Continue " w/u with PCP for fatigue   - Discussed resident transition     Medication Management: Discussed with patient informed consent including diagnosis, risks and benefits of proposed treatment above vs. alternative treatments vs. no treatment, as well as serious and common side effects of these treatments, and the inherent unpredictability of individual responses to these treatments. The patient expresses understanding of the above and displays the capacity to agree with this current plan. Patient also agrees that, currently, the benefits outweigh the risks and would like to pursue treatment at this time, and had no other questions.    Patient instructions:   · Keep future appointments and take medications as prescribed.   · Abstain from substance use.   · In the event of an emergency, including suicidal ideation, patient was advised to go to the nearest emergency room or call 911.  · Contact with questions. Patient aware that office/EMR communications are for non-emergent issues only and may take up to several days for response     Return to clinic: 4 months or sooner PRN    Case to be discussed with psychiatry attending, Dr. Ana Norwood MD   [facility] Psychiatry, PGY-3  5/29/2020

## 2020-06-19 ENCOUNTER — OFFICE VISIT (OUTPATIENT)
Dept: PSYCHIATRY | Facility: CLINIC | Age: 30
End: 2020-06-19
Payer: COMMERCIAL

## 2020-06-19 DIAGNOSIS — F41.9 ANXIETY: Primary | ICD-10-CM

## 2020-06-19 DIAGNOSIS — F32.A DEPRESSION, UNSPECIFIED DEPRESSION TYPE: ICD-10-CM

## 2020-06-19 PROCEDURE — 99213 OFFICE O/P EST LOW 20 MIN: CPT | Mod: 95,,, | Performed by: PSYCHIATRY & NEUROLOGY

## 2020-06-19 PROCEDURE — 99213 PR OFFICE/OUTPT VISIT, EST, LEVL III, 20-29 MIN: ICD-10-PCS | Mod: 95,,, | Performed by: PSYCHIATRY & NEUROLOGY

## 2020-06-19 NOTE — PROGRESS NOTES
"Outpatient Psychiatry Telehealth Follow-Up Visit (MD/NP)    6/19/2020    Clinical Status of Patient:  Outpatient (Ambulatory)    Chief Complaint:  Linda Rich is a 30 y.o. female who presents today for follow-up of depression and anxiety.  Chart reviewed. Met with patient virtually.      TELE PSYCHIATRY   Disclaimer   *The patient was informed despite using HIPPA compliant technology there may be risks including security breach, technological failure, inability to perform a comprehensive physical exam which could delay or prevent an accurate diagnosis, and potential complications from treatment decisions rendered over a telemedical platform. The patient understands and consented to the use of tele-health service as being a safe measure to mitigate during COVID Crisis.  The patient was also informed of the relationship between the physician and patient and the respective role of any other health care provider with respect to management of the patient; and notified that the pt may decline to receive medical services by telemedicine and may withdraw from such care at any time.     Patient's Current location, exact physical address: [address] [place] [address]  In Case of Emergency pts next of kin  Name:Kahlil Walker  Phone number: [phone]  Visit type: Virtual visit with synchronous audio and video  Total time spent with patient: 25 minutes     Interval History and Content of Current Session:  Interim Events/Subjective Report/Content of Current Session:     Patient last seen 5/29/2020. Patient reports that she was feeling very "anxious for a couple weeks", since her last visit. However notes that for the past two day she have been feeling "very good" and now feels "silly" for making visit. Says that she was having associated physical symptoms of anxiety s.a nausea/ stomach upset and notes physical anxiety sxs are typical for her. Cannot identify any triggers for increased anxiety and notes " "that "nothing different was going on". Again says that being out of work and having "nothing to do" is giving her more time to perseverate/ worry. Says she is trying to keep busy but the longer she is out of work, the more she feels anxious about going back. Says she has been obsessing over things that don't need to be obssessed about and has had frequent episodes of tearfulness.  Also relates she had two  panic attacks over past two weeks. Irritable and not wanting to be around people.     Reports that she is continuing to take all of her medications Prozac 20 mg PO daily, Wellbutrin 150 mg PO daily, and rarely PRN xanax for panic attacks as directed. Reports tolerating these medications well with no reported side effects. Sleep has not changed, still with low energy in afternoon. Emphasizes that past two days have been good. Denies any suicidal ideation/homicidal ideation and further denies any plan or intention for self harm or harm to to others. Denies AVH.      Patient has scheduled a therapy intake and will have her first visit on 7/02. Discussed option of increasing Prozac today vs proceeding with therapy to address anxiety and mood in conjunction with medications before making medication changes. Patient reports she would like to try therapy before changing mediations. Will plan for close follow up.     Review of Systems   · PSYCHIATRIC: Pertinant items are noted in the narrative.  · CONSTITUTIONAL: Denies fevers/ chills, denies significant weight change   · ENT: Denies sore throat  · RESPIRATORY: Denies SOB, cough  · CARDIOVASCULAR: No tachycardia or chest pain.  · GASTROINTESTINAL: Some nausea assc w/ anxiety. Denies vomiting/constipation/diarrhea     Past Medical, Family and Social History: The patient's past medical, family and social history have been reviewed and updated as appropriate within the electronic medical record - see encounter notes.    Compliance: yes    Side effects: None     Risk " Parameters:  Patient reports no suicidal ideation  Patient reports no homicidal ideation  Patient reports no self-injurious behavior  Patient reports no violent behavior    Exam (detailed: at least 9 elements; comprehensive: all 15 elements)   Constitutional  Vitals:  Most recent vital signs, dated less than 90 days prior to this appointment, were reviewed.   There were no vitals filed for this visit.     General:  unremarkable, age appropriate     Musculoskeletal  Muscle Strength/Tone:  Unable to assess    Gait & Station:  Unable to assess       Psychiatric  Speech:  no latency; no press   Mood & Affect:  euthymic   congruent and appropriate, constricted but reactive   Thought Process:  normal and logical   Associations:  intact   Thought Content:  normal, no suicidality, no homicidality, delusions, or paranoia   Insight:  intact   Judgement: behavior is adequate to circumstances   Orientation:  grossly intact   Memory: intact for content of interview   Language: grossly intact   Attention Span & Concentration:  able to focus   Fund of Knowledge:  intact and appropriate to age and level of education     Assessment and Diagnosis   Status/Progress: Based on the examination today, the patient's problem(s) is/are adequately but not ideally controlled.  New problems have not been presented today.   Diagnostic uncertainty and Lack of compliance are not complicating management of the primary condition.      General Impression:   Unspecified depression, in remission   Unspecified anxiety d/o, likely LUTHER with panic attacks     No diagnosis found.    Intervention/Counseling/Treatment Plan      Treatment Plan/Recommendations:   - Continue Prozac 20 mg daily   - Continue Wellbutrin  mg daily  - Continue Xanax 0.5 mg PO daily PRN for panic attacks.  reviewed today. Last fill 3/13/20. 2 refills sent today.   - Patient to start therapy to address exacerbation of anxiety. Will plan for close follow up to reconsider  medication changes.   - Encouraged regular aerobic exercise for potential benefits to mood and genral health   - Continue w/u with PCP for fatigue   - Discussed resident transition     Medication Management: Discussed with patient informed consent including diagnosis, risks and benefits of proposed treatment above vs. alternative treatments vs. no treatment, as well as serious and common side effects of these treatments, and the inherent unpredictability of individual responses to these treatments. The patient expresses understanding of the above and displays the capacity to agree with this current plan. Patient also agrees that, currently, the benefits outweigh the risks and would like to pursue treatment at this time, and had no other questions.    Patient instructions:   · Keep future appointments and take medications as prescribed.   · Abstain from substance use.   · In the event of an emergency, including suicidal ideation, patient was advised to go to the nearest emergency room or call 911.  · Contact with questions. Patient aware that office/EMR communications are for non-emergent issues only and may take up to several days for response     Return to clinic: 4 weeks or sooner PRN    Case to be discussed with psychiatry attending, Dr. Ana Norwood MD   [facility], PGY-3  6/19/2020

## 2020-06-24 ENCOUNTER — TELEPHONE (OUTPATIENT)
Dept: INTERNAL MEDICINE | Facility: CLINIC | Age: 30
End: 2020-06-24

## 2020-06-24 NOTE — TELEPHONE ENCOUNTER
----- Message from Kelsi Wall sent at 6/24/2020 12:52 PM CDT -----  Contact: patient [phone]   entered lab orders for patient on 5/28/20 and she is calling to ask that we send orders to [facility] [facility] [address] [place] [phone] Office Pt did not have the fax number.

## 2020-06-25 DIAGNOSIS — Z71.89 ADVICE GIVEN ABOUT COVID-19 VIRUS INFECTION: Primary | ICD-10-CM

## 2020-07-02 ENCOUNTER — OFFICE VISIT (OUTPATIENT)
Dept: PSYCHIATRY | Facility: CLINIC | Age: 30
End: 2020-07-02
Payer: COMMERCIAL

## 2020-07-02 DIAGNOSIS — F41.0 PANIC DISORDER WITHOUT AGORAPHOBIA WITH MILD PANIC ATTACKS: ICD-10-CM

## 2020-07-02 DIAGNOSIS — F41.9 ANXIETY: Primary | ICD-10-CM

## 2020-07-02 PROCEDURE — 90791 PR PSYCHIATRIC DIAGNOSTIC EVALUATION: ICD-10-PCS | Mod: 95,,,

## 2020-07-02 PROCEDURE — 90785 PSYTX COMPLEX INTERACTIVE: CPT | Mod: 95,,,

## 2020-07-02 PROCEDURE — 90791 PSYCH DIAGNOSTIC EVALUATION: CPT | Mod: 95,,,

## 2020-07-02 PROCEDURE — 90785 PR INTERACTIVE COMPLEXITY: ICD-10-PCS | Mod: 95,,,

## 2020-07-02 NOTE — PROGRESS NOTES
Psychiatry Initial Visit (PhD/LCSW)  Diagnostic Interview - CPT 73564    Date: 7/2/2020     The patient location is: home in living room  The chief complaint leading to consultation is: anxiety    Visit type: audiovisual    Face to Face time with patient: 60  75 minutes of total time spent on the encounter, which includes face to face time and non-face to face time preparing to see the patient (eg, review of tests), Obtaining and/or reviewing separately obtained history, Documenting clinical information in the electronic or other health record, Independently interpreting results (not separately reported) and communicating results to the patient/family/caregiver, or Care coordination (not separately reported).         Each patient to whom he or she provides medical services by telemedicine is:  (1) informed of the relationship between the physician and patient and the respective role of any other health care provider with respect to management of the patient; and (2) notified that he or she may decline to receive medical services by telemedicine and may withdraw from such care at any time.    Notes:       Site: [facility]    Referral source: Hyun Norwood M.D. [facility]    Clinical status of patient: Outpatient    Linda Rich, a 30 y.o. female, for initial evaluation visit.  Met with patient.    Chief complaint/reason for encounter: anxiety    History of present illness: Pt is a 31 yo female requesting to become established with LCSW in order to learn better coping mechanisms for anxiety. Pt currently established with [facility] and was referred by Psychiatrist. Pt states is taking Prozac 20 mg 1x day, Wellbutrin 150 mg 1 x day, and Xanax prn. Pt denies any recent, past, or current feelings of suicide or homicidal issues.       Pain: noncontributory    Symptoms:   · Mood: stable  · Anxiety: denied at this time  · Substance abuse: denied  · Cognitive functioning: high  · Health behaviors:  noncontributory    Psychiatric history: currently under psychiatric care, has not engaged in psychotherapy in the past    Medical history: noncontributory    Family history of psychiatric illness: Not divulged    Social history (marriage, employment, etc.): , pt is employed as a     Substance use:   Alcohol/ Drugs/Tobacco/ Caffeine: No concerns indicated or reported    Current medications and drug reactions (include OTC, herbal): see medication list     Strengths and liabilities: Strength: Patient accepts guidance/feedback, Strength: Patient is expressive/articulate., Strength: Patient is intelligent., Strength: Patient is motivated for change., Strength: Patient has reasonable judgment., Strength: Patient is stable.    Current Evaluation:     Mental Status Exam:  General Appearance:  unremarkable, age appropriate   Speech: normal tone, normal rate, normal pitch, normal volume      Level of Cooperation: cooperative      Thought Processes: normal and logical   Mood: steady, happy      Thought Content: normal, no suicidality, no homicidality, delusions, or paranoia   Affect: congruent and appropriate   Orientation: Oriented x3   Memory: No issues indicated   Attention Span & Concentration: intact   Fund of General Knowledge: intact and appropriate to age and level of education   Abstract Reasoning: interpretation of similarities was concrete, interpretation of proverbs was concrete   Judgment & Insight: good     Language  intact     Diagnostic Impression - Plan:       ICD-10-CM ICD-9-CM   1. Anxiety  F41.9 300.00   2. Panic disorder without agoraphobia with mild panic attacks  F41.0 300.01        General impression: Pt 29 yo female that presents today via tele health in order to establish services with LCSW in order to learn better coping mechanisms with anxiety. Pt states has a dx and history of anxiety and panic attacks and currently meets with [facility] staff for medication management. Pt  states would like to learn some behavior modification techniques to better address anxiety attacks and feelings when occurring. Pt reports that last bout of anxiety was a few months ago and was unable to identify what was the cause.  Pt discussed symptoms and reactions during attack and states that afterwards is overcome with guilt and self judgment.  SW provided intervention through psychotherapy, education on anxiety/panic attacks, and teaching technics on anxiety reduction, behavior modification, and mindfulness. Pt receptive with therapy and requests to return in 2 weeks     Plan:individual psychotherapy and medication management by physician, CBT    Return to Clinic: 2 weeks    Length of Service (minutes): 60

## 2020-07-02 NOTE — PATIENT INSTRUCTIONS
Treating Anxiety Disorders with Therapy    If you have an anxiety disorder, you dont have to suffer anymore. Treatment is available. Therapy (also called counseling) is often a helpful treatment for anxiety disorders. With therapy, a specially trained professional (therapist) helps you face and learn to manage your anxiety. Therapy can be short-term or long-term depending on your needs. In some cases, medicine may also be prescribed with therapy. It may take time before you notice how much therapy is helping, but stick with it. With therapy, you can feel better.  Cognitive behavioral therapy (CBT)  Cognitive behavioral therapy (CBT) teaches you to manage anxiety. It does this by helping you understand how you think and act when youre anxious. Research has shown CBT to be a very effective treatment for anxiety disorders. How CBT is run is almost like a class. It involves homework and activities to build skills that teach you to cope with anxiety step by step. It can be done in a group or one-on-one, and often takes place for a set number of sessions. CBT has two main parts:  · Cognitive therapy helps you identify the negative, irrational thoughts that occur with your anxiety. Youll learn to replace these with more positive, realistic thoughts.  · Behavioral therapy helps you change how you react to anxiety. Youll learn coping skills and methods for relaxing to help you better deal with anxiety.  Other forms of therapy  Other therapy methods may work better for you than CBT. Or, you may move from CBT to another form of therapy as your treatment needs change. This may mean meeting with a therapist by yourself or in a group. Therapy can also help you work through problems in your life, such as drug or alcohol dependence, that may be making your anxiety worse.  Getting better takes time  Therapy will help you feel better and teach you skills to help manage anxiety long term. But change doesnt happen right away. It  takes a commitment from you. And treatment only works if you learn to face the causes of your anxiety. So, you might feel worse before you feel better. This can sometimes make it hard to stick with it. But remember: Therapy is a very effective treatment. The results will be well worth it.  Helping yourself  If anxiety is wearing you down, here are some things you can do to cope:  · Check with your doctor and rule out any physical problems that may be causing the anxiety symptoms.  · If an anxiety disorder is diagnosed, seek mental healthcare. This is an illness and it can respond to treatment. Most types of anxiety disorders will respond to talk therapy and medicine.  · Educate yourself about anxiety disorders. Keep track of helpful online resources and books you can use during stressful periods.  · Try stress management techniques such as meditation.  · Consider online or in-person support groups.  · Dont fight your feelings. Anxiety feeds itself. The more you worry about it, the worse it gets. Instead, try to identify what might have triggered your anxiety. Then try to put this threat in perspective.  · Keep in mind that you cant control everything about a situation. Change what you can and let the rest take its course.  · Exercise -- its a great way to relieve tension and help your body feel relaxed.  · Examine your life for stress, and try to find ways to reduce it.  · Avoid caffeine and nicotine, which can make anxiety symptoms worse.  · Fight the temptation to turn to alcohol or unprescribed drugs for relief. They only make things worse in the long run.   Date Last Reviewed: 1/1/2017  © 2654-2020 [employer]. [address] [place] [address], [place] [address]. All rights reserved. This information is not intended as a substitute for professional medical care. Always follow your healthcare professional's instructions.

## 2020-07-03 LAB
ALBUMIN SERPL-MCNC: 3.9 G/DL (ref 3.6–5.1)
ALBUMIN/GLOB SERPL: 1.6 (CALC) (ref 1–2.5)
ALP SERPL-CCNC: 56 U/L (ref 31–125)
ALT SERPL-CCNC: 10 U/L (ref 6–29)
AST SERPL-CCNC: 12 U/L (ref 10–30)
BASOPHILS # BLD AUTO: 39 CELLS/UL (ref 0–200)
BASOPHILS NFR BLD AUTO: 0.8 %
BILIRUB SERPL-MCNC: 0.3 MG/DL (ref 0.2–1.2)
BUN SERPL-MCNC: 9 MG/DL (ref 7–25)
BUN/CREAT SERPL: NORMAL (CALC) (ref 6–22)
CALCIUM SERPL-MCNC: 8.8 MG/DL (ref 8.6–10.2)
CHLORIDE SERPL-SCNC: 106 MMOL/L (ref 98–110)
CO2 SERPL-SCNC: 26 MMOL/L (ref 20–32)
CREAT SERPL-MCNC: 0.73 MG/DL (ref 0.5–1.1)
EOSINOPHIL # BLD AUTO: 49 CELLS/UL (ref 15–500)
EOSINOPHIL NFR BLD AUTO: 1 %
ERYTHROCYTE [DISTWIDTH] IN BLOOD BY AUTOMATED COUNT: 12.3 % (ref 11–15)
GFRSERPLBLD MDRD-ARVRAT: 112 ML/MIN/1.73M2
GLOBULIN SER CALC-MCNC: 2.5 G/DL (CALC) (ref 1.9–3.7)
GLUCOSE SERPL-MCNC: 83 MG/DL (ref 65–99)
HCT VFR BLD AUTO: 37.8 % (ref 35–45)
HGB BLD-MCNC: 12.1 G/DL (ref 11.7–15.5)
IRON SERPL-MCNC: 71 MCG/DL (ref 40–190)
LYMPHOCYTES # BLD AUTO: 1671 CELLS/UL (ref 850–3900)
LYMPHOCYTES NFR BLD AUTO: 34.1 %
MCH RBC QN AUTO: 29.6 PG (ref 27–33)
MCHC RBC AUTO-ENTMCNC: 32 G/DL (ref 32–36)
MCV RBC AUTO: 92.4 FL (ref 80–100)
MONOCYTES # BLD AUTO: 343 CELLS/UL (ref 200–950)
MONOCYTES NFR BLD AUTO: 7 %
NEUTROPHILS # BLD AUTO: 2798 CELLS/UL (ref 1500–7800)
NEUTROPHILS NFR BLD AUTO: 57.1 %
PLATELET # BLD AUTO: 305 THOUSAND/UL (ref 140–400)
PMV BLD REES-ECKER: 10.3 FL (ref 7.5–12.5)
POTASSIUM SERPL-SCNC: 4.3 MMOL/L (ref 3.5–5.3)
PROT SERPL-MCNC: 6.4 G/DL (ref 6.1–8.1)
RBC # BLD AUTO: 4.09 MILLION/UL (ref 3.8–5.1)
SODIUM SERPL-SCNC: 138 MMOL/L (ref 135–146)
TSH SERPL-ACNC: 4.35 MIU/L
VIT B12 SERPL-MCNC: 304 PG/ML (ref 200–1100)
WBC # BLD AUTO: 4.9 THOUSAND/UL (ref 3.8–10.8)

## 2020-07-16 ENCOUNTER — OFFICE VISIT (OUTPATIENT)
Dept: PSYCHIATRY | Facility: CLINIC | Age: 30
End: 2020-07-16
Payer: COMMERCIAL

## 2020-07-16 DIAGNOSIS — F41.9 ANXIETY: Primary | ICD-10-CM

## 2020-07-16 DIAGNOSIS — F41.0 PANIC DISORDER WITHOUT AGORAPHOBIA WITH MILD PANIC ATTACKS: ICD-10-CM

## 2020-07-16 PROCEDURE — 90834 PR PSYCHOTHERAPY W/PATIENT, 45 MIN: ICD-10-PCS | Mod: 95,,,

## 2020-07-16 PROCEDURE — 90834 PSYTX W PT 45 MINUTES: CPT | Mod: 95,,,

## 2020-07-16 NOTE — PROGRESS NOTES
Individual Psychotherapy (PhD/LCSW)    7/16/2020    The patient location is: at home in living room  The chief complaint leading to consultation is: anxiety    Visit type: audiovisual    Face to Face time with patient: 45  55 minutes of total time spent on the encounter, which includes face to face time and non-face to face time preparing to see the patient (eg, review of tests), Obtaining and/or reviewing separately obtained history, Documenting clinical information in the electronic or other health record, Independently interpreting results (not separately reported) and communicating results to the patient/family/caregiver, or Care coordination (not separately reported).         Each patient to whom he or she provides medical services by telemedicine is:  (1) informed of the relationship between the physician and patient and the respective role of any other health care provider with respect to management of the patient; and (2) notified that he or she may decline to receive medical services by telemedicine and may withdraw from such care at any time.      Therapeutic Intervention: Met with patient.  Outpatient - Behavior modifying psychotherapy 45 min - CPT code 41569    Chief complaint/reason for encounter: anxiety       Interval history and content of current session: Pt is a 31 yo female presenting of f/u with LCSW in order to address anxiety. Pt currently established with [facility]  Pt continues to take Prozac 20 mg 1x day, Wellbutrin 150 mg 1 x day, and Xanax prn. Pt denies any recent, past, or current feelings of suicide or homicidal issues.     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 Pt states became used to having free time and now is afraid she will be unable to manage life/work. Pt does report that journaling has helped. Re framing and Anxiety reduction tools utilized and completed through handouts  and interactive psychotherapy.  Pt continues to be receptive with therapy.    Treatment plan:  · Target symptoms: anxiety   · Why chosen therapy is appropriate versus another modality: relevant to diagnosis, patient responds to this modality, evidence based practice  · Outcome monitoring methods: self-report, observation  · Therapeutic intervention type: insight oriented psychotherapy, behavior modifying psychotherapy, supportive psychotherapy, interactive psychotherapy    Risk parameters:  Patient reports no suicidal ideation  Patient reports no homicidal ideation  Patient reports no self-injurious behavior  Patient reports no violent behavior    Verbal deficits: None    Patient's response to intervention:  The patient's response to intervention is accepting.    Progress toward goals and other mental status changes:  The patient's progress toward goals is good.    Diagnosis:     ICD-10-CM ICD-9-CM   1. Anxiety  F41.9 300.00   2. Panic disorder without agoraphobia with mild panic attacks  F41.0 300.01       Plan:   individual psychotherapy, continued med management by psychiatrist, journaling, practice with behavior modification and engaging in self care practice      Return to clinic: 2 weeks    Length of Service (minutes): 45

## 2020-07-20 ENCOUNTER — OFFICE VISIT (OUTPATIENT)
Dept: PSYCHIATRY | Facility: CLINIC | Age: 30
End: 2020-07-20
Payer: COMMERCIAL

## 2020-07-20 DIAGNOSIS — F43.23 ADJUSTMENT DISORDER WITH MIXED ANXIETY AND DEPRESSED MOOD: ICD-10-CM

## 2020-07-20 DIAGNOSIS — F41.9 ANXIETY DISORDER, UNSPECIFIED TYPE: Primary | ICD-10-CM

## 2020-07-20 DIAGNOSIS — F41.0 PANIC ATTACKS: ICD-10-CM

## 2020-07-20 DIAGNOSIS — F32.A DEPRESSIVE DISORDER: ICD-10-CM

## 2020-07-20 PROCEDURE — 99214 PR OFFICE/OUTPT VISIT, EST, LEVL IV, 30-39 MIN: ICD-10-PCS | Mod: 95,,, | Performed by: STUDENT IN AN ORGANIZED HEALTH CARE EDUCATION/TRAINING PROGRAM

## 2020-07-20 PROCEDURE — 99214 OFFICE O/P EST MOD 30 MIN: CPT | Mod: 95,,, | Performed by: STUDENT IN AN ORGANIZED HEALTH CARE EDUCATION/TRAINING PROGRAM

## 2020-07-20 RX ORDER — ALPRAZOLAM 0.5 MG/1
0.5 TABLET ORAL DAILY PRN
Qty: 15 TABLET | Refills: 1 | Status: SHIPPED | OUTPATIENT
Start: 2020-07-20 | End: 2020-12-04 | Stop reason: SDUPTHER

## 2020-07-20 RX ORDER — BUPROPION HYDROCHLORIDE 150 MG/1
150 TABLET ORAL DAILY
Qty: 30 TABLET | Refills: 5 | Status: SHIPPED | OUTPATIENT
Start: 2020-07-20 | End: 2020-12-04 | Stop reason: SDUPTHER

## 2020-07-20 RX ORDER — FLUOXETINE HYDROCHLORIDE 20 MG/1
20 CAPSULE ORAL DAILY
Qty: 30 CAPSULE | Refills: 5 | Status: SHIPPED | OUTPATIENT
Start: 2020-07-20 | End: 2020-12-04 | Stop reason: SDUPTHER

## 2020-07-20 NOTE — PROGRESS NOTES
"Outpatient Psychiatry Follow-Up Visit (MD/NP)    7/20/2020    Clinical Status of Patient:  Outpatient (Ambulatory)    Chief Complaint:  Linda Rich is a 30 y.o. female who presents today for follow-up of depression and anxiety.  Chart reviewed. Met with patient virtually,    TELE PSYCHIATRY   Disclaimer   *The patient was informed despite using HIPPA compliant technology there may be risks including security breach, technological failure, inability to perform a comprehensive physical exam which could delay or prevent an accurate diagnosis, and potential complications from treatment decisions rendered over a telemedical platform. The patient understands and consented to the use of tele-health service as being a safe measure to mitigate during COVID Crisis.  The patient was also informed of the relationship between the physician and patient and the respective role of any other health care provider with respect to management of the patient; and notified that the pt may decline to receive medical services by telemedicine and may withdraw from such care at any time.     Patient's Current location, exact physical address: [address] [place] [address]  In Case of Emergency pts next of kin  Name:Kahlil Walker  Phone number: [phone]  Visit type: Virtual visit with synchronous audio and video  Total time spent with patient: 25 minutes     Interval History and Content of Current Session:  Interim Events/Subjective Report/Content of Current Session:   Patient seen virtually this afternoon. Confirmed present location and emergency contact on record as updated and accurate. Pt has been doing well on current medication regimen. Has begun psychotherapy and feels mood/disposition has improved with continued sessions. Pt endorses daily compliance with medications. Denies any new onset adverse/intolerable side effects. Has bene sleeping well, eating well, reports overall mood as "good, can't complain!" with " "mood-congruent affect. 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 Pt optimistic regarding plan, states it'll be somewhat difficult adapting from quarantined daily routine back to normal, working routine. Is looking forward to resuming some socialization. States her anxiety and panic attacks have been manageable. Pt has rarely had to use xanax since last evaluation. States she felt overwhelmed and took a xanax, but conveniently had a therapy appointment scheduled for the next day. Endorses learning new ways to coping with stress. No other questions, concerns, or complaints this visit. 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 Denies any SI, plan or intent. No overt evidence of HI/AVH.    Previous assessment:  Patient last seen 5/29/2020. Patient reports that she was feeling very "anxious for a couple weeks", since her last visit. However notes that for the past two day she have been feeling "very good" and now feels "silly" for making visit. Says that she was having associated physical symptoms of anxiety s.a nausea/ stomach upset and notes physical anxiety sxs are typical for her. Cannot identify any triggers for increased anxiety and notes that "nothing different was going on". Again says that being out of work and having "nothing to do" is giving her more time to perseverate/ worry. Says she is trying to keep busy but the longer she is out of work, the more she feels anxious about going back. Says she has been obsessing over things that don't need to be obssessed about and has had frequent episodes of tearfulness.  Also relates she had two  panic attacks over past two weeks. Irritable and not wanting to be around people.     Reports that she is continuing to take all of her " medications Prozac 20 mg PO daily, Wellbutrin 150 mg PO daily, and rarely PRN xanax for panic attacks as directed. Reports tolerating these medications well with no reported side effects. Sleep has not changed, still with low energy in afternoon. Emphasizes that past two days have been good. Denies any suicidal ideation/homicidal ideation and further denies any plan or intention for self harm or harm to to others. Denies AVH.      Patient has scheduled a therapy intake and will have her first visit on 7/02. Discussed option of increasing Prozac today vs proceeding with therapy to address anxiety and mood in conjunction with medications before making medication changes. Patient reports she would like to try therapy before changing mediations. Will plan for close follow up.     Review of Systems   · PSYCHIATRIC: Pertinant items are noted in the narrative.  · CONSTITUTIONAL: Denies fevers/ chills, denies significant weight change   · ENT: Denies sore throat  · RESPIRATORY: Denies SOB, cough  · CARDIOVASCULAR: No tachycardia or chest pain.  · GASTROINTESTINAL: Some nausea assc w/ anxiety. Denies vomiting/constipation/diarrhea     Past Medical, Family and Social History: The patient's past medical, family and social history have been reviewed and updated as appropriate within the electronic medical record - see encounter notes.    Compliance: yes    Side effects: None     Risk Parameters:  Patient reports no suicidal ideation  Patient reports no homicidal ideation  Patient reports no self-injurious behavior  Patient reports no violent behavior    Exam (detailed: at least 9 elements; comprehensive: all 15 elements)   Constitutional  Vitals:  Most recent vital signs, dated less than 90 days prior to this appointment, were reviewed.   There were no vitals filed for this visit.     General:  unremarkable, age appropriate     Musculoskeletal  Muscle Strength/Tone:  Unable to assess    Gait & Station:  Unable to assess      "  Psychiatric  Speech:  no latency; no press   Mood & Affect:  happy, euthymic, "good, can't complain!"  congruent and appropriate, mood-congruent, full   Thought Process:  normal and logical, goal-directed, logical   Associations:  intact   Thought Content:  normal, no suicidality, no homicidality, delusions, or paranoia   Insight:  intact, has awareness of illness   Judgement: behavior is adequate to circumstances, age appropriate   Orientation:  grossly intact, person, place, situation   Memory: intact for content of interview   Language: grossly intact   Attention Span & Concentration:  able to focus   Fund of Knowledge:  intact and appropriate to age and level of education, familiar with aspects of current personal life     Assessment and Diagnosis   Status/Progress: Based on the examination today, the patient's problem(s) is/are improved and well controlled.  New problems have not been presented today.   Diagnostic uncertainty and Lack of compliance are not complicating management of the primary condition.      General Impression:   Unspecified depression, in remission   Unspecified anxiety d/o, likely LUTHER with panic attacks     Intervention/Counseling/Treatment Plan      Treatment Plan/Recommendations:   - Continue Prozac 20 mg daily   - Continue Wellbutrin  mg daily  - Continue Xanax 0.5 mg PO daily PRN for panic attacks.   - Continue psychotherapy    Medication Management: Discussed with patient informed consent including diagnosis, risks and benefits of proposed treatment above vs. alternative treatments vs. no treatment, as well as serious and common side effects of these treatments, and the inherent unpredictability of individual responses to these treatments. The patient expresses understanding of the above and displays the capacity to agree with this current plan. Patient also agrees that, currently, the benefits outweigh the risks and would like to pursue treatment at this time, and had no other " questions.    Patient instructions:   · Keep future appointments and take medications as prescribed.   · Abstain from substance use.   · In the event of an emergency, including suicidal ideation, patient was advised to go to the nearest emergency room or call 911.  · Contact with questions. Patient aware that office/EMR communications are for non-emergent issues only and may take up to several days for response     Return to clinic: 3 months or sooner as needed    Case to be discussed with psychiatry attending, Dr. Ana Mondragon M.D.  [facility] PGY-3  [facility]

## 2020-07-20 NOTE — PROGRESS NOTES
STAFF COMMENTS: I have discussed patientt with Dr. Mondragon and reviewed the history and exam. I agree and concur with the assessment and plan.  ]

## 2020-07-30 ENCOUNTER — OFFICE VISIT (OUTPATIENT)
Dept: PSYCHIATRY | Facility: CLINIC | Age: 30
End: 2020-07-30
Payer: COMMERCIAL

## 2020-07-30 DIAGNOSIS — F41.9 ANXIETY: ICD-10-CM

## 2020-07-30 DIAGNOSIS — F41.0 PANIC DISORDER WITHOUT AGORAPHOBIA WITH MILD PANIC ATTACKS: Primary | ICD-10-CM

## 2020-07-30 PROCEDURE — 90834 PR PSYCHOTHERAPY W/PATIENT, 45 MIN: ICD-10-PCS | Mod: 95,,,

## 2020-07-30 PROCEDURE — 90834 PSYTX W PT 45 MINUTES: CPT | Mod: 95,,,

## 2020-07-30 NOTE — PROGRESS NOTES
Individual Psychotherapy (PhD/LCSW)    7/30/2020    The patient location is: at home in living room  The chief complaint leading to consultation is: anxiety    Visit type: audiovisual    Face to Face time with patient: 45  55 minutes of total time spent on the encounter, which includes face to face time and non-face to face time preparing to see the patient (eg, review of tests), Obtaining and/or reviewing separately obtained history, Documenting clinical information in the electronic or other health record, Independently interpreting results (not separately reported) and communicating results to the patient/family/caregiver, or Care coordination (not separately reported).         Each patient to whom he or she provides medical services by telemedicine is:  (1) informed of the relationship between the physician and patient and the respective role of any other health care provider with respect to management of the patient; and (2) notified that he or she may decline to receive medical services by telemedicine and may withdraw from such care at any time.      Therapeutic Intervention: Met with patient.  Outpatient - Behavior modifying psychotherapy 45 min - CPT code 01826    Chief complaint/reason for encounter: anxiety       Interval history and content of current session: Pt is a 29 yo female presenting of f/u with LCSW in order to address anxiety. Pt currently established with [facility]  Pt continues to take Prozac 20 mg 1x day, Wellbutrin 150 mg 1 x day, and Xanax prn. Pt denies any recent, past, or current feelings of suicide or homicidal issues.     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 Pt states was very anxious last week about taking on a 2 week summer camp, however states the program ended up being much better than expected. Pt report continues to journal and is now more excited than nervous to return to work. Continued re-framing and Anxiety  reduction tools/techniques.  Pt continues to be receptive with therapy and would like to return in 2-3 weeks.     Treatment plan:  · Target symptoms: anxiety   · Why chosen therapy is appropriate versus another modality: relevant to diagnosis, patient responds to this modality, evidence based practice  · Outcome monitoring methods: self-report, observation  · Therapeutic intervention type: insight oriented psychotherapy, behavior modifying psychotherapy, supportive psychotherapy, interactive psychotherapy    Risk parameters:  Patient reports no suicidal ideation  Patient reports no homicidal ideation  Patient reports no self-injurious behavior  Patient reports no violent behavior    Verbal deficits: None    Patient's response to intervention:  The patient's response to intervention is accepting.    Progress toward goals and other mental status changes:  The patient's progress toward goals is good.    Diagnosis:     ICD-10-CM ICD-9-CM   1. Panic disorder without agoraphobia with mild panic attacks  F41.0 300.01   2. Anxiety  F41.9 300.00       Plan:   individual psychotherapy, continued med management by psychiatrist, journaling, practice with behavior modification and engaging in self care practice      Return to clinic: 2 weeks    Length of Service (minutes): 45

## 2020-09-10 ENCOUNTER — OFFICE VISIT (OUTPATIENT)
Dept: PSYCHIATRY | Facility: CLINIC | Age: 30
End: 2020-09-10
Payer: COMMERCIAL

## 2020-09-10 DIAGNOSIS — F43.22 ADJUSTMENT DISORDER WITH ANXIOUS MOOD: Primary | ICD-10-CM

## 2020-09-10 DIAGNOSIS — F41.0 PANIC DISORDER WITHOUT AGORAPHOBIA WITH MILD PANIC ATTACKS: ICD-10-CM

## 2020-09-10 DIAGNOSIS — F41.1 GAD (GENERALIZED ANXIETY DISORDER): ICD-10-CM

## 2020-09-10 PROCEDURE — 90834 PSYTX W PT 45 MINUTES: CPT | Mod: 95,,, | Performed by: SOCIAL WORKER

## 2020-09-10 PROCEDURE — 90834 PR PSYCHOTHERAPY W/PATIENT, 45 MIN: ICD-10-PCS | Mod: 95,,, | Performed by: SOCIAL WORKER

## 2020-09-10 NOTE — PROGRESS NOTES
Individual Psychotherapy (PhD/LCSW)    9/10/2020    The patient location is: in [facility] in [facility]  The chief complaint leading to consultation is: anxiety    Visit type: audiovisual    Face to Face time with patient: 45  55 minutes of total time spent on the encounter, which includes face to face time and non-face to face time preparing to see the patient (eg, review of tests), Obtaining and/or reviewing separately obtained history, Documenting clinical information in the electronic or other health record, Independently interpreting results (not separately reported) and communicating results to the patient/family/caregiver, or Care coordination (not separately reported).         Each patient to whom he or she provides medical services by telemedicine is:  (1) informed of the relationship between the physician and patient and the respective role of any other health care provider with respect to management of the patient; and (2) notified that he or she may decline to receive medical services by telemedicine and may withdraw from such care at any time.      Therapeutic Intervention: Met with patient.   Outpatient - Insight oriented psychotherapy 45 min - CPT code 10401, Outpatient - Behavior modifying psychotherapy 45 min - CPT code 31358, Outpatient - Supportive psychotherapy 45 min - CPT Code 04473 and Outpatient - Interactive psychotherapy 45 min - CPT code 98415    Chief complaint/reason for encounter: anxiety       Interval history and content of current session: Pt is a 31 yo female presenting of f/u with LCSW in order to address anxiety. Pt currently established with [facility]  Pt continues to take Prozac 20 mg 1x day, Wellbutrin 150 mg 1 x day, and Xanax prn. Pt denies any recent, past, or current feelings of suicide or homicidal issues.     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 Patient reports no  panic attacks and has been able to utilize anxiety reduction techniques taught during session.  Pt reports continues to journal and finds it enjoyable. Pt spoke excitedly about her job. Supportive, Interactive psychotherapy, self-oriented, and behavior modification psychotherapy provided through active listening and discussion, re-framing,validation of feelings, positive reinforcement, encouragement, and self assessment on progress.   Pt continues to be receptive with therapy and would like to return in 3 weeks.     Treatment plan:  · Target symptoms: anxiety   · Why chosen therapy is appropriate versus another modality: relevant to diagnosis, patient responds to this modality, evidence based practice  · Outcome monitoring methods: self-report, observation  · Therapeutic intervention type: insight oriented psychotherapy, behavior modifying psychotherapy, supportive psychotherapy, interactive psychotherapy    Risk parameters:  Patient reports no suicidal ideation  Patient reports no homicidal ideation  Patient reports no self-injurious behavior  Patient reports no violent behavior    Verbal deficits: None    Patient's response to intervention:  The patient's response to intervention is accepting.    Progress toward goals and other mental status changes:  The patient's progress toward goals is good.    Diagnosis:     ICD-10-CM ICD-9-CM   1. Adjustment disorder with anxious mood  F43.22 309.24   2. LUTHER (generalized anxiety disorder)  F41.1 300.02   3. Panic disorder without agoraphobia with mild panic attacks  F41.0 300.01       Plan:   individual psychotherapy, continued med management by psychiatrist, journaling, practice with behavior modification and engaging in self care practice      Return to clinic: 3 weeks    Length of Service (minutes): 45

## 2020-10-09 ENCOUNTER — PATIENT MESSAGE (OUTPATIENT)
Dept: PSYCHIATRY | Facility: CLINIC | Age: 30
End: 2020-10-09

## 2020-10-09 ENCOUNTER — OFFICE VISIT (OUTPATIENT)
Dept: PSYCHIATRY | Facility: CLINIC | Age: 30
End: 2020-10-09
Payer: COMMERCIAL

## 2020-10-09 DIAGNOSIS — F41.1 GAD (GENERALIZED ANXIETY DISORDER): Primary | ICD-10-CM

## 2020-10-09 PROCEDURE — 90834 PR PSYCHOTHERAPY W/PATIENT, 45 MIN: ICD-10-PCS | Mod: 95,,, | Performed by: SOCIAL WORKER

## 2020-10-09 PROCEDURE — 90834 PSYTX W PT 45 MINUTES: CPT | Mod: 95,,, | Performed by: SOCIAL WORKER

## 2020-10-16 NOTE — PROGRESS NOTES
Individual Psychotherapy (PhD/LCSW)    10/9/2020    The patient location is: in [facility] in [facility]  The chief complaint leading to consultation is: anxiety    Visit type: audiovisual    Face to Face time with patient: 45  55 minutes of total time spent on the encounter, which includes face to face time and non-face to face time preparing to see the patient (eg, review of tests), Obtaining and/or reviewing separately obtained history, Documenting clinical information in the electronic or other health record, Independently interpreting results (not separately reported) and communicating results to the patient/family/caregiver, or Care coordination (not separately reported).         Each patient to whom he or she provides medical services by telemedicine is:  (1) informed of the relationship between the physician and patient and the respective role of any other health care provider with respect to management of the patient; and (2) notified that he or she may decline to receive medical services by telemedicine and may withdraw from such care at any time.      Therapeutic Intervention: Met with patient.   Outpatient - Insight oriented psychotherapy 45 min - CPT code 66579, Outpatient - Behavior modifying psychotherapy 45 min - CPT code 55329, Outpatient - Supportive psychotherapy 45 min - CPT Code 75228 and Outpatient - Interactive psychotherapy 45 min - CPT code 70008    Chief complaint/reason for encounter: anxiety       Interval history and content of current session: Pt is a 31 yo female presenting of f/u with LCSW in order to address anxiety. Pt currently established with [facility]  Pt continues to take Prozac 20 mg 1x day, Wellbutrin 150 mg 1 x day, and Xanax prn. Pt denies any recent, past, or current feelings of suicide or homicidal issues.     Pt reports continued improved mental health and reports feeling happy within her job. Pt also reports is better at controlling her anxiety and has not had  any attacks. Pt was smiling and exuberant during call. LCSW and pt continued to review proactive anxiety reduction techniques and re-framing. Validation, positive reinforcement, and encouragement, provided. Pt continues to be receptive with therapy and would like to return in 3 weeks.     Treatment plan:  · Target symptoms: anxiety   · Why chosen therapy is appropriate versus another modality: relevant to diagnosis, patient responds to this modality, evidence based practice  · Outcome monitoring methods: self-report, observation  · Therapeutic intervention type: insight oriented psychotherapy, behavior modifying psychotherapy, supportive psychotherapy, interactive psychotherapy    Risk parameters:  Patient reports no suicidal ideation  Patient reports no homicidal ideation  Patient reports no self-injurious behavior  Patient reports no violent behavior    Verbal deficits: None    Patient's response to intervention:  The patient's response to intervention is accepting.    Progress toward goals and other mental status changes:  The patient's progress toward goals is good.    Diagnosis:   No diagnosis found.    Plan:   individual psychotherapy, continued med management by psychiatrist, Journaling, practice with behavior modification and engaging in self care practice      Return to clinic: 3 weeks    Length of Service (minutes): 45

## 2020-12-04 ENCOUNTER — OFFICE VISIT (OUTPATIENT)
Dept: PSYCHIATRY | Facility: CLINIC | Age: 30
End: 2020-12-04
Payer: COMMERCIAL

## 2020-12-04 DIAGNOSIS — F32.A DEPRESSION, UNSPECIFIED DEPRESSION TYPE: Primary | ICD-10-CM

## 2020-12-04 DIAGNOSIS — F41.0 PANIC DISORDER WITHOUT AGORAPHOBIA WITH MILD PANIC ATTACKS: ICD-10-CM

## 2020-12-04 PROCEDURE — 99213 PR OFFICE/OUTPT VISIT, EST, LEVL III, 20-29 MIN: ICD-10-PCS | Mod: 95,,,

## 2020-12-04 PROCEDURE — 99213 OFFICE O/P EST LOW 20 MIN: CPT | Mod: 95,,,

## 2020-12-04 RX ORDER — ALPRAZOLAM 0.5 MG/1
0.5 TABLET ORAL DAILY PRN
Qty: 15 TABLET | Refills: 1 | Status: SHIPPED | OUTPATIENT
Start: 2020-12-04 | End: 2021-06-04 | Stop reason: SDUPTHER

## 2020-12-04 RX ORDER — BUPROPION HYDROCHLORIDE 150 MG/1
150 TABLET ORAL DAILY
Qty: 30 TABLET | Refills: 5 | Status: SHIPPED | OUTPATIENT
Start: 2020-12-04 | End: 2021-06-04 | Stop reason: SDUPTHER

## 2020-12-04 RX ORDER — FLUOXETINE HYDROCHLORIDE 20 MG/1
20 CAPSULE ORAL DAILY
Qty: 30 CAPSULE | Refills: 5 | Status: SHIPPED | OUTPATIENT
Start: 2020-12-04 | End: 2021-09-22 | Stop reason: SDUPTHER

## 2020-12-04 NOTE — PROGRESS NOTES
"Outpatient Psychiatry Follow-Up Visit (MD/NP)    12/4/2020    Clinical Status of Patient:  Outpatient (Ambulatory)    Chief Complaint:  Linda Rich is a 30 y.o. female who presents today for follow-up of depression and anxiety.  Chart reviewed. Met with patient virtually,    TELE PSYCHIATRY   Disclaimer   *The patient was informed despite using HIPPA compliant technology there may be risks including security breach, technological failure, inability to perform a comprehensive physical exam which could delay or prevent an accurate diagnosis, and potential complications from treatment decisions rendered over a telemedical platform. The patient understands and consented to the use of tele-health service as being a safe measure to mitigate during COVID Crisis.  The patient was also informed of the relationship between the physician and patient and the respective role of any other health care provider with respect to management of the patient; and notified that the pt may decline to receive medical services by telemedicine and may withdraw from such care at any time.     Patient's Current location, exact physical address: [address] [place] [address]  In Case of Emergency pts next of kin  Name:Kahlil Walker  Phone number: [phone]  Visit type: Virtual visit with synchronous audio and video  Total time spent with patient: 15minutes     Interval History and Content of Current Session:  Interim Events/Subjective Report/Content of Current Session:   Pt states she has been doing very well since last visit. Takes her xanax 1-2x a month- states it makes her very drowsy. Anxiety has been well controlled. Has been on prozac 20 mg, welbutrin 150 mg daily for 3-4 years without issue. No SE from these medications. Sleep, appetite have been fair. 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 No SI, HI, AVH. No questions or concerns today.     Review of Systems   · PSYCHIATRIC: Pertinant items are " "noted in the narrative.  · CONSTITUTIONAL: Denies fevers/ chills, denies significant weight change   · ENT: Denies sore throat  · RESPIRATORY: Denies SOB, cough  · CARDIOVASCULAR: No tachycardia or chest pain.  · GASTROINTESTINAL: Some nausea assc w/ anxiety. Denies vomiting/constipation/diarrhea     Past Medical, Family and Social History: The patient's past medical, family and social history have been reviewed and updated as appropriate within the electronic medical record - see encounter notes.    Compliance: yes    Side effects: None     Risk Parameters:  Patient reports no suicidal ideation  Patient reports no homicidal ideation  Patient reports no self-injurious behavior  Patient reports no violent behavior    Exam (detailed: at least 9 elements; comprehensive: all 15 elements)   Constitutional  Vitals:  Most recent vital signs, dated less than 90 days prior to this appointment, were reviewed.   There were no vitals filed for this visit.     General:  unremarkable, age appropriate     Musculoskeletal  Muscle Strength/Tone:  Unable to assess    Gait & Station:  Unable to assess       Psychiatric  Speech:  no latency; no press   Mood & Affect:  happy, euthymic, "good, can't complain!"  congruent and appropriate, mood-congruent, full   Thought Process:  normal and logical, goal-directed, logical   Associations:  intact   Thought Content:  normal, no suicidality, no homicidality, delusions, or paranoia   Insight:  intact, has awareness of illness   Judgement: behavior is adequate to circumstances, age appropriate   Orientation:  grossly intact, person, place, situation   Memory: intact for content of interview   Language: grossly intact   Attention Span & Concentration:  able to focus   Fund of Knowledge:  intact and appropriate to age and level of education, familiar with aspects of current personal life     Assessment and Diagnosis   Status/Progress: Based on the examination today, the patient's problem(s) " is/are improved and well controlled.  New problems have not been presented today.   Diagnostic uncertainty and Lack of compliance are not complicating management of the primary condition.      General Impression:   Unspecified depression, in remission   Unspecified anxiety d/o, likely LUTHER with panic attacks     Intervention/Counseling/Treatment Plan      Treatment Plan/Recommendations:   - Continue Prozac 20 mg daily   - Continue Wellbutrin  mg daily  - Continue Xanax 0.5 mg PO daily PRN for panic attacks.   - Continue psychotherapy    Medication Management: Discussed with patient informed consent including diagnosis, risks and benefits of proposed treatment above vs. alternative treatments vs. no treatment, as well as serious and common side effects of these treatments, and the inherent unpredictability of individual responses to these treatments. The patient expresses understanding of the above and displays the capacity to agree with this current plan. Patient also agrees that, currently, the benefits outweigh the risks and would like to pursue treatment at this time, and had no other questions.    Patient instructions:   · Keep future appointments and take medications as prescribed.   · Abstain from substance use.   · In the event of an emergency, including suicidal ideation, patient was advised to go to the nearest emergency room or call 911.  · Contact with questions. Patient aware that office/EMR communications are for non-emergent issues only and may take up to several days for response     Return to clinic: 3 months or sooner as needed    Case to be discussed with psychiatry attending, Dr. Micheal Senior M.D.  [facility] PGY-3  [facility]

## 2020-12-11 NOTE — PROGRESS NOTES
12/11/2020: I reviewed and discussed this patient's treatment plan and diagnosis with Dr Rothman and agree with both at this time. PRISCILA Burton Md

## 2021-01-28 ENCOUNTER — OFFICE VISIT (OUTPATIENT)
Dept: PSYCHIATRY | Facility: CLINIC | Age: 31
End: 2021-01-28
Payer: COMMERCIAL

## 2021-01-28 DIAGNOSIS — F32.A DEPRESSION, UNSPECIFIED DEPRESSION TYPE: ICD-10-CM

## 2021-01-28 DIAGNOSIS — F41.1 GAD (GENERALIZED ANXIETY DISORDER): Primary | ICD-10-CM

## 2021-01-28 PROCEDURE — 90834 PSYTX W PT 45 MINUTES: CPT | Mod: 95,,, | Performed by: SOCIAL WORKER

## 2021-01-28 PROCEDURE — 90834 PR PSYCHOTHERAPY W/PATIENT, 45 MIN: ICD-10-PCS | Mod: 95,,, | Performed by: SOCIAL WORKER

## 2021-03-10 ENCOUNTER — PATIENT MESSAGE (OUTPATIENT)
Dept: INTERNAL MEDICINE | Facility: CLINIC | Age: 31
End: 2021-03-10

## 2021-03-10 ENCOUNTER — PATIENT MESSAGE (OUTPATIENT)
Dept: PSYCHIATRY | Facility: CLINIC | Age: 31
End: 2021-03-10

## 2021-03-10 DIAGNOSIS — E53.8 B12 DEFICIENCY: ICD-10-CM

## 2021-03-10 DIAGNOSIS — E61.1 IRON DEFICIENCY: Primary | ICD-10-CM

## 2021-06-04 ENCOUNTER — PATIENT MESSAGE (OUTPATIENT)
Dept: PSYCHIATRY | Facility: CLINIC | Age: 31
End: 2021-06-04

## 2021-06-04 ENCOUNTER — OFFICE VISIT (OUTPATIENT)
Dept: PSYCHIATRY | Facility: CLINIC | Age: 31
End: 2021-06-04
Payer: COMMERCIAL

## 2021-06-04 DIAGNOSIS — F32.A DEPRESSION, UNSPECIFIED DEPRESSION TYPE: Primary | ICD-10-CM

## 2021-06-04 DIAGNOSIS — F41.9 ANXIETY: ICD-10-CM

## 2021-06-04 PROCEDURE — 99213 OFFICE O/P EST LOW 20 MIN: CPT | Mod: 95,,,

## 2021-06-04 PROCEDURE — 99213 PR OFFICE/OUTPT VISIT, EST, LEVL III, 20-29 MIN: ICD-10-PCS | Mod: 95,,,

## 2021-06-04 RX ORDER — ALPRAZOLAM 0.5 MG/1
0.5 TABLET ORAL DAILY PRN
Qty: 15 TABLET | Refills: 1 | Status: SHIPPED | OUTPATIENT
Start: 2021-06-04 | End: 2021-09-22 | Stop reason: SDUPTHER

## 2021-06-04 RX ORDER — BUPROPION HYDROCHLORIDE 150 MG/1
150 TABLET ORAL DAILY
Qty: 30 TABLET | Refills: 5 | Status: SHIPPED | OUTPATIENT
Start: 2021-06-04 | End: 2021-09-22 | Stop reason: SDUPTHER

## 2021-06-04 RX ORDER — FLUOXETINE HYDROCHLORIDE 20 MG/1
20 CAPSULE ORAL DAILY
Qty: 30 CAPSULE | Refills: 11 | Status: SHIPPED | OUTPATIENT
Start: 2021-06-04 | End: 2022-03-16 | Stop reason: SDUPTHER

## 2021-06-11 ENCOUNTER — PATIENT MESSAGE (OUTPATIENT)
Dept: PSYCHIATRY | Facility: CLINIC | Age: 31
End: 2021-06-11

## 2021-09-22 ENCOUNTER — OFFICE VISIT (OUTPATIENT)
Dept: PSYCHIATRY | Facility: CLINIC | Age: 31
End: 2021-09-22
Payer: COMMERCIAL

## 2021-09-22 VITALS
HEART RATE: 88 BPM | BODY MASS INDEX: 27.21 KG/M2 | WEIGHT: 139.31 LBS | DIASTOLIC BLOOD PRESSURE: 68 MMHG | SYSTOLIC BLOOD PRESSURE: 108 MMHG

## 2021-09-22 DIAGNOSIS — F41.1 GAD (GENERALIZED ANXIETY DISORDER): Primary | ICD-10-CM

## 2021-09-22 DIAGNOSIS — F32.A DEPRESSIVE DISORDER: ICD-10-CM

## 2021-09-22 DIAGNOSIS — F41.0 PANIC DISORDER WITHOUT AGORAPHOBIA WITH MILD PANIC ATTACKS: ICD-10-CM

## 2021-09-22 PROCEDURE — 99999 PR PBB SHADOW E&M-EST. PATIENT-LVL I: ICD-10-PCS | Mod: PBBFAC,,, | Performed by: STUDENT IN AN ORGANIZED HEALTH CARE EDUCATION/TRAINING PROGRAM

## 2021-09-22 PROCEDURE — 99214 OFFICE O/P EST MOD 30 MIN: CPT | Mod: S$GLB,,, | Performed by: STUDENT IN AN ORGANIZED HEALTH CARE EDUCATION/TRAINING PROGRAM

## 2021-09-22 PROCEDURE — 99999 PR PBB SHADOW E&M-EST. PATIENT-LVL I: CPT | Mod: PBBFAC,,, | Performed by: STUDENT IN AN ORGANIZED HEALTH CARE EDUCATION/TRAINING PROGRAM

## 2021-09-22 PROCEDURE — 99214 PR OFFICE/OUTPT VISIT, EST, LEVL IV, 30-39 MIN: ICD-10-PCS | Mod: S$GLB,,, | Performed by: STUDENT IN AN ORGANIZED HEALTH CARE EDUCATION/TRAINING PROGRAM

## 2021-09-22 RX ORDER — ALPRAZOLAM 0.5 MG/1
0.5 TABLET ORAL DAILY PRN
Qty: 15 TABLET | Refills: 0 | Status: SHIPPED | OUTPATIENT
Start: 2021-09-22 | End: 2021-11-24 | Stop reason: SDUPTHER

## 2021-09-22 RX ORDER — BUSPIRONE HYDROCHLORIDE 15 MG/1
15 TABLET ORAL 2 TIMES DAILY
Qty: 60 TABLET | Refills: 2 | Status: SHIPPED | OUTPATIENT
Start: 2021-09-22 | End: 2021-11-24 | Stop reason: SDUPTHER

## 2021-09-22 RX ORDER — FLUOXETINE HYDROCHLORIDE 20 MG/1
20 CAPSULE ORAL DAILY
Qty: 90 CAPSULE | Refills: 2 | Status: SHIPPED | OUTPATIENT
Start: 2021-09-22 | End: 2021-11-24 | Stop reason: SDUPTHER

## 2021-09-22 RX ORDER — BUPROPION HYDROCHLORIDE 150 MG/1
150 TABLET ORAL DAILY
Qty: 90 TABLET | Refills: 2 | Status: SHIPPED | OUTPATIENT
Start: 2021-09-22 | End: 2021-11-24 | Stop reason: SDUPTHER

## 2021-11-24 ENCOUNTER — OFFICE VISIT (OUTPATIENT)
Dept: PSYCHIATRY | Facility: CLINIC | Age: 31
End: 2021-11-24
Payer: COMMERCIAL

## 2021-11-24 DIAGNOSIS — F41.0 PANIC DISORDER WITHOUT AGORAPHOBIA WITH MILD PANIC ATTACKS: ICD-10-CM

## 2021-11-24 DIAGNOSIS — F41.1 GAD (GENERALIZED ANXIETY DISORDER): ICD-10-CM

## 2021-11-24 DIAGNOSIS — F32.A DEPRESSIVE DISORDER: ICD-10-CM

## 2021-11-24 PROCEDURE — 99213 OFFICE O/P EST LOW 20 MIN: CPT | Mod: S$GLB,,, | Performed by: STUDENT IN AN ORGANIZED HEALTH CARE EDUCATION/TRAINING PROGRAM

## 2021-11-24 PROCEDURE — 99213 PR OFFICE/OUTPT VISIT, EST, LEVL III, 20-29 MIN: ICD-10-PCS | Mod: S$GLB,,, | Performed by: STUDENT IN AN ORGANIZED HEALTH CARE EDUCATION/TRAINING PROGRAM

## 2021-11-24 RX ORDER — BUSPIRONE HYDROCHLORIDE 30 MG/1
30 TABLET ORAL 2 TIMES DAILY
Qty: 60 TABLET | Refills: 3 | Status: SHIPPED | OUTPATIENT
Start: 2021-11-24 | End: 2022-03-16 | Stop reason: SDUPTHER

## 2021-11-24 RX ORDER — FLUOXETINE HYDROCHLORIDE 20 MG/1
20 CAPSULE ORAL DAILY
Qty: 90 CAPSULE | Refills: 3 | Status: SHIPPED | OUTPATIENT
Start: 2021-11-24 | End: 2022-06-29 | Stop reason: SDUPTHER

## 2021-11-24 RX ORDER — BUPROPION HYDROCHLORIDE 150 MG/1
150 TABLET ORAL DAILY
Qty: 90 TABLET | Refills: 3 | Status: SHIPPED | OUTPATIENT
Start: 2021-11-24 | End: 2022-03-16 | Stop reason: SDUPTHER

## 2021-11-24 RX ORDER — ALPRAZOLAM 0.5 MG/1
0.5 TABLET ORAL DAILY PRN
Qty: 10 TABLET | Refills: 0 | Status: SHIPPED | OUTPATIENT
Start: 2021-11-24 | End: 2022-03-16 | Stop reason: SDUPTHER

## 2021-12-09 ENCOUNTER — OFFICE VISIT (OUTPATIENT)
Dept: PSYCHIATRY | Facility: CLINIC | Age: 31
End: 2021-12-09
Payer: COMMERCIAL

## 2021-12-09 DIAGNOSIS — F33.0 MILD EPISODE OF RECURRENT MAJOR DEPRESSIVE DISORDER: ICD-10-CM

## 2021-12-09 DIAGNOSIS — F41.1 GAD (GENERALIZED ANXIETY DISORDER): Primary | ICD-10-CM

## 2021-12-09 PROCEDURE — 90837 PSYTX W PT 60 MINUTES: CPT | Mod: 95,,, | Performed by: SOCIAL WORKER

## 2021-12-09 PROCEDURE — 90837 PR PSYCHOTHERAPY W/PATIENT, 60 MIN: ICD-10-PCS | Mod: 95,,, | Performed by: SOCIAL WORKER

## 2021-12-22 ENCOUNTER — OFFICE VISIT (OUTPATIENT)
Dept: PSYCHIATRY | Facility: CLINIC | Age: 31
End: 2021-12-22
Payer: COMMERCIAL

## 2021-12-22 ENCOUNTER — PATIENT MESSAGE (OUTPATIENT)
Dept: PSYCHIATRY | Facility: CLINIC | Age: 31
End: 2021-12-22
Payer: COMMERCIAL

## 2021-12-22 DIAGNOSIS — F41.1 GAD (GENERALIZED ANXIETY DISORDER): Primary | ICD-10-CM

## 2021-12-22 DIAGNOSIS — F33.0 MILD EPISODE OF RECURRENT MAJOR DEPRESSIVE DISORDER: ICD-10-CM

## 2021-12-22 PROCEDURE — 90837 PR PSYCHOTHERAPY W/PATIENT, 60 MIN: ICD-10-PCS | Mod: 95,,, | Performed by: SOCIAL WORKER

## 2021-12-22 PROCEDURE — 90785 PSYTX COMPLEX INTERACTIVE: CPT | Mod: 95,,, | Performed by: SOCIAL WORKER

## 2021-12-22 PROCEDURE — 90837 PSYTX W PT 60 MINUTES: CPT | Mod: 95,,, | Performed by: SOCIAL WORKER

## 2021-12-22 PROCEDURE — 90785 PR INTERACTIVE COMPLEXITY: ICD-10-PCS | Mod: 95,,, | Performed by: SOCIAL WORKER

## 2021-12-29 ENCOUNTER — OFFICE VISIT (OUTPATIENT)
Dept: PSYCHIATRY | Facility: CLINIC | Age: 31
End: 2021-12-29
Payer: COMMERCIAL

## 2021-12-29 DIAGNOSIS — F41.1 GAD (GENERALIZED ANXIETY DISORDER): Primary | ICD-10-CM

## 2021-12-29 DIAGNOSIS — F32.A DEPRESSIVE DISORDER: ICD-10-CM

## 2021-12-29 PROCEDURE — 99213 PR OFFICE/OUTPT VISIT, EST, LEVL III, 20-29 MIN: ICD-10-PCS | Mod: 95,,, | Performed by: STUDENT IN AN ORGANIZED HEALTH CARE EDUCATION/TRAINING PROGRAM

## 2021-12-29 PROCEDURE — 99213 OFFICE O/P EST LOW 20 MIN: CPT | Mod: 95,,, | Performed by: STUDENT IN AN ORGANIZED HEALTH CARE EDUCATION/TRAINING PROGRAM

## 2022-02-10 ENCOUNTER — PATIENT MESSAGE (OUTPATIENT)
Dept: PSYCHIATRY | Facility: CLINIC | Age: 32
End: 2022-02-10
Payer: COMMERCIAL

## 2022-02-14 ENCOUNTER — PATIENT MESSAGE (OUTPATIENT)
Dept: RESEARCH | Facility: HOSPITAL | Age: 32
End: 2022-02-14
Payer: COMMERCIAL

## 2022-03-16 ENCOUNTER — TELEPHONE (OUTPATIENT)
Dept: PSYCHIATRY | Facility: HOSPITAL | Age: 32
End: 2022-03-16
Payer: COMMERCIAL

## 2022-03-16 DIAGNOSIS — F41.1 GAD (GENERALIZED ANXIETY DISORDER): ICD-10-CM

## 2022-03-16 DIAGNOSIS — F32.A DEPRESSIVE DISORDER: ICD-10-CM

## 2022-03-16 DIAGNOSIS — F41.0 PANIC DISORDER WITHOUT AGORAPHOBIA WITH MILD PANIC ATTACKS: ICD-10-CM

## 2022-03-16 RX ORDER — FLUOXETINE HYDROCHLORIDE 20 MG/1
20 CAPSULE ORAL DAILY
Qty: 30 CAPSULE | Refills: 3 | Status: SHIPPED | OUTPATIENT
Start: 2022-03-16 | End: 2023-03-17 | Stop reason: SDUPTHER

## 2022-03-16 RX ORDER — ALPRAZOLAM 0.5 MG/1
0.5 TABLET ORAL DAILY PRN
Qty: 10 TABLET | Refills: 0 | Status: SHIPPED | OUTPATIENT
Start: 2022-03-16 | End: 2022-06-29 | Stop reason: SDUPTHER

## 2022-03-16 RX ORDER — BUPROPION HYDROCHLORIDE 150 MG/1
150 TABLET ORAL DAILY
Qty: 90 TABLET | Refills: 3 | Status: SHIPPED | OUTPATIENT
Start: 2022-03-16 | End: 2022-06-29 | Stop reason: SDUPTHER

## 2022-03-16 RX ORDER — BUSPIRONE HYDROCHLORIDE 30 MG/1
30 TABLET ORAL 2 TIMES DAILY
Qty: 60 TABLET | Refills: 3 | Status: SHIPPED | OUTPATIENT
Start: 2022-03-16 | End: 2022-06-29 | Stop reason: SDUPTHER

## 2022-03-16 NOTE — TELEPHONE ENCOUNTER
----- Message from Sloane Levin MA sent at 3/15/2022  3:54 PM CDT -----  Regarding: prior auth  Pt called said she cant get her meds refill cause she needs a prior auth

## 2022-06-29 ENCOUNTER — OFFICE VISIT (OUTPATIENT)
Dept: PSYCHIATRY | Facility: CLINIC | Age: 32
End: 2022-06-29
Payer: COMMERCIAL

## 2022-06-29 DIAGNOSIS — F41.1 GAD (GENERALIZED ANXIETY DISORDER): ICD-10-CM

## 2022-06-29 DIAGNOSIS — F41.0 PANIC DISORDER WITHOUT AGORAPHOBIA WITH MILD PANIC ATTACKS: ICD-10-CM

## 2022-06-29 DIAGNOSIS — F32.A DEPRESSIVE DISORDER: ICD-10-CM

## 2022-06-29 PROCEDURE — 99214 OFFICE O/P EST MOD 30 MIN: CPT | Mod: 95,,, | Performed by: STUDENT IN AN ORGANIZED HEALTH CARE EDUCATION/TRAINING PROGRAM

## 2022-06-29 PROCEDURE — 99214 PR OFFICE/OUTPT VISIT, EST, LEVL IV, 30-39 MIN: ICD-10-PCS | Mod: 95,,, | Performed by: STUDENT IN AN ORGANIZED HEALTH CARE EDUCATION/TRAINING PROGRAM

## 2022-06-29 RX ORDER — BUPROPION HYDROCHLORIDE 150 MG/1
150 TABLET ORAL DAILY
Qty: 90 TABLET | Refills: 7 | Status: SHIPPED | OUTPATIENT
Start: 2022-06-29 | End: 2022-12-16 | Stop reason: SDUPTHER

## 2022-06-29 RX ORDER — ALPRAZOLAM 0.5 MG/1
0.5 TABLET ORAL DAILY PRN
Qty: 10 TABLET | Refills: 0 | Status: SHIPPED | OUTPATIENT
Start: 2022-06-29 | End: 2022-12-16 | Stop reason: SDUPTHER

## 2022-06-29 RX ORDER — FLUOXETINE HYDROCHLORIDE 20 MG/1
20 CAPSULE ORAL DAILY
Qty: 90 CAPSULE | Refills: 7 | Status: SHIPPED | OUTPATIENT
Start: 2022-06-29 | End: 2022-12-16

## 2022-06-29 RX ORDER — BUSPIRONE HYDROCHLORIDE 30 MG/1
30 TABLET ORAL 2 TIMES DAILY
Qty: 60 TABLET | Refills: 7 | Status: SHIPPED | OUTPATIENT
Start: 2022-06-29 | End: 2023-03-17

## 2022-06-29 NOTE — PROGRESS NOTES
STAFF COMMENTS: I have discussed pt with Dr. Diaz and reviewed the history and exam. I agree and concur with the assessment and plan.

## 2022-06-29 NOTE — PROGRESS NOTES
OUTPATIENT PSYCHIATRY FOLLOW UP VISIT     ENCOUNTER DATE:  6/29/22  SITE:  [facility]  LENGTH OF SESSION:  15 minutes     TELE PSYCHIATRY Disclaimer   *The patient was informed despite using HIPPA compliant technology there may be risks including security breach, technological failure, inability to perform a comprehensive physical exam which could delay or prevent an accurate diagnosis, and potential complications from treatment decisions rendered over a telemedical platform. The patient understands and consented to the use of tele-health service as being a safe measure to mitigate during COVID 19 Pandemic .  The patient was also informed of the relationship between the physician and patient and the respective role of any other health care provider with respect to management of the patient; and notified that the pt may decline to receive medical services by telemedicine and may withdraw from such care at any time.          Patient's Current location: [address] [place] [address]     In Case of Emergency pts next of kin  Name:   Phone number:     Visit type: Virtual visit with synchronous audio and video  Total time spent with patient: 15 minutes     CHIEF COMPLAINT:  MDD, in remission   LUTHER with panic attacks   R/o OCD        HISTORY OF PRESENTING ILLNESS:  Linda Rich is a 32 y.o. female with history of depression, anxiety who presents for follow up appointment.       Plan at last appointment on 12/29/2021:         -  Continue Buspar to 30mg BID.     - Continue Prozac 20 mg daily.  Consider titration to 40mg at next appt.    - Continue Wellbutrin  mg daily for mood and sexual dysfunction augmentation of prozac  - Continue Xanax 0.5 mg PO daily PRN for panic attacks.  Last refill provided  x10 tablets on 11/24/21.  Has not used any thus far.     - Continue with psychotherapy.  Would prefer biweekly but settling for once a month due to scheduling constraints at this time.     - Thought journal for potential OCD symptoms.             Interval history as told by Patient - & or family/friend/spouse/caregiver with pts permission       Doing well.  Med compliant, no s/e. Denies si/hi/avh.  Anxiety and mood well controlled.  Finds meds helpful.  2 xanax left.  Has used 8 in the last 6 months for anxiety/anxiety attacks. Reports no PAs in the last 2 months.   Would like to continue med regiment as is.        Psych ROS: denies    Stressors: dog with medical issues    Substance: NO tobacco, no smoking, no vape, no alcohol       PSYCHIATRIC REVIEW OF SYSTEMS:  (none/ yes- better/worse/stable/& what symptoms)     Symptoms of Depression: stable, in remission    Symptoms of Anxiety/ panic attacks: improved.  Mild anxiety attack once/month    Symptoms of Daisha/Hypomania: no  Symptoms of psychosis: no  PANSS Positive /Negative   Sleep: no  Appetite: no  OCD: improved  Alcohol: no    Illicit Drugs: no  Psychosocial stressors: no     Risk Parameters:  Patient reports no suicidal ideation  Patient reports no homicidal ideation  Patient reports no self-injurious behavior  Patient reports no violent behavior     PSYCHIATRIC MED REVIEW     Current psych meds  Medication side effects:  Sexual dysfunction on prozac.  Improved with wellbutrin augmentation.    Medication compliance:  yes     Previous psych meds trials  wellbutrin  prozac  buspar     PAST PSYCHIATRIC, MEDICAL, AND SOCIAL HISTORY REVIEWED  The patient's past medical, family and social history have been reviewed and updated as appropriate within the electronic medical record - see encounter notes.     MEDICAL REVIEW OF SYSTEMS:  Complete review of systems performed covering Constitutional, Musculoskeletal, Neurologic.  All systems negative/except as mentioned in HPI.       ALL MEDICATIONS:     Current Outpatient Medications:     ALPRAZolam (XANAX) 0.5 MG tablet, Take 1 tablet (0.5 mg total) by mouth daily as needed for Anxiety., Disp: 15 tablet,  Rfl: 0    buPROPion (WELLBUTRIN XL) 150 MG TB24 tablet, Take 1 tablet (150 mg total) by mouth once daily., Disp: 90 tablet, Rfl: 2    busPIRone (BUSPAR) 15 MG tablet, Take 1 tablet (15 mg total) by mouth 2 (two) times daily., Disp: 60 tablet, Rfl: 2    ferrous sulfate, dried (SLOW FE) 160 mg (50 mg iron) TbSR, Take 1 tablet (160 mg total) by mouth once daily., Disp: 30 tablet, Rfl: 3    FLUoxetine 20 MG capsule, Take 1 capsule (20 mg total) by mouth once daily., Disp: 30 capsule, Rfl: 11    FLUoxetine 20 MG capsule, Take 1 capsule (20 mg total) by mouth once daily., Disp: 90 capsule, Rfl: 2    ibuprofen (ADVIL,MOTRIN) 600 MG tablet, Take 1 tablet (600 mg total) by mouth daily as needed for Pain (headache)., Disp: 30 tablet, Rfl: 0    norgestimate-ethinyl estradiol (ORTHO TRI-CYCLEN LO) 0.18/0.215/0.25 mg-25 mcg tablet, Take 1 tablet by mouth once daily., Disp: 30 tablet, Rfl: 11     ALLERGIES:          Review of patient's allergies indicates:   Allergen Reactions    Cefdinir Diarrhea         RELEVANT LABS/STUDIES:              Lab Results   Component Value Date     WBC 4.9 07/02/2020     HGB 12.1 07/02/2020     HCT 37.8 07/02/2020     MCV 92.4 07/02/2020      07/02/2020      BMP            Lab Results   Component Value Date      07/02/2020     K 4.3 07/02/2020      07/02/2020     CO2 26 07/02/2020     BUN 9 07/02/2020     CREATININE 0.73 07/02/2020     CALCIUM 8.8 07/02/2020     ANIONGAP 8 02/26/2018     ESTGFRAFRICA 130 07/02/2020     EGFRNONAA 112 07/02/2020                Lab Results   Component Value Date     ALT 10 07/02/2020     AST 12 07/02/2020     ALKPHOS 56 07/02/2020     BILITOT 0.3 07/02/2020                Lab Results   Component Value Date     TSH 4.35 07/02/2020      No results found for: LABA1C, HGBA1C     VITALS  There were no vitals filed for this visit.     PHYSICAL EXAM  General: well developed, well nourished    Neurologic:   Gait: Normal   Psychomotor signs:  No  involuntary movements or tremor  AIMS: none     PSYCHIATRIC EXAM:     Mental Status Exam:  Appearance: unremarkable, age appropriate  Behavior/Cooperation: normal, cooperative  Speech: normal tone, normal rate, normal pitch, normal volume  Language: uses words appropriately; NO aphasia or dysarthria  Mood: euthymic  Affect: mood congruent  Thought Process: normal and logical  Thought Content: normal, no suicidality, no homicidality, delusions, or paranoia  Level of Consciousness: Alert and Oriented x3  Memory:  Intact  Attention/concentration: appropriate for age/education.   Fund of Knowledge: appears adequate  Insight:  Intact  Judgment: Intact         IMPRESSION:    Linda Rich is a 32 y.o. female with history of depression, anxiety who presents for follow up appointment for MM.    Symptoms well controlled, oerall stable over the last 6 months.  Continuing treatment as documented.       Status/Progress:  Based on the examination today, the patient's problem(s) is/are adequately controlled.  New problems have not been presented today.     DIAGNOSES:  MDD, in remission   LUTHER with panic attacks   R/o OCD     PLAN:  Psych Med:     Treatment Plan/Recommendations:            - Continue Buspar to 30mg BID.      - Continue Prozac 20 mg daily.  Consider titration to 40mg at next appt.    - Continue Wellbutrin  mg daily for mood and sexual dysfunction augmentation of prozac   Discussed BP monitoring over time and s/e including lowered seizure threshold.    - Continue Xanax 0.5 mg PO daily PRN for panic attacks.  Last refill provided  x10 tablets on 11/24/21.  Has not used any thus far.       - Continue with psychotherapy.    - Thought journal for potential OCD symptoms.      - RTC 6 months       Medication Management: Discussed with patient informed consent including diagnosis, risks and benefits of proposed treatment above vs. alternative treatments vs. no treatment, as well as serious and common side effects  of these treatments, and the inherent unpredictability of individual responses to these treatments. The patient expresses understanding of the above and displays the capacity to agree with this current plan. Patient also agrees that, currently, the benefits outweigh the risks and would like to pursue treatment at this time, and had no other questions.        Patient instructions:   · Keep future appointments and take medications as prescribed.   · Abstain from substance use.   · In the event of an emergency, including suicidal ideation, patient was advised to go to the nearest emergency room or call 911.  · Contact with questions. Patient aware that office/EMR communications are for non-emergent issues only and may take up to several days for response         · Discussed with patient informed consent, risks vs. benefits, alternative treatments, side effect profile and the inherent unpredictability of individual responses to these treatments. Answered any questions patient may have had. The patient expresses understanding of the above and displays the capacity to agree with this current plan             RETURN TO CLINIC:  6 month of sooner if needed.              Tito Diaz MD  [facility] Psychiatry PGY-3

## 2022-12-16 ENCOUNTER — OFFICE VISIT (OUTPATIENT)
Dept: PSYCHIATRY | Facility: CLINIC | Age: 32
End: 2022-12-16
Payer: COMMERCIAL

## 2022-12-16 DIAGNOSIS — F32.5 MAJOR DEPRESSIVE DISORDER IN FULL REMISSION, UNSPECIFIED WHETHER RECURRENT: ICD-10-CM

## 2022-12-16 DIAGNOSIS — F41.0 PANIC DISORDER WITHOUT AGORAPHOBIA WITH MILD PANIC ATTACKS: ICD-10-CM

## 2022-12-16 DIAGNOSIS — F41.1 GAD (GENERALIZED ANXIETY DISORDER): ICD-10-CM

## 2022-12-16 PROCEDURE — 99213 OFFICE O/P EST LOW 20 MIN: CPT | Mod: 95,,, | Performed by: STUDENT IN AN ORGANIZED HEALTH CARE EDUCATION/TRAINING PROGRAM

## 2022-12-16 PROCEDURE — 99213 PR OFFICE/OUTPT VISIT, EST, LEVL III, 20-29 MIN: ICD-10-PCS | Mod: 95,,, | Performed by: STUDENT IN AN ORGANIZED HEALTH CARE EDUCATION/TRAINING PROGRAM

## 2022-12-16 RX ORDER — ALPRAZOLAM 0.5 MG/1
0.5 TABLET ORAL DAILY PRN
Qty: 10 TABLET | Refills: 0 | Status: SHIPPED | OUTPATIENT
Start: 2022-12-16 | End: 2023-03-17 | Stop reason: SDUPTHER

## 2022-12-16 RX ORDER — BUPROPION HYDROCHLORIDE 150 MG/1
150 TABLET ORAL DAILY
Qty: 30 TABLET | Refills: 3 | Status: SHIPPED | OUTPATIENT
Start: 2022-12-16 | End: 2023-03-17 | Stop reason: SDUPTHER

## 2022-12-16 NOTE — PROGRESS NOTES
OUTPATIENT PSYCHIATRY FOLLOW UP VISIT     ENCOUNTER DATE:  6/29/22  SITE:  [facility], [facility]  LENGTH OF SESSION:  15 minutes     TELE PSYCHIATRY Disclaimer   *The patient was informed despite using HIPPA compliant technology there may be risks including security breach, technological failure, inability to perform a comprehensive physical exam which could delay or prevent an accurate diagnosis, and potential complications from treatment decisions rendered over a telemedical platform. The patient understands and consented to the use of tele-health service as being a safe measure to mitigate during COVID 19 Pandemic .  The patient was also informed of the relationship between the physician and patient and the respective role of any other health care provider with respect to management of the patient; and notified that the pt may decline to receive medical services by telemedicine and may withdraw from such care at any time.          Patient's Current location: [facility]     In Case of Emergency pts next of kin  Name: Aaron Rich ()  Phone number:   [phone]  Visit type: Virtual visit with synchronous audio and video  Total time spent with patient: 15 minutes     CHIEF COMPLAINT:  MDD, in remission   LUTHER with panic attacks   R/o OCD        HISTORY OF PRESENTING ILLNESS:  Linda Rich is a 32 y.o. female with history of depression, anxiety who presents for follow up appointment. Patient was last seen for appointment in this clinic by Dr. Diaz six months ago, during which no changes were made to the medication regimen.     Per last note, current psychotropic medications included:  - Buspar 30mg PO BID.     - Prozac 20 mg daily.  Consider titration to 40mg at next appt.    - Wellbutrin  mg daily for mood and sexual dysfunction augmentation of prozac  - Xanax 0.5 mg PO daily PRN for panic attacks.  Last refill provided  x10 tablets on 11/24/21.  Has  not used any thus far.         Interval history as told by Patient      Pt reports that she is doing well on current regimen. Struggles with intrusive and undesired sexual thoughts for the past year. Unclear what the trigger of these thoughts was. While this initially caused conflict in her marriage, pt states she is managing these thoughts better and feels less distressed when they arise. No other ocd symptoms (no checking, hygeine, counting, organizing, etc). Med compliant, no s/e. Denies si/hi/avh.  Anxiety and mood well controlled. Finds meds helpful. Pt typically uses Xanax to abort panic attacks, which remain rare. Last panic attack in October. Would like to continue med regiment as is.        Psych ROS: denies    Stressors: dog with medical issues    Substance: NO tobacco, no smoking, no vape, no alcohol       PSYCHIATRIC REVIEW OF SYSTEMS:  (none/ yes- better/worse/stable/& what symptoms)     Symptoms of Depression: stable, in remission    Symptoms of Anxiety/ panic attacks: improved.  Mild anxiety attack once/month    Symptoms of Daisha/Hypomania: no  Symptoms of psychosis: no  PANSS Positive /Negative  Sleep: no  Appetite: no  OCD: improved  Alcohol: no    Illicit Drugs: no  Psychosocial stressors: no     Risk Parameters:  Patient reports no suicidal ideation  Patient reports no homicidal ideation  Patient reports no self-injurious behavior  Patient reports no violent behavior     PSYCHIATRIC MED REVIEW     Current psych meds  Medication side effects:  Sexual dysfunction on prozac.  Improved with wellbutrin augmentation.    Medication compliance:  yes     Medical Hx:  Dx: denies  Meds: oral birth control  Allx: Cefdinir    Psychiatric Hx:  Diagnosis: Depressive disorder, panic disorder, LUTHER  Previous medication trials:  Antidepressants  - Wellbutrin: helpful for sexual dysfunction from antidepressant, reports dysphoria at higher doses  - Prozac  - Buspar: somewhat helpful for anxiety but not  dramatic  buspar  Hospitalizations: No  Suicide attempt: No    Substance use: No  Alcohol use: Yes - rare  Rehab: No    Social Hx:  with no children. Lives with . Works as a . Has Bachelors.    Family Hx:  Mother: anxiety       PAST PSYCHIATRIC, MEDICAL, AND SOCIAL HISTORY REVIEWED  The patient's past medical, family and social history have been reviewed and updated as appropriate within the electronic medical record - see encounter notes.     MEDICAL REVIEW OF SYSTEMS:  Complete review of systems performed covering Constitutional, Musculoskeletal, Neurologic.  All systems negative/except as mentioned in HPI.       ALL MEDICATIONS:     Current Outpatient Medications:     ALPRAZolam (XANAX) 0.5 MG tablet, Take 1 tablet (0.5 mg total) by mouth daily as needed for Anxiety., Disp: 15 tablet, Rfl: 0    buPROPion (WELLBUTRIN XL) 150 MG TB24 tablet, Take 1 tablet (150 mg total) by mouth once daily., Disp: 90 tablet, Rfl: 2    busPIRone (BUSPAR) 15 MG tablet, Take 1 tablet (15 mg total) by mouth 2 (two) times daily., Disp: 60 tablet, Rfl: 2    ferrous sulfate, dried (SLOW FE) 160 mg (50 mg iron) TbSR, Take 1 tablet (160 mg total) by mouth once daily., Disp: 30 tablet, Rfl: 3    FLUoxetine 20 MG capsule, Take 1 capsule (20 mg total) by mouth once daily., Disp: 30 capsule, Rfl: 11    FLUoxetine 20 MG capsule, Take 1 capsule (20 mg total) by mouth once daily., Disp: 90 capsule, Rfl: 2    ibuprofen (ADVIL,MOTRIN) 600 MG tablet, Take 1 tablet (600 mg total) by mouth daily as needed for Pain (headache)., Disp: 30 tablet, Rfl: 0    norgestimate-ethinyl estradiol (ORTHO TRI-CYCLEN LO) 0.18/0.215/0.25 mg-25 mcg tablet, Take 1 tablet by mouth once daily., Disp: 30 tablet, Rfl: 11     ALLERGIES:          Review of patient's allergies indicates:   Allergen Reactions    Cefdinir Diarrhea         RELEVANT LABS/STUDIES:              Lab Results   Component Value Date     WBC 4.9 07/02/2020     HGB 12.1  07/02/2020     HCT 37.8 07/02/2020     MCV 92.4 07/02/2020      07/02/2020      BMP            Lab Results   Component Value Date      07/02/2020     K 4.3 07/02/2020      07/02/2020     CO2 26 07/02/2020     BUN 9 07/02/2020     CREATININE 0.73 07/02/2020     CALCIUM 8.8 07/02/2020     ANIONGAP 8 02/26/2018     ESTGFRAFRICA 130 07/02/2020     EGFRNONAA 112 07/02/2020                Lab Results   Component Value Date     ALT 10 07/02/2020     AST 12 07/02/2020     ALKPHOS 56 07/02/2020     BILITOT 0.3 07/02/2020                Lab Results   Component Value Date     TSH 4.35 07/02/2020      No results found for: LABA1C, HGBA1C     VITALS  There were no vitals filed for this visit.     PHYSICAL EXAM  General: well developed, well nourished    Neurologic:   Gait: unable to assess   Psychomotor signs:  No involuntary movements or tremor  AIMS: none     PSYCHIATRIC EXAM:     Mental Status Exam:  Appearance: unremarkable, age appropriate  Behavior/Cooperation: normal, cooperative  Speech: normal tone, normal rate, normal pitch, normal volume  Language: uses words appropriately; NO aphasia or dysarthria  Mood: euthymic  Affect: mood congruent  Thought Process: normal and logical  Thought Content: normal, no suicidality, no homicidality, delusions, or paranoia  Level of Consciousness: Alert and Oriented x3  Memory:  Intact  Attention/concentration: appropriate for age/education.   Fund of Knowledge: appears adequate  Insight: has awareness of illness  Judgment: behavior appropriate to situation         IMPRESSION:    Linda Rich is a 32 y.o. female with history of depression, anxiety who presents for follow up appointment for MM.    Symptoms well controlled, overall stable over the last 6 months.  Continuing treatment as documented.       Status/Progress:  Based on the examination today, the patient's problem(s) is/are adequately controlled.  New problems have not been presented today.      DIAGNOSES:  MDD, in remission   LUTHER with panic attacks   Panic disorder  R/o OCD     PLAN:  Psych Med:     Treatment Plan/Recommendations:    - Continue Buspar to 30mg BID.      - Continue Prozac 20 mg daily.  Consider titration to 40mg at next appt.    - Continue Wellbutrin  mg daily for mood and sexual dysfunction augmentation of prozac   Discussed BP monitoring over time and s/e including lowered seizure threshold.    - Continue Xanax 0.5 mg PO daily PRN for panic attacks   - PDMP reviewed: patient filling all medications on time, no abnormalities noted    - Last refill picked up on 3/16/22   - Provided with one refills, starting on 12/16/22 , 10 pills       - Continue with psychotherapy  - RTC 6 months       Medication Management: Discussed with patient informed consent including diagnosis, risks and benefits of proposed treatment above vs. alternative treatments vs. no treatment, as well as serious and common side effects of these treatments, and the inherent unpredictability of individual responses to these treatments. The patient expresses understanding of the above and displays the capacity to agree with this current plan. Patient also agrees that, currently, the benefits outweigh the risks and would like to pursue treatment at this time, and had no other questions.        Patient instructions:   Keep future appointments and take medications as prescribed.   Abstain from substance use.   In the event of an emergency, including suicidal ideation, patient was advised to go to the nearest emergency room or call 911.  Contact with questions. Patient aware that office/EMR communications are for non-emergent issues only and may take up to several days for response         RETURN TO CLINIC:  6 month of sooner if needed.       Case to be discussed with supervising staff, MD Villa Leal MD  [facility], PGY-III

## 2023-01-09 ENCOUNTER — OFFICE VISIT (OUTPATIENT)
Dept: PSYCHIATRY | Facility: CLINIC | Age: 33
End: 2023-01-09
Payer: COMMERCIAL

## 2023-01-09 DIAGNOSIS — F41.0 GENERALIZED ANXIETY DISORDER WITH PANIC ATTACKS: Primary | ICD-10-CM

## 2023-01-09 DIAGNOSIS — F41.1 GENERALIZED ANXIETY DISORDER WITH PANIC ATTACKS: Primary | ICD-10-CM

## 2023-01-09 PROCEDURE — 90785 PSYTX COMPLEX INTERACTIVE: CPT | Mod: 95,,, | Performed by: SOCIAL WORKER

## 2023-01-09 PROCEDURE — 90837 PSYTX W PT 60 MINUTES: CPT | Mod: 95,,, | Performed by: SOCIAL WORKER

## 2023-01-09 PROCEDURE — 90785 PR INTERACTIVE COMPLEXITY: ICD-10-PCS | Mod: 95,,, | Performed by: SOCIAL WORKER

## 2023-01-09 PROCEDURE — 90837 PR PSYCHOTHERAPY W/PATIENT, 60 MIN: ICD-10-PCS | Mod: 95,,, | Performed by: SOCIAL WORKER

## 2023-01-09 NOTE — PROGRESS NOTES
Individual Psychotherapy (PhD/LCSW)    1/9/2023    The patient location is: in Living room ([facility])   The chief complaint leading to consultation is: anxiety    Visit type: audiovisual and audio mix due to technical issues.    Face to Face time with patient: 65  75 minutes of total time spent on the encounter, which includes face to face time and non-face to face time preparing to see the patient (eg, review of tests), Obtaining and/or reviewing separately obtained history, Documenting clinical information in the electronic or other health record, Independently interpreting results (not separately reported) and communicating results to the patient/family/caregiver, or Care coordination (not separately reported).         Each patient to whom he or she provides medical services by telemedicine is:  (1) informed of the relationship between the physician and patient and the respective role of any other health care provider with respect to management of the patient; and (2) notified that he or she may decline to receive medical services by telemedicine and may withdraw from such care at any time.      Therapeutic Intervention: Met with patient.    Outpatient - Insight oriented psychotherapy 60 min - CPT code 98641, Outpatient - Behavior modifying psychotherapy 60 min - CPT code 41674, Outpatient - Supportive psychotherapy 60 min - CPT Code 16517 and Outpatient - Interactive psychotherapy 60 min - CPT code 89924    Chief complaint/reason for encounter: anxiety, Depression       Interval history and content of current session: Pt is a 29 yo female presenting of f/u with LCSW in order to address anxiety and depression. Pt currently established with [facility]  Pt continues to take Prozac 20 mg 1x day, Wellbutrin 150 mg 1 x day, and Xanax prn. Pt recently added Buspar. Pt denies any recent, past, or current feelings of suicide or homicidal issues.     Pt presents via telemed. Last visit was 1 and 1/2 years  ago. Pt states she made the appt when she felt she was in poor mental health, however today she is feeling good. Pt wanted to talk about possible symptoms of OCD, ADHD, or Autism spectrum disorder. Pt met several markers for OCD, however pt denies it impeding any of her functionality or and denies performing any obsessions or compulsions for longer than 1 hour per day. Pt also met markers for ADHD however also reports functioning well in her job and while at home. Discussed options for testing through [facility] or outside locations. Pt interested in [facility]. Provided contact information. Provided r/o Autism. Discussed current ongoing anxiety and reviewed techniques for mental health management. Pt fully engaged.  Provided platform for ongoing discussion, processing, support, and feedback. Pt reports is receptive. Pt to return as needed.    Treatment plan:  Target symptoms: anxiety   Why chosen therapy is appropriate versus another modality: relevant to diagnosis, patient responds to this modality, evidence based practice  Outcome monitoring methods: self-report, observation  Therapeutic intervention type: insight oriented psychotherapy, behavior modifying psychotherapy, supportive psychotherapy, interactive psychotherapy    Risk parameters:  Patient reports no suicidal ideation  Patient reports no homicidal ideation  Patient reports no self-injurious behavior  Patient reports no violent behavior    Verbal deficits: None    Patient's response to intervention:  The patient's response to intervention is accepting.    Progress toward goals and other mental status changes:  The patient's progress toward goals is good.    Diagnosis:     ICD-10-CM ICD-9-CM   1. Generalized anxiety disorder with panic attacks  F41.1 300.02    F41.0 300.01         Plan:   individual psychotherapy, continued med management through MD      Return to clinic: 1 month    Length of Service (minutes): 65

## 2023-01-24 ENCOUNTER — PATIENT MESSAGE (OUTPATIENT)
Dept: PSYCHIATRY | Facility: CLINIC | Age: 33
End: 2023-01-24
Payer: COMMERCIAL

## 2023-02-23 ENCOUNTER — PATIENT MESSAGE (OUTPATIENT)
Dept: PSYCHIATRY | Facility: CLINIC | Age: 33
End: 2023-02-23
Payer: COMMERCIAL

## 2023-02-27 ENCOUNTER — PATIENT MESSAGE (OUTPATIENT)
Dept: PSYCHIATRY | Facility: CLINIC | Age: 33
End: 2023-02-27
Payer: COMMERCIAL

## 2023-03-17 ENCOUNTER — OFFICE VISIT (OUTPATIENT)
Dept: PSYCHIATRY | Facility: CLINIC | Age: 33
End: 2023-03-17
Payer: COMMERCIAL

## 2023-03-17 DIAGNOSIS — F41.0 PANIC DISORDER WITHOUT AGORAPHOBIA WITH MILD PANIC ATTACKS: ICD-10-CM

## 2023-03-17 DIAGNOSIS — F32.5 MAJOR DEPRESSIVE DISORDER IN FULL REMISSION, UNSPECIFIED WHETHER RECURRENT: ICD-10-CM

## 2023-03-17 DIAGNOSIS — F41.1 GAD (GENERALIZED ANXIETY DISORDER): ICD-10-CM

## 2023-03-17 PROCEDURE — 99213 OFFICE O/P EST LOW 20 MIN: CPT | Mod: 95,,, | Performed by: STUDENT IN AN ORGANIZED HEALTH CARE EDUCATION/TRAINING PROGRAM

## 2023-03-17 PROCEDURE — 99213 PR OFFICE/OUTPT VISIT, EST, LEVL III, 20-29 MIN: ICD-10-PCS | Mod: 95,,, | Performed by: STUDENT IN AN ORGANIZED HEALTH CARE EDUCATION/TRAINING PROGRAM

## 2023-03-17 RX ORDER — FLUOXETINE HYDROCHLORIDE 20 MG/1
20 CAPSULE ORAL DAILY
Qty: 30 CAPSULE | Refills: 3 | Status: SHIPPED | OUTPATIENT
Start: 2023-03-17 | End: 2023-06-22 | Stop reason: SDUPTHER

## 2023-03-17 RX ORDER — BUPROPION HYDROCHLORIDE 150 MG/1
150 TABLET ORAL DAILY
Qty: 30 TABLET | Refills: 3 | Status: SHIPPED | OUTPATIENT
Start: 2023-03-17 | End: 2023-06-22

## 2023-03-17 RX ORDER — ALPRAZOLAM 0.5 MG/1
0.5 TABLET ORAL DAILY PRN
Qty: 10 TABLET | Refills: 0 | Status: SHIPPED | OUTPATIENT
Start: 2023-03-17 | End: 2023-06-22 | Stop reason: SDUPTHER

## 2023-03-17 RX ORDER — BUSPIRONE HYDROCHLORIDE 10 MG/1
TABLET ORAL
Qty: 60 TABLET | Refills: 3 | Status: SHIPPED | OUTPATIENT
Start: 2023-03-17 | End: 2023-07-17

## 2023-03-17 NOTE — PROGRESS NOTES
OUTPATIENT PSYCHIATRY FOLLOW UP VISIT     ENCOUNTER DATE:  6/29/22  SITE:  [facility]  LENGTH OF SESSION:  15 minutes     TELE PSYCHIATRY Disclaimer   *The patient was informed despite using HIPPA compliant technology there may be risks including security breach, technological failure, inability to perform a comprehensive physical exam which could delay or prevent an accurate diagnosis, and potential complications from treatment decisions rendered over a telemedical platform. The patient understands and consented to the use of tele-health service as being a safe measure to mitigate during COVID 19 Pandemic .  The patient was also informed of the relationship between the physician and patient and the respective role of any other health care provider with respect to management of the patient; and notified that the pt may decline to receive medical services by telemedicine and may withdraw from such care at any time.          Patient's Current location: [facility]     In Case of Emergency pts next of kin  Name: Aaron Rich ()  Phone number:   [phone]  Visit type: Virtual visit with synchronous audio and video  Total time spent with patient: 15 minutes     CHIEF COMPLAINT:  MDD, in remission   LUTHER with panic attacks   R/o OCD        HISTORY OF PRESENTING ILLNESS:  Linda Rich is a 32 y.o. female with history of depression, anxiety who presents for follow up appointment. Patient was last seen for appointment in this clinic three months ago, during which no changes were made to the medication regimen.     Per last note, current psychotropic medications included:  - Buspar 30mg PO BID.     - Prozac 20 mg daily.  Consider titration to 40mg at next appt.    - Wellbutrin  mg daily for mood and sexual dysfunction augmentation of prozac  - Xanax 0.5 mg PO daily PRN for panic attacks         Interval history as told by Patient      Pt reports that she  is doing well on current regimen. Struggles with intrusive and undesired sexual thoughts for the past year, this has been something she has been working through in therapy. While this initially caused conflict in her marriage, pt states she is managing these thoughts better and feels less distressed when they arise. No other ocd symptoms (no checking, hygeine, counting, organizing, etc). Med compliant, no s/e. Denies si/hi/avh.  Anxiety and mood well controlled. Finds meds helpful. Pt typically uses Xanax to abort panic attacks, which remain rare. Uses Xanax about once per month. Patient has been slowly tapering Buspar. She is now taking 15 mg BID. She feels that it has not been particularly helpful for her anxiety and would like to try discontinuing it.        Psych ROS: denies    Stressors: dog with medical issues    Substance: NO tobacco, no smoking, no vape, no alcohol       PSYCHIATRIC REVIEW OF SYSTEMS:  (none/ yes- better/worse/stable/& what symptoms)     Symptoms of Depression: stable, in remission    Symptoms of Anxiety/ panic attacks: improved.  Mild anxiety attack once/month    Symptoms of Daisha/Hypomania: no  Symptoms of psychosis: no  PANSS Positive /Negative  Sleep: no  Appetite: no  OCD: improved  Alcohol: no    Illicit Drugs: no  Psychosocial stressors: no     Risk Parameters:  Patient reports no suicidal ideation  Patient reports no homicidal ideation  Patient reports no self-injurious behavior  Patient reports no violent behavior     PSYCHIATRIC MED REVIEW     Current psych meds  Medication side effects:  Sexual dysfunction on prozac.  Improved with wellbutrin augmentation.    Medication compliance:  yes     Medical Hx:  Dx: denies  Meds: oral birth control  Allx: Cefdinir    Psychiatric Hx:  Diagnosis: Depressive disorder, panic disorder, LUTHER  Previous medication trials:  Antidepressants  - Wellbutrin: helpful for sexual dysfunction from antidepressant, reports dysphoria at higher doses  -  Prozac  - Buspar: somewhat helpful for anxiety but not dramatic  Hospitalizations: No  Suicide attempt: No    Substance use: No  Alcohol use: Yes - rare  Rehab: No    Social Hx:  with no children. Lives with . Works as a . Has Bachelors.    Family Hx:  Mother: anxiety       PAST PSYCHIATRIC, MEDICAL, AND SOCIAL HISTORY REVIEWED  The patient's past medical, family and social history have been reviewed and updated as appropriate within the electronic medical record - see encounter notes.     MEDICAL REVIEW OF SYSTEMS:  Complete review of systems performed covering Constitutional, Musculoskeletal, Neurologic.  All systems negative/except as mentioned in HPI.       ALL MEDICATIONS:     Current Outpatient Medications:     ALPRAZolam (XANAX) 0.5 MG tablet, Take 1 tablet (0.5 mg total) by mouth daily as needed for Anxiety., Disp: 15 tablet, Rfl: 0    buPROPion (WELLBUTRIN XL) 150 MG TB24 tablet, Take 1 tablet (150 mg total) by mouth once daily., Disp: 90 tablet, Rfl: 2    busPIRone (BUSPAR) 15 MG tablet, Take 1 tablet (15 mg total) by mouth 2 (two) times daily., Disp: 60 tablet, Rfl: 2    ferrous sulfate, dried (SLOW FE) 160 mg (50 mg iron) TbSR, Take 1 tablet (160 mg total) by mouth once daily., Disp: 30 tablet, Rfl: 3    FLUoxetine 20 MG capsule, Take 1 capsule (20 mg total) by mouth once daily., Disp: 30 capsule, Rfl: 11    FLUoxetine 20 MG capsule, Take 1 capsule (20 mg total) by mouth once daily., Disp: 90 capsule, Rfl: 2    ibuprofen (ADVIL,MOTRIN) 600 MG tablet, Take 1 tablet (600 mg total) by mouth daily as needed for Pain (headache)., Disp: 30 tablet, Rfl: 0    norgestimate-ethinyl estradiol (ORTHO TRI-CYCLEN LO) 0.18/0.215/0.25 mg-25 mcg tablet, Take 1 tablet by mouth once daily., Disp: 30 tablet, Rfl: 11     ALLERGIES:          Review of patient's allergies indicates:   Allergen Reactions    Cefdinir Diarrhea         RELEVANT LABS/STUDIES:              Lab Results   Component  Value Date     WBC 4.9 07/02/2020     HGB 12.1 07/02/2020     HCT 37.8 07/02/2020     MCV 92.4 07/02/2020      07/02/2020      BMP            Lab Results   Component Value Date      07/02/2020     K 4.3 07/02/2020      07/02/2020     CO2 26 07/02/2020     BUN 9 07/02/2020     CREATININE 0.73 07/02/2020     CALCIUM 8.8 07/02/2020     ANIONGAP 8 02/26/2018     ESTGFRAFRICA 130 07/02/2020     EGFRNONAA 112 07/02/2020                Lab Results   Component Value Date     ALT 10 07/02/2020     AST 12 07/02/2020     ALKPHOS 56 07/02/2020     BILITOT 0.3 07/02/2020                Lab Results   Component Value Date     TSH 4.35 07/02/2020      No results found for: LABA1C, HGBA1C     VITALS  There were no vitals filed for this visit.     PHYSICAL EXAM  General: well developed, well nourished    Neurologic:   Gait: unable to assess   Psychomotor signs:  No involuntary movements or tremor  AIMS: none     PSYCHIATRIC EXAM:     Mental Status Exam:  Appearance: unremarkable, age appropriate  Behavior/Cooperation: normal, cooperative  Speech: normal tone, normal rate, normal pitch, normal volume  Language: uses words appropriately; NO aphasia or dysarthria  Mood: euthymic  Affect: mood congruent  Thought Process: normal and logical  Thought Content: normal, no suicidality, no homicidality, delusions, or paranoia  Level of Consciousness: Alert and Oriented x3  Memory:  Intact  Attention/concentration: appropriate for age/education.   Fund of Knowledge: appears adequate  Insight: has awareness of illness  Judgment: behavior appropriate to situation         IMPRESSION:    Linda Rich is a 32 y.o. female with history of depression, anxiety who presents for follow up appointment for MM.    Symptoms well controlled, overall stable over the last 6 months.  Continuing treatment as documented.       Status/Progress:  Based on the examination today, the patient's problem(s) is/are adequately controlled.  New  problems have not been presented today.     DIAGNOSES:  MDD, in remission   LUTHER with panic attacks   Panic disorder  R/o OCD     PLAN:  Psych Med:     Treatment Plan/Recommendations:    - Continue to taper Buspar to 10 mg BID, can further decrease to 5 mg if tolerated after two weeks; discussed taper schedule with patient    - Continue Prozac 20 mg daily.  Consider titration to 40mg if needed  - Continue Wellbutrin  mg daily for mood and sexual dysfunction augmentation of prozac   Discussed BP monitoring over time and s/e including lowered seizure threshold.    - Continue Xanax 0.5 mg PO daily PRN for panic attacks   - PDMP reviewed: patient filling all medications on time, no abnormalities noted    - Last refill picked up on 12/23/22   - Provided with one refills, starting on 3/17/23 , 10 pills       - Continue with psychotherapy  - RTC 6-8 weeks     Medication Management: Discussed with patient informed consent including diagnosis, risks and benefits of proposed treatment above vs. alternative treatments vs. no treatment, as well as serious and common side effects of these treatments, and the inherent unpredictability of individual responses to these treatments. The patient expresses understanding of the above and displays the capacity to agree with this current plan. Patient also agrees that, currently, the benefits outweigh the risks and would like to pursue treatment at this time, and had no other questions.        Patient instructions:   Keep future appointments and take medications as prescribed.   Abstain from substance use.   In the event of an emergency, including suicidal ideation, patient was advised to go to the nearest emergency room or call 911.  Contact with questions. Patient aware that office/EMR communications are for non-emergent issues only and may take up to several days for response         RETURN TO CLINIC:  6-8 weeks or sooner if needed.       Case to be discussed with supervising  staff, MD Villa Leal MD  [facility], PGY-III

## 2023-04-28 ENCOUNTER — PATIENT MESSAGE (OUTPATIENT)
Dept: PSYCHIATRY | Facility: CLINIC | Age: 33
End: 2023-04-28
Payer: COMMERCIAL

## 2023-05-18 ENCOUNTER — PATIENT MESSAGE (OUTPATIENT)
Dept: PSYCHIATRY | Facility: CLINIC | Age: 33
End: 2023-05-18
Payer: COMMERCIAL

## 2023-06-22 ENCOUNTER — OFFICE VISIT (OUTPATIENT)
Dept: PSYCHIATRY | Facility: CLINIC | Age: 33
End: 2023-06-22
Payer: COMMERCIAL

## 2023-06-22 VITALS
BODY MASS INDEX: 26.31 KG/M2 | HEART RATE: 87 BPM | DIASTOLIC BLOOD PRESSURE: 62 MMHG | WEIGHT: 134.69 LBS | SYSTOLIC BLOOD PRESSURE: 111 MMHG

## 2023-06-22 DIAGNOSIS — F41.1 GAD (GENERALIZED ANXIETY DISORDER): ICD-10-CM

## 2023-06-22 DIAGNOSIS — F41.0 PANIC DISORDER WITHOUT AGORAPHOBIA WITH MILD PANIC ATTACKS: ICD-10-CM

## 2023-06-22 DIAGNOSIS — F90.0 ADHD (ATTENTION DEFICIT HYPERACTIVITY DISORDER), INATTENTIVE TYPE: ICD-10-CM

## 2023-06-22 DIAGNOSIS — F32.5 MAJOR DEPRESSIVE DISORDER IN FULL REMISSION, UNSPECIFIED WHETHER RECURRENT: Primary | ICD-10-CM

## 2023-06-22 PROCEDURE — 3078F PR MOST RECENT DIASTOLIC BLOOD PRESSURE < 80 MM HG: ICD-10-PCS | Mod: CPTII,S$GLB,, | Performed by: STUDENT IN AN ORGANIZED HEALTH CARE EDUCATION/TRAINING PROGRAM

## 2023-06-22 PROCEDURE — 3008F PR BODY MASS INDEX (BMI) DOCUMENTED: ICD-10-PCS | Mod: CPTII,S$GLB,, | Performed by: STUDENT IN AN ORGANIZED HEALTH CARE EDUCATION/TRAINING PROGRAM

## 2023-06-22 PROCEDURE — 3078F DIAST BP <80 MM HG: CPT | Mod: CPTII,S$GLB,, | Performed by: STUDENT IN AN ORGANIZED HEALTH CARE EDUCATION/TRAINING PROGRAM

## 2023-06-22 PROCEDURE — 3074F SYST BP LT 130 MM HG: CPT | Mod: CPTII,S$GLB,, | Performed by: STUDENT IN AN ORGANIZED HEALTH CARE EDUCATION/TRAINING PROGRAM

## 2023-06-22 PROCEDURE — 99999 PR PBB SHADOW E&M-EST. PATIENT-LVL II: CPT | Mod: PBBFAC,,, | Performed by: STUDENT IN AN ORGANIZED HEALTH CARE EDUCATION/TRAINING PROGRAM

## 2023-06-22 PROCEDURE — 3008F BODY MASS INDEX DOCD: CPT | Mod: CPTII,S$GLB,, | Performed by: STUDENT IN AN ORGANIZED HEALTH CARE EDUCATION/TRAINING PROGRAM

## 2023-06-22 PROCEDURE — 99214 OFFICE O/P EST MOD 30 MIN: CPT | Mod: S$GLB,,, | Performed by: STUDENT IN AN ORGANIZED HEALTH CARE EDUCATION/TRAINING PROGRAM

## 2023-06-22 PROCEDURE — 99214 PR OFFICE/OUTPT VISIT, EST, LEVL IV, 30-39 MIN: ICD-10-PCS | Mod: S$GLB,,, | Performed by: STUDENT IN AN ORGANIZED HEALTH CARE EDUCATION/TRAINING PROGRAM

## 2023-06-22 PROCEDURE — 3074F PR MOST RECENT SYSTOLIC BLOOD PRESSURE < 130 MM HG: ICD-10-PCS | Mod: CPTII,S$GLB,, | Performed by: STUDENT IN AN ORGANIZED HEALTH CARE EDUCATION/TRAINING PROGRAM

## 2023-06-22 PROCEDURE — 99999 PR PBB SHADOW E&M-EST. PATIENT-LVL II: ICD-10-PCS | Mod: PBBFAC,,, | Performed by: STUDENT IN AN ORGANIZED HEALTH CARE EDUCATION/TRAINING PROGRAM

## 2023-06-22 RX ORDER — FLUOXETINE HYDROCHLORIDE 20 MG/1
20 CAPSULE ORAL DAILY
Qty: 30 CAPSULE | Refills: 3 | Status: SHIPPED | OUTPATIENT
Start: 2023-06-22 | End: 2023-07-17 | Stop reason: SDUPTHER

## 2023-06-22 RX ORDER — BUPROPION HYDROCHLORIDE 300 MG/1
300 TABLET ORAL DAILY
Qty: 30 TABLET | Refills: 2 | Status: SHIPPED | OUTPATIENT
Start: 2023-06-22 | End: 2023-07-17

## 2023-06-22 RX ORDER — ALPRAZOLAM 0.5 MG/1
0.5 TABLET ORAL DAILY PRN
Qty: 10 TABLET | Refills: 0 | Status: SHIPPED | OUTPATIENT
Start: 2023-06-22 | End: 2024-01-04 | Stop reason: SDUPTHER

## 2023-06-22 NOTE — PROGRESS NOTES
"OUTPATIENT PSYCHIATRY FOLLOW UP VISIT     ENCOUNTER DATE:  6/29/22  SITE:  [facility]  LENGTH OF SESSION:  35 minutes     CHIEF COMPLAINT:  MDD, in remission   LUTHER with panic attacks   R/o OCD        HISTORY OF PRESENTING ILLNESS:  Linda Rich is a 32 y.o. female with history of depression, anxiety who presents for follow up appointment. Patient was last seen for appointment in this clinic three months ago, during which  buspirone was tapered .     Per last note, current psychotropic medications included:  - Prozac 20 mg daily  - Wellbutrin  mg daily for mood and sexual dysfunction augmentation of prozac  - Xanax 0.5 mg PO daily PRN for panic attacks         Interval history as told by Patient      Pt reports that she is doing well on current regimen. Presenting today expressing concern about undiagnosed ADHD. States that she has begun to think this due to poor organizational skills in work and homelife. Pt says she frequently feels overwhelmed by her tasks, that often leads to panic attacks. At home she reports that her home is a mess and she can't keep up with the housework. At work she is easily distracted and has difficulty returning to previous tasks. Reports often losing things, including important paperwork at her job, and frequently forgets appointments and meetings. Pt states this has become more of a concern lately as she contemplates having children, stating "I can't even run my own life." She denies any recent changes in her work responsibilities. 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 As a child, she reports that her grades were "decent" and that she struggled with certain subjects. She would often forget books at school and have to return to get them. She also reports often forgetting when tests were scheduled. She had episodes in which she would hyperfocus on her " "tasks and would not listen when others were talking to her. She states that she often felt that she was different from other children. Pt also has concern for ASD, with report of anxiety related to changes in routine and sensitivity to some sounds and textures.     No further struggles with intrusive and undesired sexual thoughts. No other ocd symptoms (no checking, hygeine, counting, organizing, etc). Now completed therapy. Anxiety tends to be related to her poor organization at this time. It has not worsened since discontinuing Buspar. Med compliant, no s/e. Denies si/hi/avh.  Anxiety and mood well controlled. Finds meds helpful. Pt typically uses Xanax to abort panic attacks, which remain rare. Uses Xanax about once per month.     Psych ROS: denies    Stressors: dog with medical issues    Substance: NO tobacco, no smoking, no vape, no alcohol    Adult ADHD Self-Report Scale    PART A: Screening  How often do you have trouble wrapping up the final details of a project,  once the challenging parts have been done? Sometimes   How often do you have difficulty getting things in order when you have to do  a task that requires organization? Very often   How often do you have problems remembering appointments or obligations? Very often   Score number of items more frequent than "Rarely" 3   When you have a task that requires a lot of thought, how often do you avoid  or delay getting started? Very often   How often do you fidget or squirm with your hands or feet when you have  to sit down for a long time? Very often   How often do you feel overly active and compelled to do things, like you  were driven by a motor? Never   Score number of items more frequent than "Sometimes" 2   Total score (>4 indicative of ADHD) 5     PART B: Additional symptoms, no score  How often do you make careless mistakes when you have to work on a boring or  difficult project? Often   How often do you have difficulty keeping your attention when " you are doing boring  or repetitive work? Often   How often do you have difficulty concentrating on what people say to you,  even when they are speaking to you directly? Rarely   How often do you misplace or have difficulty finding things at home or at work? Very often   How often are you distracted by activity or noise around you? Often   How often do you leave your seat in meetings or other situations in which  you are expected to remain seated? Rarely   How often do you feel restless or fidgety? Often   How often do you have difficulty unwinding and relaxing when you have time  to yourself? Often   How often do you find yourself talking too much when you are in social situations? Often   When youre in a conversation, how often do you find yourself finishing  the sentences of the people you are talking to, before they can finish  them themselves? Very often   How often do you have difficulty waiting your turn in situations when  turn taking is required? Sometimes   How often do you interrupt others when they are busy? Often     Note: can consider DIVA 2.0 for more specificity         PSYCHIATRIC REVIEW OF SYSTEMS:  (none/ yes- better/worse/stable/& what symptoms)     Symptoms of Depression: stable, in remission    Symptoms of Anxiety/ panic attacks: improved.  Mild anxiety attack once/month    Symptoms of Daisha/Hypomania: no  Symptoms of psychosis: no  PANSS Positive /Negative  Sleep: no  Appetite: no  OCD: improved  Alcohol: no    Illicit Drugs: no  Psychosocial stressors: no     Risk Parameters:  Patient reports no suicidal ideation  Patient reports no homicidal ideation  Patient reports no self-injurious behavior  Patient reports no violent behavior     PSYCHIATRIC MED REVIEW     Current psych meds  Medication side effects:  Sexual dysfunction on prozac.  Improved with wellbutrin augmentation.    Medication compliance:  yes     Medical Hx:  Dx: denies  Meds: oral birth control  Allx: Cefdinir    Psychiatric  Hx:  Diagnosis: Depressive disorder, panic disorder, LUTHER  Previous medication trials:  Antidepressants  - Wellbutrin: helpful for sexual dysfunction from antidepressant, reports dysphoria at higher doses  - Prozac  - Buspar: somewhat helpful for anxiety but not dramatic  Hospitalizations: No  Suicide attempt: No    Substance use: No  Alcohol use: Yes - rare  Rehab: No    Social Hx:  with no children. Lives with . Works as a . Has Bachelors.    Family Hx:  Mother: anxiety       PAST PSYCHIATRIC, MEDICAL, AND SOCIAL HISTORY REVIEWED  The patient's past medical, family and social history have been reviewed and updated as appropriate within the electronic medical record - see encounter notes.     MEDICAL REVIEW OF SYSTEMS:  Complete review of systems performed covering Constitutional, Musculoskeletal, Neurologic.  All systems negative/except as mentioned in HPI.       ALL MEDICATIONS:     Current Outpatient Medications:     ALPRAZolam (XANAX) 0.5 MG tablet, Take 1 tablet (0.5 mg total) by mouth daily as needed for Anxiety., Disp: 15 tablet, Rfl: 0    buPROPion (WELLBUTRIN XL) 150 MG TB24 tablet, Take 1 tablet (150 mg total) by mouth once daily., Disp: 90 tablet, Rfl: 2    busPIRone (BUSPAR) 15 MG tablet, Take 1 tablet (15 mg total) by mouth 2 (two) times daily., Disp: 60 tablet, Rfl: 2    ferrous sulfate, dried (SLOW FE) 160 mg (50 mg iron) TbSR, Take 1 tablet (160 mg total) by mouth once daily., Disp: 30 tablet, Rfl: 3    FLUoxetine 20 MG capsule, Take 1 capsule (20 mg total) by mouth once daily., Disp: 30 capsule, Rfl: 11    FLUoxetine 20 MG capsule, Take 1 capsule (20 mg total) by mouth once daily., Disp: 90 capsule, Rfl: 2    ibuprofen (ADVIL,MOTRIN) 600 MG tablet, Take 1 tablet (600 mg total) by mouth daily as needed for Pain (headache)., Disp: 30 tablet, Rfl: 0    norgestimate-ethinyl estradiol (ORTHO TRI-CYCLEN LO) 0.18/0.215/0.25 mg-25 mcg tablet, Take 1 tablet by mouth once  daily., Disp: 30 tablet, Rfl: 11     ALLERGIES:          Review of patient's allergies indicates:   Allergen Reactions    Cefdinir Diarrhea         RELEVANT LABS/STUDIES:              Lab Results   Component Value Date     WBC 4.9 07/02/2020     HGB 12.1 07/02/2020     HCT 37.8 07/02/2020     MCV 92.4 07/02/2020      07/02/2020      BMP            Lab Results   Component Value Date      07/02/2020     K 4.3 07/02/2020      07/02/2020     CO2 26 07/02/2020     BUN 9 07/02/2020     CREATININE 0.73 07/02/2020     CALCIUM 8.8 07/02/2020     ANIONGAP 8 02/26/2018     ESTGFRAFRICA 130 07/02/2020     EGFRNONAA 112 07/02/2020                Lab Results   Component Value Date     ALT 10 07/02/2020     AST 12 07/02/2020     ALKPHOS 56 07/02/2020     BILITOT 0.3 07/02/2020                Lab Results   Component Value Date     TSH 4.35 07/02/2020      No results found for: LABA1C, HGBA1C     VITALS  Vitals:    06/22/23 0947   BP: 111/62   Pulse: 87        PHYSICAL EXAM  General: well developed, well nourished    Neurologic:   Gait: normal gait  Psychomotor signs:  No involuntary movements or tremor  AIMS: none     PSYCHIATRIC EXAM:     Mental Status Exam:  Appearance: unremarkable, age appropriate  Behavior/Cooperation: normal, cooperative  Speech: normal tone, normal rate, normal pitch, normal volume  Language: uses words appropriately; NO aphasia or dysarthria  Mood: euthymic  Affect: mood congruent  Thought Process: normal and logical  Thought Content: normal, no suicidality, no homicidality, delusions, or paranoia  Level of Consciousness: Alert and Oriented x3  Memory:  Intact  Attention/concentration: appropriate for age/education.   Fund of Knowledge: appears adequate  Insight: has awareness of illness  Judgment: behavior appropriate to situation         IMPRESSION:    Linda Rich is a 32 y.o. female with history of depression, anxiety who presents for follow up appointment for MM.    Symptoms  well controlled, overall stable over the last 6 months.  Continuing treatment as documented.       Status/Progress:  Based on the examination today, the patient's problem(s) is/are adequately controlled.  New problems have been presented today.     DIAGNOSES:  MDD, in remission   LUTHER with panic attacks   Panic disorder  ADHD, inattentive type  R/o ASD  R/o OCD     PLAN:  Psych Med:     Treatment Plan/Recommendations:    - Continue Prozac 20 mg daily.  Consider titration to 40mg if needed  - Increase Wellbutrin XL to 300 mg daily for mood, sexual dysfunction, and ADHD symptoms. Discussed BP monitoring over time and s/e including lowered seizure threshold.    - Continue Xanax 0.5 mg PO daily PRN for panic attacks   - PDMP reviewed: patient filling all medications on time, no abnormalities noted    - Last refill picked up on 3/26/23   - Provided with one refills, starting on 6/22/23 , 10 pills       - Continue with psychotherapy    Medication Management: Discussed with patient informed consent including diagnosis, risks and benefits of proposed treatment above vs. alternative treatments vs. no treatment, as well as serious and common side effects of these treatments, and the inherent unpredictability of individual responses to these treatments. The patient expresses understanding of the above and displays the capacity to agree with this current plan. Patient also agrees that, currently, the benefits outweigh the risks and would like to pursue treatment at this time, and had no other questions.        Patient instructions:   Keep future appointments and take medications as prescribed.   Abstain from substance use.   In the event of an emergency, including suicidal ideation, patient was advised to go to the nearest emergency room or call 911.  Contact with questions. Patient aware that office/EMR communications are for non-emergent issues only and may take up to several days for response         RETURN TO CLINIC:  6-8  weeks or sooner if needed.       Case to be discussed with supervising staff, MD Villa Leal MD  [facility], PGY-III

## 2023-07-17 ENCOUNTER — OFFICE VISIT (OUTPATIENT)
Dept: PSYCHIATRY | Facility: CLINIC | Age: 33
End: 2023-07-17
Payer: COMMERCIAL

## 2023-07-17 VITALS
SYSTOLIC BLOOD PRESSURE: 111 MMHG | WEIGHT: 137.13 LBS | HEART RATE: 88 BPM | DIASTOLIC BLOOD PRESSURE: 68 MMHG | BODY MASS INDEX: 26.78 KG/M2

## 2023-07-17 DIAGNOSIS — F41.1 GAD (GENERALIZED ANXIETY DISORDER): ICD-10-CM

## 2023-07-17 DIAGNOSIS — F90.0 ADHD (ATTENTION DEFICIT HYPERACTIVITY DISORDER), INATTENTIVE TYPE: ICD-10-CM

## 2023-07-17 DIAGNOSIS — F41.0 PANIC DISORDER WITHOUT AGORAPHOBIA WITH MILD PANIC ATTACKS: ICD-10-CM

## 2023-07-17 DIAGNOSIS — F32.5 MAJOR DEPRESSIVE DISORDER IN FULL REMISSION, UNSPECIFIED WHETHER RECURRENT: Primary | ICD-10-CM

## 2023-07-17 PROCEDURE — 99999 PR PBB SHADOW E&M-EST. PATIENT-LVL II: CPT | Mod: PBBFAC,,, | Performed by: STUDENT IN AN ORGANIZED HEALTH CARE EDUCATION/TRAINING PROGRAM

## 2023-07-17 PROCEDURE — 99999 PR PBB SHADOW E&M-EST. PATIENT-LVL II: ICD-10-PCS | Mod: PBBFAC,,, | Performed by: STUDENT IN AN ORGANIZED HEALTH CARE EDUCATION/TRAINING PROGRAM

## 2023-07-17 RX ORDER — FLUOXETINE HYDROCHLORIDE 20 MG/1
20 CAPSULE ORAL DAILY
Qty: 30 CAPSULE | Refills: 3 | Status: SHIPPED | OUTPATIENT
Start: 2023-07-17 | End: 2023-11-14

## 2023-07-17 RX ORDER — BUPROPION HYDROCHLORIDE 150 MG/1
150 TABLET ORAL DAILY
Qty: 30 TABLET | Refills: 2 | Status: SHIPPED | OUTPATIENT
Start: 2023-07-17 | End: 2024-01-04 | Stop reason: SDUPTHER

## 2023-07-17 NOTE — PROGRESS NOTES
"Outpatient Psychiatry Follow-Up Visit (MD/NP)    7/17/2023    Clinical Status of Patient:  Outpatient (Ambulatory)    Chief Complaint:  Linda Rich is a 33 y.o. female who presents today for follow-up of  MDD, in remission   LUTHER with panic attacks, r/o OCD. Met with patient.      Interval History and Content of Current Session:  Interim Events/Subjective Report/Content of Current Session:     Patient reports an 8 year h/o anxiety, depression currently on Prozac, Wellbutrin (both for several years) which has adequately managed her anxiety, depression for the most part. She describes her anxiety as a feeling of overwhelm with keeping her house clean and organized, as well as keeping up with the demands of her work as a . 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 She states that ~2x/month she has severe anxiety when she gets home from work that is bottled up tension from home and work (states during these episodes she cries a lot). She has been taking a half tablet of Xanax for this (only takes ~2 half tablets monthly or less; last used 2 weeks ago). Pt states that during the school year she oversleeps and is constantly fatigued throughout the day and when she gets home from work. States she doesn't have the energy to do anything outside of work other than walk her dog. Current coping skills include: naps, breathing (holding breath until need to exhale; taught by therapist).    Pt reports that her and Dr. Zhang talked at length about her underlying ADHD symptoms that may be leading to her anxiety. She reports these sxs at work and at home and states she frequently feels "out of control and anxious"; also notes that they were apparent during her childhood (states her backpack and school desk was always messy, she was frequently late, had difficulty paying attention and completing her school " "assignments which caused significant anxiety). 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 Current coping skills: putting alarm clock in bathroom to force herself up, preparing clothes and food the night before the work day.     At that time, the patient tried increasing her dose of Wellbutrin from 150 mg to 300 mg, however after a week of taking it she could not tolerate the side effects including increased anxiety, increased sensitivity to noise, and GI upset (nausea).     Last saw therapist 3/2023 (been seeing her on and off for couple years); reports only some improvement in her sxs.     Patient also reports a long history of feeling a sense of "outsideness" (states she feels like she isn't as incorporating in society as other people are which causes her to have less "closeness" to others). States this is non-distressing however.     x 7 years, good relationship. Family lives close by and pt is close with mother, in-laws.     Routine: very fixed and needs to know plans in advance prior to event.       Review of Systems   PSYCHIATRIC: Pertinant items are noted in the narrative.    Past Medical, Family and Social History: The patient's past medical, family and social history have been reviewed and updated as appropriate within the electronic medical record - see encounter notes.    Compliance: yes    Side effects: GI upset, irritability, anxiety    Risk Parameters:  Patient reports no suicidal ideation  Patient reports no homicidal ideation  Patient reports no self-injurious behavior  Patient reports no violent behavior    Exam (detailed: at least 9 elements; comprehensive: all 15 elements)   Constitutional  Vitals:  Most recent vital signs, dated less than 90 days prior to this appointment, were reviewed.   There were no vitals filed for this visit.     General:  unremarkable, age appropriate     Musculoskeletal  Muscle " Strength/Tone:  not examined   Gait & Station:  non-ataxic     Psychiatric  Speech:  no latency; no press   Mood & Affect:  euthymic  congruent and appropriate   Thought Process:  normal and logical   Associations:  intact   Thought Content:  normal, no suicidality, no homicidality, delusions, or paranoia   Insight:  intact, has awareness of illness   Judgement: behavior is adequate to circumstances   Orientation:  person, place, situation, time/date   Memory: intact for content of interview, memory >3 objects at five mins, able to remember recent events- Yes, able to remember remote events- Yes   Language: grossly intact   Attention Span & Concentration:  able to focus, completed tasks   Fund of Knowledge:  intact and appropriate to age and level of education, 4 of 4 recent presidents     Assessment and Diagnosis   Status/Progress: Based on the examination today, the patient's problem(s) is/are adequately but not ideally controlled.  New problems have not been presented today.   Co-morbidities are not complicating management of the primary condition.  The working differential for this patient includes LUTHER, OCD, ASD.     General Impression:     R/o ASD  R/o OCD    ICD-10-CM ICD-9-CM   1. Major depressive disorder in full remission, unspecified whether recurrent  F32.5 296.26   2. LUTHER (generalized anxiety disorder)  F41.1 300.02   3. Panic disorder without agoraphobia with mild panic attacks  F41.0 300.01   4. ADHD (attention deficit hyperactivity disorder), inattentive type  F90.0 314.00       Intervention/Counseling/Treatment Plan   Continue Prozac 20 mg daily. Consider increasing 40 mg in future.   Decrease Wellbutrin XL to 150 mg daily for mood, sexual dysfunction, and ADHD symptoms (intolerable side effect of increased anxiety, sensitivity to loud noises, GI upset)  Continue Xanax 0.5 mg PO daily PRN for panic attacks  PDMP reviewed: patient filling all medications on time, no abnormalities noted   Last refill  picked up on 6/22/23  Continue with psychotherapy    Medication Management: Discussed with patient informed consent including diagnosis, risks and benefits of proposed treatment above vs. alternative treatments vs. no treatment, as well as serious and common side effects of these treatments, and the inherent unpredictability of individual responses to these treatments. The patient expresses understanding of the above and displays the capacity to agree with this current plan. Patient also agrees that, currently, the benefits outweigh the risks and would like to pursue treatment at this time, and had no other questions.        Patient instructions:   Keep future appointments and take medications as prescribed.   Abstain from substance use.   In the event of an emergency, including suicidal ideation, patient was advised to go to the nearest emergency room or call 911.  Contact with questions. Patient aware that office/EMR communications are for non-emergent issues only and may take up to several days for response       Return to Clinic: 6-8 weeks    Case discussed with staff: Dr. Kurtis Silva DO, [facility], PGY-3

## 2023-10-14 ENCOUNTER — OFFICE VISIT (OUTPATIENT)
Dept: URGENT CARE | Facility: CLINIC | Age: 33
End: 2023-10-14
Payer: COMMERCIAL

## 2023-10-14 VITALS
SYSTOLIC BLOOD PRESSURE: 107 MMHG | TEMPERATURE: 99 F | DIASTOLIC BLOOD PRESSURE: 74 MMHG | RESPIRATION RATE: 17 BRPM | OXYGEN SATURATION: 97 % | HEIGHT: 63 IN | BODY MASS INDEX: 24.27 KG/M2 | WEIGHT: 137 LBS | HEART RATE: 90 BPM

## 2023-10-14 DIAGNOSIS — J11.1 INFLUENZA: Primary | ICD-10-CM

## 2023-10-14 LAB
CTP QC/QA: YES
MOLECULAR STREP A: NEGATIVE
POC MOLECULAR INFLUENZA A AGN: POSITIVE
POC MOLECULAR INFLUENZA B AGN: NEGATIVE
SARS-COV-2 AG RESP QL IA.RAPID: NEGATIVE

## 2023-10-14 PROCEDURE — 87811 SARS CORONAVIRUS 2 ANTIGEN POCT, MANUAL READ: ICD-10-PCS | Mod: QW,S$GLB,, | Performed by: FAMILY MEDICINE

## 2023-10-14 PROCEDURE — 87811 SARS-COV-2 COVID19 W/OPTIC: CPT | Mod: QW,S$GLB,, | Performed by: FAMILY MEDICINE

## 2023-10-14 PROCEDURE — 87502 INFLUENZA DNA AMP PROBE: CPT | Mod: QW,S$GLB,, | Performed by: FAMILY MEDICINE

## 2023-10-14 PROCEDURE — 87651 STREP A DNA AMP PROBE: CPT | Mod: QW,S$GLB,, | Performed by: FAMILY MEDICINE

## 2023-10-14 PROCEDURE — 99213 OFFICE O/P EST LOW 20 MIN: CPT | Mod: S$GLB,,, | Performed by: FAMILY MEDICINE

## 2023-10-14 PROCEDURE — 99213 PR OFFICE/OUTPT VISIT, EST, LEVL III, 20-29 MIN: ICD-10-PCS | Mod: S$GLB,,, | Performed by: FAMILY MEDICINE

## 2023-10-14 PROCEDURE — 87502 POCT INFLUENZA A/B MOLECULAR: ICD-10-PCS | Mod: QW,S$GLB,, | Performed by: FAMILY MEDICINE

## 2023-10-14 PROCEDURE — 87651 POCT STREP A MOLECULAR: ICD-10-PCS | Mod: QW,S$GLB,, | Performed by: FAMILY MEDICINE

## 2023-10-14 RX ORDER — FLUOXETINE 20 MG/1
20 TABLET ORAL
COMMUNITY
End: 2024-01-04 | Stop reason: SDUPTHER

## 2023-10-14 RX ORDER — PROMETHAZINE HYDROCHLORIDE AND DEXTROMETHORPHAN HYDROBROMIDE 6.25; 15 MG/5ML; MG/5ML
5 SYRUP ORAL NIGHTLY PRN
Qty: 118 ML | Refills: 0 | Status: SHIPPED | OUTPATIENT
Start: 2023-10-14 | End: 2023-10-24

## 2023-10-14 RX ORDER — BENZONATATE 100 MG/1
100 CAPSULE ORAL EVERY 6 HOURS PRN
Qty: 30 CAPSULE | Refills: 1 | Status: SHIPPED | OUTPATIENT
Start: 2023-10-14 | End: 2024-10-13

## 2023-10-14 NOTE — PROGRESS NOTES
"Subjective:      Patient ID: Linda Rich is a 33 y.o. female.    Vitals:  height is 5' 3" (1.6 m) and weight is 62.1 kg (137 lb). Her temperature is 98.5 °F (36.9 °C). Her blood pressure is 107/74 and her pulse is 90. Her respiration is 17 and oxygen saturation is 97%.     Chief Complaint: Sore Throat    This is a 33 y.o. female who presents today with a chief complaint of sore throat onset 4 days. Pt also has postnasal drip and a cough. Pt also complains of nausea, sneezing, sinus pressure, and headaches. Pt took tylenol and Mucinex which provided little relief. At home COVID test-- negative.    Sore Throat   This is a new problem. The current episode started in the past 7 days. The problem has been unchanged. Neither side of throat is experiencing more pain than the other. There has been no fever. The pain is at a severity of 6/10. The pain is moderate. Associated symptoms include congestion, coughing, headaches, a plugged ear sensation and trouble swallowing. Pertinent negatives include no ear pain or neck pain. She has had no exposure to strep or mono. She has tried acetaminophen for the symptoms. The treatment provided mild relief.     HENT:  Positive for congestion, sore throat and trouble swallowing. Negative for ear pain.    Neck: Negative for neck pain.   Respiratory:  Positive for cough.    Neurological:  Positive for headaches.      Objective:     Physical Exam   Constitutional: She appears ill. No distress. normal  HENT:   Head: Normocephalic and atraumatic.   Nose: Congestion present.   Mouth/Throat: Mucous membranes are moist. Posterior oropharyngeal erythema present.   Cardiovascular: Normal rate, regular rhythm, normal heart sounds and normal pulses.   Pulmonary/Chest: Effort normal and breath sounds normal.   Abdominal: Normal appearance and bowel sounds are normal. Soft.   Neurological: She is alert.   Nursing note and vitals reviewed.    Results for orders placed or performed in visit on " 10/14/23   POCT Strep A, Molecular   Result Value Ref Range    Molecular Strep A, POC Negative Negative     Acceptable Yes    SARS Coronavirus 2 Antigen, POCT Manual Read   Result Value Ref Range    SARS Coronavirus 2 Antigen Negative Negative     Acceptable Yes    POCT Influenza A/B MOLECULAR   Result Value Ref Range    POC Molecular Influenza A Ag Positive (A) Negative, Not Reported    POC Molecular Influenza B Ag Negative Negative, Not Reported     Acceptable Yes         Assessment:     1. Influenza    Too late for Tamiflu    Plan:       Influenza  -     POCT Strep A, Molecular  -     SARS Coronavirus 2 Antigen, POCT Manual Read  -     POCT Influenza A/B MOLECULAR  -     benzonatate (TESSALON PERLES) 100 MG capsule; Take 1 capsule (100 mg total) by mouth every 6 (six) hours as needed for Cough.  Dispense: 30 capsule; Refill: 1  -     promethazine-dextromethorphan (PROMETHAZINE-DM) 6.25-15 mg/5 mL Syrp; Take 5 mLs by mouth nightly as needed (cough).  Dispense: 118 mL; Refill: 0    Discussed OTC remedies

## 2023-10-14 NOTE — LETTER
October 14, 2023      [facility]  [address]  [facility] [place] [address]  Phone: [phone]  Fax: [phone]       Patient: Linda Rich   YOB: 1990  Date of Visit: 10/14/2023    To Whom It May Concern:    Yosi Rich  was at [facility] on 10/14/2023. The patient may return to work/school on 10/17/2023 with no restrictions. If you have any questions or concerns, or if I can be of further assistance, please do not hesitate to contact me.    Sincerely,            Chi Casey MD

## 2023-12-30 ENCOUNTER — PATIENT MESSAGE (OUTPATIENT)
Dept: PSYCHIATRY | Facility: CLINIC | Age: 33
End: 2023-12-30
Payer: COMMERCIAL

## 2024-01-04 DIAGNOSIS — F32.5 MAJOR DEPRESSIVE DISORDER IN FULL REMISSION, UNSPECIFIED WHETHER RECURRENT: ICD-10-CM

## 2024-01-04 DIAGNOSIS — F41.0 PANIC DISORDER WITHOUT AGORAPHOBIA WITH MILD PANIC ATTACKS: ICD-10-CM

## 2024-01-04 DIAGNOSIS — F90.0 ADHD (ATTENTION DEFICIT HYPERACTIVITY DISORDER), INATTENTIVE TYPE: ICD-10-CM

## 2024-01-08 RX ORDER — FLUOXETINE 20 MG/1
20 TABLET ORAL DAILY
Qty: 30 TABLET | Refills: 2 | Status: SHIPPED | OUTPATIENT
Start: 2024-01-08 | End: 2024-04-07

## 2024-01-08 RX ORDER — BUPROPION HYDROCHLORIDE 150 MG/1
150 TABLET ORAL DAILY
Qty: 30 TABLET | Refills: 2 | Status: SHIPPED | OUTPATIENT
Start: 2024-01-08 | End: 2024-04-07

## 2024-01-08 RX ORDER — ALPRAZOLAM 0.5 MG/1
0.5 TABLET ORAL DAILY PRN
Qty: 10 TABLET | Refills: 0 | Status: SHIPPED | OUTPATIENT
Start: 2024-01-08

## 2024-03-11 ENCOUNTER — OFFICE VISIT (OUTPATIENT)
Dept: PSYCHIATRY | Facility: CLINIC | Age: 34
End: 2024-03-11
Payer: COMMERCIAL

## 2024-03-11 DIAGNOSIS — F32.5 MAJOR DEPRESSIVE DISORDER IN FULL REMISSION, UNSPECIFIED WHETHER RECURRENT: ICD-10-CM

## 2024-03-11 DIAGNOSIS — F90.0 ADHD (ATTENTION DEFICIT HYPERACTIVITY DISORDER), INATTENTIVE TYPE: ICD-10-CM

## 2024-03-11 DIAGNOSIS — F41.0 PANIC DISORDER WITHOUT AGORAPHOBIA WITH MILD PANIC ATTACKS: ICD-10-CM

## 2024-03-11 PROCEDURE — 99214 OFFICE O/P EST MOD 30 MIN: CPT | Mod: 95,,, | Performed by: STUDENT IN AN ORGANIZED HEALTH CARE EDUCATION/TRAINING PROGRAM

## 2024-03-11 RX ORDER — BUPROPION HYDROCHLORIDE 150 MG/1
150 TABLET ORAL DAILY
Qty: 30 TABLET | Refills: 11 | Status: SHIPPED | OUTPATIENT
Start: 2024-03-11 | End: 2024-05-20

## 2024-03-11 RX ORDER — ALPRAZOLAM 0.5 MG/1
0.5 TABLET ORAL DAILY PRN
Qty: 10 TABLET | Refills: 2 | Status: SHIPPED | OUTPATIENT
Start: 2024-03-11

## 2024-03-11 RX ORDER — FLUOXETINE 20 MG/1
20 TABLET ORAL DAILY
Qty: 30 TABLET | Refills: 11 | Status: SHIPPED | OUTPATIENT
Start: 2024-03-11 | End: 2024-05-20 | Stop reason: SDUPTHER

## 2024-03-11 RX ORDER — DEXTROAMPHETAMINE SACCHARATE, AMPHETAMINE ASPARTATE MONOHYDRATE, DEXTROAMPHETAMINE SULFATE AND AMPHETAMINE SULFATE 2.5; 2.5; 2.5; 2.5 MG/1; MG/1; MG/1; MG/1
10 CAPSULE, EXTENDED RELEASE ORAL EVERY MORNING
Qty: 30 CAPSULE | Refills: 0 | Status: SHIPPED | OUTPATIENT
Start: 2024-03-11 | End: 2024-04-03

## 2024-03-11 NOTE — PROGRESS NOTES
Outpatient Psychiatry Follow-Up Visit (MD/NP)    3/11/2024    Clinical Status of Patient:  Outpatient (Ambulatory)    Chief Complaint:  Linda Rich is a 33 y.o. female who presents today for follow-up of  MDD, in remission   LUTHER with panic attacks, r/o OCD. Met with patient.      Interval History and Content of Current Session:  Interim Events/Subjective Report/Content of Current Session:     Patient reports doing well since last appointment. Tolerating medications well; denies side effects or adverse events. Notes that Wellbutrin isn't helping with ADHD sxs and wondering if she can try another ADHD medication. Denies personal h/o arrythmia, structrual heart conditions, etc. Denies current SOB, dizziness, palpitations, CP. Denies family h/o MI or CVA prior to age 65, denies family h/o sudden cardiac death. Discussed most common side effects and most severe/fatal side effects of stimulant medication in addition to benefits, other risks, alternatives, vs no medication.      Review of Systems   PSYCHIATRIC: Pertinant items are noted in the narrative.    Past Medical, Family and Social History: The patient's past medical, family and social history have been reviewed and updated as appropriate within the electronic medical record - see encounter notes.    Compliance: yes    Side effects: GI upset, irritability, anxiety    Risk Parameters:  Patient reports no suicidal ideation  Patient reports no homicidal ideation  Patient reports no self-injurious behavior  Patient reports no violent behavior    Exam (detailed: at least 9 elements; comprehensive: all 15 elements)   Constitutional  Vitals:  Most recent vital signs, dated less than 90 days prior to this appointment, were reviewed.   There were no vitals filed for this visit.     General:  unremarkable, age appropriate     Musculoskeletal  Muscle Strength/Tone:  not examined   Gait & Station:  non-ataxic     Psychiatric  Speech:  no latency; no press   Mood &  Affect:  euthymic  congruent and appropriate   Thought Process:  normal and logical   Associations:  intact   Thought Content:  normal, no suicidality, no homicidality, delusions, or paranoia   Insight:  intact, has awareness of illness   Judgement: behavior is adequate to circumstances   Orientation:  person, place, situation, time/date   Memory: intact for content of interview, memory >3 objects at five mins, able to remember recent events- Yes, able to remember remote events- Yes   Language: grossly intact   Attention Span & Concentration:  able to focus, completed tasks   Fund of Knowledge:  intact and appropriate to age and level of education, 4 of 4 recent presidents     Assessment and Diagnosis   Status/Progress: Based on the examination today, the patient's problem(s) is/are adequately but not ideally controlled.  New problems have not been presented today.   Co-morbidities are not complicating management of the primary condition.  The working differential for this patient includes LUTHER, OCD, ASD.     General Impression:     R/o ASD  R/o OCD    ICD-10-CM ICD-9-CM   1. Panic disorder without agoraphobia with mild panic attacks  F41.0 300.01   2. Major depressive disorder in full remission, unspecified whether recurrent  F32.5 296.26   3. ADHD (attention deficit hyperactivity disorder), inattentive type  F90.0 314.00       Intervention/Counseling/Treatment Plan   Continue Prozac 20 mg daily. Consider increasing 40 mg in future.   Continue Wellbutrin XL to 150 mg daily   Start Adderall XR 10 mg daily for ADHD  Continue Xanax 0.5 mg PO daily PRN for panic attacks  PDMP reviewed: patient filling all medications on time, no abnormalities noted   Last refill picked up on 6/22/23  Continue with psychotherapy    Medication Management: Discussed with patient informed consent including diagnosis, risks and benefits of proposed treatment above vs. alternative treatments vs. no treatment, as well as serious and common  side effects of these treatments, and the inherent unpredictability of individual responses to these treatments. The patient expresses understanding of the above and displays the capacity to agree with this current plan. Patient also agrees that, currently, the benefits outweigh the risks and would like to pursue treatment at this time, and had no other questions.        Patient instructions:   Keep future appointments and take medications as prescribed.   Abstain from substance use.   In the event of an emergency, including suicidal ideation, patient was advised to go to the nearest emergency room or call 911.  Contact with questions. Patient aware that office/EMR communications are for non-emergent issues only and may take up to several days for response       Return to Clinic: 1 month or sooner CAIO Silva DO, [facility], PGY-3

## 2024-03-18 ENCOUNTER — PATIENT MESSAGE (OUTPATIENT)
Dept: PSYCHIATRY | Facility: CLINIC | Age: 34
End: 2024-03-18
Payer: COMMERCIAL

## 2024-04-03 ENCOUNTER — PATIENT MESSAGE (OUTPATIENT)
Dept: PSYCHIATRY | Facility: CLINIC | Age: 34
End: 2024-04-03
Payer: COMMERCIAL

## 2024-04-03 DIAGNOSIS — F90.0 ADHD (ATTENTION DEFICIT HYPERACTIVITY DISORDER), INATTENTIVE TYPE: Primary | ICD-10-CM

## 2024-04-03 RX ORDER — DEXTROAMPHETAMINE SACCHARATE, AMPHETAMINE ASPARTATE MONOHYDRATE, DEXTROAMPHETAMINE SULFATE AND AMPHETAMINE SULFATE 3.75; 3.75; 3.75; 3.75 MG/1; MG/1; MG/1; MG/1
15 CAPSULE, EXTENDED RELEASE ORAL EVERY MORNING
Qty: 30 CAPSULE | Refills: 0 | Status: SHIPPED | OUTPATIENT
Start: 2024-04-03 | End: 2024-05-20 | Stop reason: SDUPTHER

## 2024-04-08 ENCOUNTER — OFFICE VISIT (OUTPATIENT)
Dept: PSYCHIATRY | Facility: CLINIC | Age: 34
End: 2024-04-08
Payer: COMMERCIAL

## 2024-04-08 VITALS
WEIGHT: 142.13 LBS | HEART RATE: 86 BPM | SYSTOLIC BLOOD PRESSURE: 116 MMHG | DIASTOLIC BLOOD PRESSURE: 74 MMHG | BODY MASS INDEX: 25.17 KG/M2

## 2024-04-08 DIAGNOSIS — F90.0 ADHD (ATTENTION DEFICIT HYPERACTIVITY DISORDER), INATTENTIVE TYPE: Primary | ICD-10-CM

## 2024-04-08 DIAGNOSIS — F41.1 GAD (GENERALIZED ANXIETY DISORDER): ICD-10-CM

## 2024-04-08 DIAGNOSIS — F32.5 MAJOR DEPRESSIVE DISORDER IN FULL REMISSION, UNSPECIFIED WHETHER RECURRENT: ICD-10-CM

## 2024-04-08 PROCEDURE — 3078F DIAST BP <80 MM HG: CPT | Mod: CPTII,S$GLB,, | Performed by: STUDENT IN AN ORGANIZED HEALTH CARE EDUCATION/TRAINING PROGRAM

## 2024-04-08 PROCEDURE — 99214 OFFICE O/P EST MOD 30 MIN: CPT | Mod: S$GLB,,, | Performed by: STUDENT IN AN ORGANIZED HEALTH CARE EDUCATION/TRAINING PROGRAM

## 2024-04-08 PROCEDURE — 3074F SYST BP LT 130 MM HG: CPT | Mod: CPTII,S$GLB,, | Performed by: STUDENT IN AN ORGANIZED HEALTH CARE EDUCATION/TRAINING PROGRAM

## 2024-04-08 PROCEDURE — 99999 PR PBB SHADOW E&M-EST. PATIENT-LVL II: CPT | Mod: PBBFAC,,, | Performed by: STUDENT IN AN ORGANIZED HEALTH CARE EDUCATION/TRAINING PROGRAM

## 2024-04-08 PROCEDURE — 3008F BODY MASS INDEX DOCD: CPT | Mod: CPTII,S$GLB,, | Performed by: STUDENT IN AN ORGANIZED HEALTH CARE EDUCATION/TRAINING PROGRAM

## 2024-04-08 RX ORDER — DEXTROAMPHETAMINE SACCHARATE, AMPHETAMINE ASPARTATE MONOHYDRATE, DEXTROAMPHETAMINE SULFATE AND AMPHETAMINE SULFATE 2.5; 2.5; 2.5; 2.5 MG/1; MG/1; MG/1; MG/1
10 CAPSULE, EXTENDED RELEASE ORAL EVERY MORNING
Qty: 30 CAPSULE | Refills: 0 | Status: SHIPPED | OUTPATIENT
Start: 2024-04-08 | End: 2024-05-09

## 2024-04-08 NOTE — PROGRESS NOTES
Outpatient Psychiatry Follow-Up Visit (MD/NP)    4/8/2024    Clinical Status of Patient:  Outpatient (Ambulatory)    Chief Complaint:  Linda Rich is a 33 y.o. female who presents today for follow-up of  MDD, in remission   LUTHER with panic attacks, r/o OCD. Met with patient.      Interval History and Content of Current Session:  Interim Events/Subjective Report/Content of Current Session:     Patient reports doing well since last appointment. Tolerating medications well; denies side effects, adverse events. Pt has yet to  the 15 mg adderall (we increased it last week) 2/2 stimulant shortage. Pt states that the medication works but could work better the first half of the day; also states that its effects wear off by 1 pm. Denies sleeping issues. Discussed with pt plan to add a afternoon XR dose to regimen to extend duration of action; pt amenable.       Review of Systems   PSYCHIATRIC: Pertinant items are noted in the narrative.    Past Medical, Family and Social History: The patient's past medical, family and social history have been reviewed and updated as appropriate within the electronic medical record - see encounter notes.    Compliance: yes    Side effects: GI upset, irritability, anxiety    Risk Parameters:  Patient reports no suicidal ideation  Patient reports no homicidal ideation  Patient reports no self-injurious behavior  Patient reports no violent behavior    Exam (detailed: at least 9 elements; comprehensive: all 15 elements)   Constitutional  Vitals:  Most recent vital signs, dated less than 90 days prior to this appointment, were reviewed.   Vitals:    04/08/24 1525   BP: 116/74   Pulse: 86   Weight: 64.5 kg (142 lb 1.6 oz)        General:  unremarkable, age appropriate     Musculoskeletal  Muscle Strength/Tone:  not examined   Gait & Station:  non-ataxic     Psychiatric  Speech:  no latency; no press   Mood & Affect:  euthymic  congruent and appropriate   Thought Process:  normal and  logical   Associations:  intact   Thought Content:  normal, no suicidality, no homicidality, delusions, or paranoia   Insight:  intact, has awareness of illness   Judgement: behavior is adequate to circumstances   Orientation:  person, place, situation, time/date   Memory: intact for content of interview, memory >3 objects at five mins, able to remember recent events- Yes, able to remember remote events- Yes   Language: grossly intact   Attention Span & Concentration:  able to focus, completed tasks   Fund of Knowledge:  intact and appropriate to age and level of education, 4 of 4 recent presidents     Assessment and Diagnosis   Status/Progress: Based on the examination today, the patient's problem(s) is/are adequately but not ideally controlled.  New problems have not been presented today.   Co-morbidities are not complicating management of the primary condition.  The working differential for this patient includes LUTHER, OCD, ASD.     General Impression:     R/o ASD  R/o OCD    ICD-10-CM ICD-9-CM   1. ADHD (attention deficit hyperactivity disorder), inattentive type  F90.0 314.00   2. Major depressive disorder in full remission, unspecified whether recurrent  F32.5 296.26   3. LUTHER (generalized anxiety disorder)  F41.1 300.02         Intervention/Counseling/Treatment Plan   Continue Prozac 20 mg daily. Consider increasing 40 mg in future.   Continue Wellbutrin XL to 150 mg daily   Increase Adderall XR to 15 mg daily for ADHD and also start Adderall XR 10 mg in the afternoons to extend effect duration (discussed reasons for avoiding IR)--vitals, pdmp reviewed  Continue Xanax 0.5 mg PO daily PRN for panic attacks  PDMP reviewed: patient filling all medications on time, no abnormalities noted   Last refill picked up on 6/22/23  Continue with psychotherapy  Discussed resident transition    Medication Management: Discussed with patient informed consent including diagnosis, risks and benefits of proposed treatment above vs.  alternative treatments vs. no treatment, as well as serious and common side effects of these treatments, and the inherent unpredictability of individual responses to these treatments. The patient expresses understanding of the above and displays the capacity to agree with this current plan. Patient also agrees that, currently, the benefits outweigh the risks and would like to pursue treatment at this time, and had no other questions.        Patient instructions:   Keep future appointments and take medications as prescribed.   Abstain from substance use.   In the event of an emergency, including suicidal ideation, patient was advised to go to the nearest emergency room or call 911.  Contact with questions. Patient aware that office/EMR communications are for non-emergent issues only and may take up to several days for response       Return to Clinic: 1 month or sooner CAIO Silva DO, [facility], PGY-3

## 2024-05-04 ENCOUNTER — PATIENT MESSAGE (OUTPATIENT)
Dept: PSYCHIATRY | Facility: CLINIC | Age: 34
End: 2024-05-04
Payer: COMMERCIAL

## 2024-05-08 DIAGNOSIS — F90.0 ADHD (ATTENTION DEFICIT HYPERACTIVITY DISORDER), INATTENTIVE TYPE: ICD-10-CM

## 2024-05-08 RX ORDER — DEXTROAMPHETAMINE SACCHARATE, AMPHETAMINE ASPARTATE MONOHYDRATE, DEXTROAMPHETAMINE SULFATE AND AMPHETAMINE SULFATE 3.75; 3.75; 3.75; 3.75 MG/1; MG/1; MG/1; MG/1
15 CAPSULE, EXTENDED RELEASE ORAL EVERY MORNING
Qty: 30 CAPSULE | Refills: 0 | Status: CANCELLED | OUTPATIENT
Start: 2024-05-08

## 2024-05-09 RX ORDER — DEXTROAMPHETAMINE SACCHARATE, AMPHETAMINE ASPARTATE MONOHYDRATE, DEXTROAMPHETAMINE SULFATE AND AMPHETAMINE SULFATE 3.75; 3.75; 3.75; 3.75 MG/1; MG/1; MG/1; MG/1
15 CAPSULE, EXTENDED RELEASE ORAL DAILY
Qty: 30 CAPSULE | Refills: 0 | Status: SHIPPED | OUTPATIENT
Start: 2024-05-09 | End: 2024-05-20 | Stop reason: SDUPTHER

## 2024-05-09 RX ORDER — DEXTROAMPHETAMINE SACCHARATE, AMPHETAMINE ASPARTATE MONOHYDRATE, DEXTROAMPHETAMINE SULFATE AND AMPHETAMINE SULFATE 5; 5; 5; 5 MG/1; MG/1; MG/1; MG/1
20 CAPSULE, EXTENDED RELEASE ORAL EVERY MORNING
Qty: 30 CAPSULE | Refills: 0 | Status: SHIPPED | OUTPATIENT
Start: 2024-05-09 | End: 2024-05-20 | Stop reason: SDUPTHER

## 2024-05-20 ENCOUNTER — OFFICE VISIT (OUTPATIENT)
Dept: PSYCHIATRY | Facility: CLINIC | Age: 34
End: 2024-05-20
Payer: COMMERCIAL

## 2024-05-20 DIAGNOSIS — F90.0 ADHD (ATTENTION DEFICIT HYPERACTIVITY DISORDER), INATTENTIVE TYPE: ICD-10-CM

## 2024-05-20 DIAGNOSIS — F32.5 MAJOR DEPRESSIVE DISORDER IN FULL REMISSION, UNSPECIFIED WHETHER RECURRENT: ICD-10-CM

## 2024-05-20 PROCEDURE — 99214 OFFICE O/P EST MOD 30 MIN: CPT | Mod: 95,,, | Performed by: STUDENT IN AN ORGANIZED HEALTH CARE EDUCATION/TRAINING PROGRAM

## 2024-05-20 RX ORDER — DEXTROAMPHETAMINE SACCHARATE, AMPHETAMINE ASPARTATE MONOHYDRATE, DEXTROAMPHETAMINE SULFATE AND AMPHETAMINE SULFATE 5; 5; 5; 5 MG/1; MG/1; MG/1; MG/1
20 CAPSULE, EXTENDED RELEASE ORAL EVERY MORNING
Qty: 30 CAPSULE | Refills: 0 | Status: SHIPPED | OUTPATIENT
Start: 2024-05-20

## 2024-05-20 RX ORDER — DEXTROAMPHETAMINE SACCHARATE, AMPHETAMINE ASPARTATE MONOHYDRATE, DEXTROAMPHETAMINE SULFATE AND AMPHETAMINE SULFATE 3.75; 3.75; 3.75; 3.75 MG/1; MG/1; MG/1; MG/1
15 CAPSULE, EXTENDED RELEASE ORAL DAILY
Qty: 30 CAPSULE | Refills: 0 | Status: SHIPPED | OUTPATIENT
Start: 2024-06-19 | End: 2024-07-19

## 2024-05-20 RX ORDER — DEXTROAMPHETAMINE SACCHARATE, AMPHETAMINE ASPARTATE MONOHYDRATE, DEXTROAMPHETAMINE SULFATE AND AMPHETAMINE SULFATE 3.75; 3.75; 3.75; 3.75 MG/1; MG/1; MG/1; MG/1
15 CAPSULE, EXTENDED RELEASE ORAL DAILY
Qty: 30 CAPSULE | Refills: 0 | Status: SHIPPED | OUTPATIENT
Start: 2024-07-19 | End: 2024-08-18

## 2024-05-20 RX ORDER — DEXTROAMPHETAMINE SACCHARATE, AMPHETAMINE ASPARTATE MONOHYDRATE, DEXTROAMPHETAMINE SULFATE AND AMPHETAMINE SULFATE 5; 5; 5; 5 MG/1; MG/1; MG/1; MG/1
20 CAPSULE, EXTENDED RELEASE ORAL EVERY MORNING
Qty: 30 CAPSULE | Refills: 0 | Status: SHIPPED | OUTPATIENT
Start: 2024-07-19

## 2024-05-20 RX ORDER — DEXTROAMPHETAMINE SACCHARATE, AMPHETAMINE ASPARTATE MONOHYDRATE, DEXTROAMPHETAMINE SULFATE AND AMPHETAMINE SULFATE 3.75; 3.75; 3.75; 3.75 MG/1; MG/1; MG/1; MG/1
15 CAPSULE, EXTENDED RELEASE ORAL EVERY MORNING
Qty: 30 CAPSULE | Refills: 0 | Status: SHIPPED | OUTPATIENT
Start: 2024-05-20

## 2024-05-20 RX ORDER — FLUOXETINE 20 MG/1
20 TABLET ORAL DAILY
Qty: 30 TABLET | Refills: 11 | Status: SHIPPED | OUTPATIENT
Start: 2024-05-20 | End: 2025-05-15

## 2024-05-20 RX ORDER — DEXTROAMPHETAMINE SACCHARATE, AMPHETAMINE ASPARTATE MONOHYDRATE, DEXTROAMPHETAMINE SULFATE AND AMPHETAMINE SULFATE 5; 5; 5; 5 MG/1; MG/1; MG/1; MG/1
20 CAPSULE, EXTENDED RELEASE ORAL EVERY MORNING
Qty: 30 CAPSULE | Refills: 0 | Status: SHIPPED | OUTPATIENT
Start: 2024-06-19

## 2024-05-20 NOTE — PROGRESS NOTES
Outpatient Psychiatry Follow-Up Visit (MD/NP)    5/20/2024    Clinical Status of Patient:  Outpatient (Ambulatory)    Chief Complaint:  Linda Rich is a 33 y.o. female who presents today for follow-up of  MDD, in remission   LUTHER with panic attacks, r/o OCD. Met with patient.      Interval History and Content of Current Session:  Interim Events/Subjective Report/Content of Current Session:     Patient reports doing well since last appointment. Tolerating medications well; denies side effects, adverse events. Pt is happy with the twice daily dosing of Adderall XR; ADHD sxs adequately controlled. Denies side effects, adverse events. Discussed resident transition again.       Review of Systems   PSYCHIATRIC: Pertinant items are noted in the narrative.    Past Medical, Family and Social History: The patient's past medical, family and social history have been reviewed and updated as appropriate within the electronic medical record - see encounter notes.    Compliance: yes    Side effects: GI upset, irritability, anxiety    Risk Parameters:  Patient reports no suicidal ideation  Patient reports no homicidal ideation  Patient reports no self-injurious behavior  Patient reports no violent behavior    Exam (detailed: at least 9 elements; comprehensive: all 15 elements)   Constitutional  Vitals:  Most recent vital signs, dated less than 90 days prior to this appointment, were reviewed.   There were no vitals filed for this visit.       General:  unremarkable, age appropriate     Musculoskeletal  Muscle Strength/Tone:  not examined   Gait & Station:  non-ataxic     Psychiatric  Speech:  no latency; no press   Mood & Affect:  euthymic  congruent and appropriate   Thought Process:  normal and logical   Associations:  intact   Thought Content:  normal, no suicidality, no homicidality, delusions, or paranoia   Insight:  intact, has awareness of illness   Judgement: behavior is adequate to circumstances   Orientation:   person, place, situation, time/date   Memory: intact for content of interview, memory >3 objects at five mins, able to remember recent events- Yes, able to remember remote events- Yes   Language: grossly intact   Attention Span & Concentration:  able to focus, completed tasks   Fund of Knowledge:  intact and appropriate to age and level of education, 4 of 4 recent presidents     Assessment and Diagnosis   Status/Progress: Based on the examination today, the patient's problem(s) is/are adequately but not ideally controlled.  New problems have not been presented today.   Co-morbidities are not complicating management of the primary condition.  The working differential for this patient includes LUTHER, OCD, ASD.     General Impression:     R/o ASD  R/o OCD    ICD-10-CM ICD-9-CM   1. ADHD (attention deficit hyperactivity disorder), inattentive type  F90.0 314.00   2. Major depressive disorder in full remission, unspecified whether recurrent  F32.5 296.26         Intervention/Counseling/Treatment Plan   Continue Prozac 20 mg daily. Consider increasing 40 mg in future.   Continue Wellbutrin XL to 150 mg daily   Continue Adderall XR to 20 mg/15 mg--vitals, pdmp reviewed  Continue Xanax 0.5 mg PO daily PRN for panic attacks  PDMP reviewed: patient filling all medications on time, no abnormalities noted   Last refill picked up on 6/22/23  Continue with psychotherapy  Discussed resident transition    Medication Management: Discussed with patient informed consent including diagnosis, risks and benefits of proposed treatment above vs. alternative treatments vs. no treatment, as well as serious and common side effects of these treatments, and the inherent unpredictability of individual responses to these treatments. The patient expresses understanding of the above and displays the capacity to agree with this current plan. Patient also agrees that, currently, the benefits outweigh the risks and would like to pursue treatment at this  time, and had no other questions.        Patient instructions:   Keep future appointments and take medications as prescribed.   Abstain from substance use.   In the event of an emergency, including suicidal ideation, patient was advised to go to the nearest emergency room or call 911.  Contact with questions. Patient aware that office/EMR communications are for non-emergent issues only and may take up to several days for response       Return to Clinic: 1 month or sooner CAIO Silva DO, [facility], PGY-3

## 2024-05-26 NOTE — PROGRESS NOTES
I have reviewed the notes, assessments, and/or procedures performed by the resident, I concur with her/his documentation of Linda Rich.  Date of Service: 4/8/2024

## 2024-05-26 NOTE — PROGRESS NOTES
I have reviewed the notes, assessments, and/or procedures performed by the resident, I concur with her/his documentation of Linda Rich.  Date of Service: 3/11/2024

## 2024-05-26 NOTE — PROGRESS NOTES
I have reviewed the notes, assessments, and/or procedures performed by the resident, I concur with her/his documentation of Linda Rich.  Date of Service: 5/20/2024

## 2024-07-18 ENCOUNTER — PATIENT MESSAGE (OUTPATIENT)
Dept: PSYCHIATRY | Facility: CLINIC | Age: 34
End: 2024-07-18
Payer: COMMERCIAL

## 2024-08-07 ENCOUNTER — OFFICE VISIT (OUTPATIENT)
Dept: PSYCHIATRY | Facility: CLINIC | Age: 34
End: 2024-08-07
Payer: COMMERCIAL

## 2024-08-07 VITALS
DIASTOLIC BLOOD PRESSURE: 79 MMHG | WEIGHT: 140.75 LBS | HEART RATE: 100 BPM | SYSTOLIC BLOOD PRESSURE: 125 MMHG | BODY MASS INDEX: 24.94 KG/M2

## 2024-08-07 DIAGNOSIS — F90.0 ADHD (ATTENTION DEFICIT HYPERACTIVITY DISORDER), INATTENTIVE TYPE: ICD-10-CM

## 2024-08-07 PROCEDURE — 1160F RVW MEDS BY RX/DR IN RCRD: CPT | Mod: CPTII,S$GLB,, | Performed by: FAMILY MEDICINE

## 2024-08-07 PROCEDURE — 1159F MED LIST DOCD IN RCRD: CPT | Mod: CPTII,S$GLB,, | Performed by: FAMILY MEDICINE

## 2024-08-07 PROCEDURE — 99999 PR PBB SHADOW E&M-EST. PATIENT-LVL III: CPT | Mod: PBBFAC,,, | Performed by: FAMILY MEDICINE

## 2024-08-07 PROCEDURE — 99214 OFFICE O/P EST MOD 30 MIN: CPT | Mod: S$GLB,,, | Performed by: FAMILY MEDICINE

## 2024-08-07 PROCEDURE — 3074F SYST BP LT 130 MM HG: CPT | Mod: CPTII,S$GLB,, | Performed by: FAMILY MEDICINE

## 2024-08-07 PROCEDURE — 3008F BODY MASS INDEX DOCD: CPT | Mod: CPTII,S$GLB,, | Performed by: FAMILY MEDICINE

## 2024-08-07 PROCEDURE — 3078F DIAST BP <80 MM HG: CPT | Mod: CPTII,S$GLB,, | Performed by: FAMILY MEDICINE

## 2024-08-07 RX ORDER — DEXTROAMPHETAMINE SACCHARATE, AMPHETAMINE ASPARTATE MONOHYDRATE, DEXTROAMPHETAMINE SULFATE AND AMPHETAMINE SULFATE 3.75; 3.75; 3.75; 3.75 MG/1; MG/1; MG/1; MG/1
15 CAPSULE, EXTENDED RELEASE ORAL DAILY
Qty: 30 CAPSULE | Refills: 0 | Status: SHIPPED | OUTPATIENT
Start: 2024-08-16 | End: 2024-09-15

## 2024-08-18 ENCOUNTER — PATIENT MESSAGE (OUTPATIENT)
Dept: PSYCHIATRY | Facility: CLINIC | Age: 34
End: 2024-08-18
Payer: COMMERCIAL

## 2024-08-21 DIAGNOSIS — F90.0 ADHD (ATTENTION DEFICIT HYPERACTIVITY DISORDER), INATTENTIVE TYPE: ICD-10-CM

## 2024-08-22 RX ORDER — DEXTROAMPHETAMINE SACCHARATE, AMPHETAMINE ASPARTATE MONOHYDRATE, DEXTROAMPHETAMINE SULFATE AND AMPHETAMINE SULFATE 5; 5; 5; 5 MG/1; MG/1; MG/1; MG/1
20 CAPSULE, EXTENDED RELEASE ORAL EVERY MORNING
Qty: 30 CAPSULE | Refills: 0 | Status: SHIPPED | OUTPATIENT
Start: 2024-08-22

## 2024-09-07 ENCOUNTER — PATIENT MESSAGE (OUTPATIENT)
Dept: PSYCHIATRY | Facility: CLINIC | Age: 34
End: 2024-09-07
Payer: COMMERCIAL

## 2024-09-16 ENCOUNTER — TELEPHONE (OUTPATIENT)
Dept: PSYCHIATRY | Facility: HOSPITAL | Age: 34
End: 2024-09-16
Payer: COMMERCIAL

## 2024-09-16 RX ORDER — DEXTROAMPHETAMINE SACCHARATE, AMPHETAMINE ASPARTATE MONOHYDRATE, DEXTROAMPHETAMINE SULFATE AND AMPHETAMINE SULFATE 5; 5; 5; 5 MG/1; MG/1; MG/1; MG/1
40 CAPSULE, EXTENDED RELEASE ORAL EVERY MORNING
Qty: 60 CAPSULE | Refills: 0 | Status: SHIPPED | OUTPATIENT
Start: 2024-09-16

## 2024-09-16 NOTE — TELEPHONE ENCOUNTER
Spoke with patient on the phone states she has been taking 35 mg daily of Adderall and it has been helping. Sending in new scrip for Adderall 40 mg daily.

## 2024-10-27 DIAGNOSIS — F90.0 ADHD (ATTENTION DEFICIT HYPERACTIVITY DISORDER), INATTENTIVE TYPE: Primary | ICD-10-CM

## 2024-10-29 RX ORDER — DEXTROAMPHETAMINE SACCHARATE, AMPHETAMINE ASPARTATE MONOHYDRATE, DEXTROAMPHETAMINE SULFATE AND AMPHETAMINE SULFATE 5; 5; 5; 5 MG/1; MG/1; MG/1; MG/1
40 CAPSULE, EXTENDED RELEASE ORAL EVERY MORNING
Qty: 60 CAPSULE | Refills: 0 | Status: SHIPPED | OUTPATIENT
Start: 2024-10-29

## 2024-11-04 ENCOUNTER — OFFICE VISIT (OUTPATIENT)
Dept: PSYCHIATRY | Facility: CLINIC | Age: 34
End: 2024-11-04
Payer: COMMERCIAL

## 2024-11-04 DIAGNOSIS — F90.0 ADHD (ATTENTION DEFICIT HYPERACTIVITY DISORDER), INATTENTIVE TYPE: ICD-10-CM

## 2024-11-04 PROCEDURE — 99214 OFFICE O/P EST MOD 30 MIN: CPT | Mod: 95,,, | Performed by: FAMILY MEDICINE

## 2024-11-04 PROCEDURE — 1160F RVW MEDS BY RX/DR IN RCRD: CPT | Mod: CPTII,95,, | Performed by: FAMILY MEDICINE

## 2024-11-04 PROCEDURE — 1159F MED LIST DOCD IN RCRD: CPT | Mod: CPTII,95,, | Performed by: FAMILY MEDICINE

## 2024-11-04 RX ORDER — DEXTROAMPHETAMINE SACCHARATE, AMPHETAMINE ASPARTATE MONOHYDRATE, DEXTROAMPHETAMINE SULFATE AND AMPHETAMINE SULFATE 5; 5; 5; 5 MG/1; MG/1; MG/1; MG/1
40 CAPSULE, EXTENDED RELEASE ORAL EVERY MORNING
Qty: 60 CAPSULE | Refills: 0 | Status: SHIPPED | OUTPATIENT
Start: 2024-11-28 | End: 2024-12-28

## 2024-11-04 RX ORDER — DEXTROAMPHETAMINE SACCHARATE, AMPHETAMINE ASPARTATE MONOHYDRATE, DEXTROAMPHETAMINE SULFATE AND AMPHETAMINE SULFATE 5; 5; 5; 5 MG/1; MG/1; MG/1; MG/1
40 CAPSULE, EXTENDED RELEASE ORAL EVERY MORNING
Qty: 60 CAPSULE | Refills: 0 | Status: SHIPPED | OUTPATIENT
Start: 2024-12-28 | End: 2025-01-27

## 2024-11-04 NOTE — PROGRESS NOTES
"Outpatient Psychiatry Follow-Up Visit (MD/ASHLEY)    11/4/2024    Clinical Status of Patient:  Outpatient (Ambulatory)    Chief Complaint:  Linda Rich is a 34 y.o. female who presents today for follow-up of depression and anxiety.  Met with patient.      Interval History and Content of Current Session 11/04/2024:    Pt reports today: "I am doing well"    Mood overall is "States she is feeling pretty good"    Rates depression as 3/10 and anxiety as 3/10 over the past 2 weeks.    Patients mood is calm, affect appears relaxed. Linear and logical, friendly and cooperative, good eye contact.    Denies SI/HI/AVH. Pt reports sleeping well and normal appetite. Denies side effects of medications.    Pt reports taking medications as prescribed and behaving appropriately during interview today.  States she is doing fine. 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 States she recently stopped taking birth control after taking it since she was 17 years old. States she is adjusting to getting a cycle every month.   States she is doing well on the medication.     Denies wanting to hurt herself. States she is  has a dog that she loves, family and friends. States her job gives her fulfillment and states loves it.     Psychotherapy:  Target symptoms: depression, anxiety   Why chosen therapy is appropriate versus another modality: relevant to diagnosis  Outcome monitoring methods: self-report, observation  Therapeutic intervention type: supportive psychotherapy  Topics discussed/themes: stress related to medical comorbidities, difficulty managing affect in interpersonal relationships  The patient's response to the intervention is accepting. The patient's progress toward treatment goals is good.   Duration of intervention: 15 minutes.      Prior visit Pt reports taking medications as prescribed and behaving appropriately during interview today.  States she is mainly being treated with ADHD. States she was not sure " if she was having panic attacks anymore. States she is doing well on the Adderall 20 mg in the morning and 15 mg around lunch. Prozac 20 mg states she is taking it at night and it is doing well. Less hungry during the day. Patient states she stopped taking Wellbutrin. States she is not taking Xanax 0.5 mg anymore. 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 States she is navigating through things and states she is able to manage things because she has been a teacher for years. Denies SI, HI, AH, VH. :            Review of Systems     ROS    Psychiatric Review Of Systems - Is patient experiencing or having changes in:  sleep: yes  appetite: no  weight: no  energy/anergy: yes  interest/pleasure/anhedonia: yes  somatic symptoms: no  libido: no  anxiety/panic: no  guilty/hopelessness: no  concentration: no  S.I.B.s/risky behavior: no  Irritability: no  Racing thoughts: no  Impulsive behaviors: no  Paranoia: no  AVH: no      Past Medical, Family and Social History: The patient's past medical, family and social history have been reviewed and updated as appropriate within the electronic medical record - see encounter notes.      Current Medications:   Medication List with Changes/Refills   Current Medications    ALPRAZOLAM (XANAX) 0.5 MG TABLET    Take 1 tablet (0.5 mg total) by mouth daily as needed for Anxiety.    DEXTROAMPHETAMINE-AMPHETAMINE (ADDERALL XR) 20 MG 24 HR CAPSULE    Take 2 capsules (40 mg total) by mouth every morning.    FLUOXETINE 20 MG TABLET    Take 1 tablet (20 mg total) by mouth once daily.    IBUPROFEN (ADVIL,MOTRIN) 600 MG TABLET    Take 1 tablet (600 mg total) by mouth daily as needed for Pain (headache).    NORGESTIMATE-ETHINYL ESTRADIOL (ORTHO TRI-CYCLEN LO) 0.18/0.215/0.25 MG-25 MCG TABLET    Take 1 tablet by mouth once daily.         Allergies:   Review of patient's allergies indicates:   Allergen Reactions    Cefdinir Diarrhea         Vitals   There  "were no vitals filed for this visit.       Labs/Imaging/Studies:   No results found for this or any previous visit (from the past 48 hours).   No results found for: "PHENYTOIN", "PHENOBARB", "VALPROATE", "CBMZ"    Compliance: yes    Side effects: None    Risk Parameters:  Patient reports no suicidal ideation  Patient reports no homicidal ideation  Patient reports no self-injurious behavior  Patient reports no violent behavior    Exam (detailed: at least 9 elements; comprehensive: all 15 elements)   Constitutional  Vitals:  Most recent vital signs, dated less than 90 days prior to this appointment, were reviewed.   There were no vitals filed for this visit.     General:  unremarkable, age appropriate, casually dressed, well dressed     Musculoskeletal  Muscle Strength/Tone:  not examined   Gait & Station:  non-ataxic     Psychiatric  Speech:  no latency; no press   Mood & Affect:  steady  congruent and appropriate   Thought Process:  normal and logical   Associations:  intact   Thought Content:  normal, no suicidality, no homicidality, delusions, or paranoia   Insight:  has awareness of illness   Judgement: behavior is adequate to circumstances, age appropriate   Orientation:  grossly intact, person, place, situation, time/date, day of week, month of year, year   Memory: intact for content of interview   Language: grossly intact   Attention Span & Concentration:  able to focus   Fund of Knowledge:  intact and appropriate to age and level of education     Assessment and Diagnosis   Status/Progress: Based on the examination today, the patient's problem(s) is/are well controlled.  New problems have not been presented today.   Co-morbidities are not complicating management of the primary condition.  There are no active rule-out diagnoses for this patient at this time.     General Impression:    No diagnosis found.    Intervention/Counseling/Treatment Plan   Medication Management: Continue current medications. The risks " and benefits of medication were discussed with the patient.    Continue Prozac 20 mg daily  -Continue Adderall 40 mg daily.       The risks and benefits of medication were discussed with the patient.  Discussed diagnosis, risks and benefits of proposed treatment above vs alternative treatments vs no treatment, and potential side effects of these treatments. The patient expresses understanding of the above and displays the capacity to agree with this treatment given said understanding. Patient also agrees that, currently, the benefits outweigh the risks and would like to pursue treatment at this time.  Discussed inherent unpredictability of medications in each individual.   Encouraged Patient to keep future appointments.   Take medications as prescribed and abstain from substance abuse.   In the event of an emergency patient was advised to go to the emergency room      Return to Clinic: 3 months        LAWRENCE Romano      Total face to face time: 21 min        *This note has been prepared using a combination of a dictation device and typing.  It has been checked for errors but some errors may still exist within the note as a result of speech recognition errors and/or typographical errors.

## 2025-01-28 ENCOUNTER — OFFICE VISIT (OUTPATIENT)
Dept: PSYCHIATRY | Facility: CLINIC | Age: 35
End: 2025-01-28
Payer: COMMERCIAL

## 2025-01-28 DIAGNOSIS — F90.0 ADHD (ATTENTION DEFICIT HYPERACTIVITY DISORDER), INATTENTIVE TYPE: Primary | ICD-10-CM

## 2025-01-28 PROCEDURE — 1159F MED LIST DOCD IN RCRD: CPT | Mod: CPTII,95,, | Performed by: FAMILY MEDICINE

## 2025-01-28 PROCEDURE — 98005 SYNCH AUDIO-VIDEO EST LOW 20: CPT | Mod: 95,,, | Performed by: FAMILY MEDICINE

## 2025-01-28 PROCEDURE — 1160F RVW MEDS BY RX/DR IN RCRD: CPT | Mod: CPTII,95,, | Performed by: FAMILY MEDICINE

## 2025-01-28 RX ORDER — DEXTROAMPHETAMINE SACCHARATE, AMPHETAMINE ASPARTATE, DEXTROAMPHETAMINE SULFATE AND AMPHETAMINE SULFATE 5; 5; 5; 5 MG/1; MG/1; MG/1; MG/1
40 TABLET ORAL DAILY
Qty: 60 TABLET | Refills: 0 | Status: SHIPPED | OUTPATIENT
Start: 2025-01-28 | End: 2025-02-04 | Stop reason: SDUPTHER

## 2025-01-31 ENCOUNTER — PATIENT MESSAGE (OUTPATIENT)
Dept: PSYCHIATRY | Facility: CLINIC | Age: 35
End: 2025-01-31
Payer: COMMERCIAL

## 2025-02-04 ENCOUNTER — TELEPHONE (OUTPATIENT)
Dept: PSYCHIATRY | Facility: HOSPITAL | Age: 35
End: 2025-02-04
Payer: COMMERCIAL

## 2025-02-04 DIAGNOSIS — F90.0 ADHD (ATTENTION DEFICIT HYPERACTIVITY DISORDER), INATTENTIVE TYPE: ICD-10-CM

## 2025-02-04 RX ORDER — DEXTROAMPHETAMINE SACCHARATE, AMPHETAMINE ASPARTATE, DEXTROAMPHETAMINE SULFATE AND AMPHETAMINE SULFATE 5; 5; 5; 5 MG/1; MG/1; MG/1; MG/1
40 TABLET ORAL DAILY
Qty: 60 TABLET | Refills: 0 | Status: SHIPPED | OUTPATIENT
Start: 2025-02-04 | End: 2025-03-06

## 2025-02-12 NOTE — PROGRESS NOTES
"Outpatient Psychiatry Follow-Up Visit (MD/ASHLEY)    1/28/2025    Clinical Status of Patient:  Outpatient (Ambulatory)    Chief Complaint:  Linda Rich is a 34 y.o. female who presents today for follow-up of depression and anxiety.  Met with patient.      Interval History and Content of Current Session 02/12/2025:    Pt reports today: "States she is pretty tired due to being out from school due to snow."    Mood overall is "good"    Rates depression as 3/10 and anxiety as 5/10 over the past 2 weeks.    Patients mood is calm, affect appears relaxed. Linear and logical, friendly and cooperative, good eye contact.    Denies SI/HI/AVH. Pt reports sleeping well and normal appetite. Denies side effects of medications.    Pt reports taking medications as prescribed and behaving appropriately during interview today. States she has been having issues with sleep where she is able to fall asleep but wakes up through out the night. States she is able to fall back to sleep easily at times.  Denies any depression but feels her anxiety has decreased. Patient feels that the XR Adderall may be keeping her up at night.     Psychotherapy:  Target symptoms: depression, anxiety   Why chosen therapy is appropriate versus another modality: relevant to diagnosis  Outcome monitoring methods: self-report, observation  Therapeutic intervention type: insight oriented psychotherapy  Topics discussed/themes: building skills sets for symptom management, symptom recognition  The patient's response to the intervention is accepting. The patient's progress toward treatment goals is good.   Duration of intervention: 17 minutes.      Prior visit 11/04/2024:     Pt reports today: "I am doing well"     Mood overall is "States she is feeling pretty good"     Rates depression as 3/10 and anxiety as 3/10 over the past 2 weeks.     Patients mood is calm, affect appears relaxed. Linear and logical, friendly and cooperative, good eye contact.     Denies " SI/HI/AVH. Pt reports sleeping well and normal appetite. Denies side effects of medications.     Pt reports taking medications as prescribed and behaving appropriately during interview today.  States she is doing fine. 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 States she recently stopped taking birth control after taking it since she was 17 years old. States she is adjusting to getting a cycle every month.   States she is doing well on the medication.      Denies wanting to hurt herself. States she is  has a dog that she loves, family and friends. States her job gives her fulfillment and states loves it. :            Review of Systems     ROS    Psychiatric Review Of Systems - Is patient experiencing or having changes in:  sleep: states she has been having issues with sleep.   appetite: yes  weight: no  energy/anergy: yes  interest/pleasure/anhedonia: yes  somatic symptoms: no  libido: no  anxiety/panic: no  guilty/hopelessness: no  concentration: no  S.I.B.s/risky behavior: no  Irritability: no  Racing thoughts: yes  Impulsive behaviors: no  Paranoia: no  AVH: no      Past Medical, Family and Social History: The patient's past medical, family and social history have been reviewed and updated as appropriate within the electronic medical record - see encounter notes.      Current Medications:   Medication List with Changes/Refills   New Medications    DEXTROAMPHETAMINE-AMPHETAMINE (ADDERALL) 20 MG TABLET    Take 2 tablets (40 mg total) by mouth once daily.   Current Medications    ALPRAZOLAM (XANAX) 0.5 MG TABLET    Take 1 tablet (0.5 mg total) by mouth daily as needed for Anxiety.    FLUOXETINE 20 MG TABLET    Take 1 tablet (20 mg total) by mouth once daily.    IBUPROFEN (ADVIL,MOTRIN) 600 MG TABLET    Take 1 tablet (600 mg total) by mouth daily as needed for Pain (headache).    NORGESTIMATE-ETHINYL ESTRADIOL (ORTHO TRI-CYCLEN LO) 0.18/0.215/0.25 MG-25 MCG TABLET    Take 1 tablet by mouth once  "daily.   Discontinued Medications    DEXTROAMPHETAMINE-AMPHETAMINE (ADDERALL XR) 20 MG 24 HR CAPSULE    Take 2 capsules (40 mg total) by mouth every morning.         Allergies:   Review of patient's allergies indicates:   Allergen Reactions    Cefdinir Diarrhea         Vitals   There were no vitals filed for this visit.       Labs/Imaging/Studies:   No results found for this or any previous visit (from the past 48 hours).   No results found for: "PHENYTOIN", "PHENOBARB", "VALPROATE", "CBMZ"    Compliance: yes    Side effects: None    Risk Parameters:  Patient reports no suicidal ideation  Patient reports no homicidal ideation  Patient reports no self-injurious behavior  Patient reports no violent behavior    Exam (detailed: at least 9 elements; comprehensive: all 15 elements)   Constitutional  Vitals:  Most recent vital signs, dated less than 90 days prior to this appointment, were reviewed.   There were no vitals filed for this visit.     General:  unremarkable, age appropriate, well dressed, neatly groomed     Musculoskeletal  Muscle Strength/Tone:  not examined   Gait & Station:  non-ataxic     Psychiatric  Speech:  no latency; no press   Mood & Affect:  steady  congruent and appropriate   Thought Process:  normal and logical   Associations:  intact   Thought Content:  normal, no suicidality, no homicidality, delusions, or paranoia   Insight:  limited awareness of illness   Judgement: behavior is adequate to circumstances   Orientation:  grossly intact, person, place, situation, time/date, day of week, month of year, year   Memory: intact for content of interview   Language: grossly intact   Attention Span & Concentration:  able to focus   Fund of Knowledge:  intact and appropriate to age and level of education     Assessment and Diagnosis   Status/Progress: Based on the examination today, the patient's problem(s) is/are resolving.  New problems have not been presented today.   Co-morbidities are not complicating " management of the primary condition.  There are no active rule-out diagnoses for this patient at this time.     General Impression:    States she feels the Adderall 40 mg XR is keeping her up at night   .       Intervention/Counseling/Treatment Plan   Medication Management: Continue current medications. The risks and benefits of medication were discussed with the patient.    -Continue Prozac 20 mg daily  Adderall 40 mg daily. Switched patient from extended release due to difficulty sleeping at night.  The risks and benefits of medication were discussed with the patient.  Discussed diagnosis, risks and benefits of proposed treatment above vs alternative treatments vs no treatment, and potential side effects of these treatments. The patient expresses understanding of the above and displays the capacity to agree with this treatment given said understanding. Patient also agrees that, currently, the benefits outweigh the risks and would like to pursue treatment at this time.  Discussed inherent unpredictability of medications in each individual.   Encouraged Patient to keep future appointments.   Take medications as prescribed and abstain from substance abuse.   In the event of an emergency patient was advised to go to the emergency room      Return to Clinic: 3 months in person     LAWRENCE Romano      Total face to face time: 20 min        *This note has been prepared using a combination of a dictation device and typing.  It has been checked for errors but some errors may still exist within the note as a result of speech recognition errors and/or typographical errors.

## 2025-02-25 ENCOUNTER — PATIENT MESSAGE (OUTPATIENT)
Dept: PSYCHIATRY | Facility: CLINIC | Age: 35
End: 2025-02-25
Payer: COMMERCIAL

## 2025-03-07 DIAGNOSIS — F90.0 ADHD (ATTENTION DEFICIT HYPERACTIVITY DISORDER), INATTENTIVE TYPE: ICD-10-CM

## 2025-03-10 RX ORDER — DEXTROAMPHETAMINE SACCHARATE, AMPHETAMINE ASPARTATE, DEXTROAMPHETAMINE SULFATE AND AMPHETAMINE SULFATE 5; 5; 5; 5 MG/1; MG/1; MG/1; MG/1
40 TABLET ORAL DAILY
Qty: 60 TABLET | Refills: 0 | Status: SHIPPED | OUTPATIENT
Start: 2025-03-10 | End: 2025-04-09

## 2025-03-18 ENCOUNTER — OFFICE VISIT (OUTPATIENT)
Dept: PSYCHIATRY | Facility: CLINIC | Age: 35
End: 2025-03-18
Payer: COMMERCIAL

## 2025-03-18 VITALS
HEART RATE: 105 BPM | BODY MASS INDEX: 26.52 KG/M2 | SYSTOLIC BLOOD PRESSURE: 114 MMHG | DIASTOLIC BLOOD PRESSURE: 78 MMHG | WEIGHT: 149.69 LBS

## 2025-03-18 DIAGNOSIS — F90.0 ADHD (ATTENTION DEFICIT HYPERACTIVITY DISORDER), INATTENTIVE TYPE: Primary | ICD-10-CM

## 2025-03-18 PROCEDURE — 3008F BODY MASS INDEX DOCD: CPT | Mod: CPTII,S$GLB,, | Performed by: FAMILY MEDICINE

## 2025-03-18 PROCEDURE — 1159F MED LIST DOCD IN RCRD: CPT | Mod: CPTII,S$GLB,, | Performed by: FAMILY MEDICINE

## 2025-03-18 PROCEDURE — 1160F RVW MEDS BY RX/DR IN RCRD: CPT | Mod: CPTII,S$GLB,, | Performed by: FAMILY MEDICINE

## 2025-03-18 PROCEDURE — 99999 PR PBB SHADOW E&M-EST. PATIENT-LVL II: CPT | Mod: PBBFAC,,, | Performed by: FAMILY MEDICINE

## 2025-03-18 PROCEDURE — 3074F SYST BP LT 130 MM HG: CPT | Mod: CPTII,S$GLB,, | Performed by: FAMILY MEDICINE

## 2025-03-18 PROCEDURE — 99213 OFFICE O/P EST LOW 20 MIN: CPT | Mod: S$GLB,,, | Performed by: FAMILY MEDICINE

## 2025-03-18 PROCEDURE — 3078F DIAST BP <80 MM HG: CPT | Mod: CPTII,S$GLB,, | Performed by: FAMILY MEDICINE

## 2025-03-18 RX ORDER — DEXTROAMPHETAMINE SACCHARATE, AMPHETAMINE ASPARTATE, DEXTROAMPHETAMINE SULFATE AND AMPHETAMINE SULFATE 5; 5; 5; 5 MG/1; MG/1; MG/1; MG/1
40 TABLET ORAL DAILY
Qty: 60 TABLET | Refills: 0 | Status: SHIPPED | OUTPATIENT
Start: 2025-04-08 | End: 2025-05-08

## 2025-03-18 NOTE — PROGRESS NOTES
"Outpatient Psychiatry Follow-Up Visit (MD/ASHLEY)    3/18/2025    Clinical Status of Patient:  Outpatient (Ambulatory)    Chief Complaint:  Linda Rich is a 34 y.o. female who presents today for follow-up of depression and anxiety.  Met with patient.      Interval History and Content of Current Session 03/18/2025:    Pt reports today: "I am well not issues"    Mood overall is "good"    Rates depression as 2/10 and anxiety as 4/10 over the past 2 weeks.    Patients mood is calm, affect appears relaxed. Linear and logical, friendly and cooperative, good eye contact.    Denies SI/HI/AVH. Pt reports sleeping well and normal appetite. Denies side effects of medications.    Pt reports taking medications as prescribed and behaving appropriately during interview today. Patient states she is doing better since switching from extended release Adderall to the regular tablets, stating she is able to sleep with no issues. Patient denies anxiety or depression at this time.   Patient states her family,  and dog keep her going and is the reason she lives.     Psychotherapy:  Target symptoms: depression, anxiety   Why chosen therapy is appropriate versus another modality: relevant to diagnosis  Outcome monitoring methods: self-report, observation  Therapeutic intervention type: supportive psychotherapy  Topics discussed/themes: building skills sets for symptom management, symptom recognition  The patient's response to the intervention is accepting. The patient's progress toward treatment goals is good.   Duration of intervention: 17 minutes.      Prior visit 02/12/2025:     Pt reports today: "States she is pretty tired due to being out from school due to snow."     Mood overall is "good"     Rates depression as 3/10 and anxiety as 5/10 over the past 2 weeks.     Patients mood is calm, affect appears relaxed. Linear and logical, friendly and cooperative, good eye contact.     Denies SI/HI/AVH. Pt reports sleeping well and " normal appetite. Denies side effects of medications.     Pt reports taking medications as prescribed and behaving appropriately during interview today. States she has been having issues with sleep where she is able to fall asleep but wakes up through out the night. States she is able to fall back to sleep easily at times.  Denies any depression but feels her anxiety has decreased. Patient feels that the XR Adderall may be keeping her up at night.    :            Review of Systems     ROS    Psychiatric Review Of Systems - Is patient experiencing or having changes in:  sleep: yes  appetite: no  weight: no  energy/anergy: yes  interest/pleasure/anhedonia: yes  somatic symptoms: no  libido: no  anxiety/panic: no  guilty/hopelessness: no  concentration: no  S.I.B.s/risky behavior: no  Irritability: no  Racing thoughts: no  Impulsive behaviors: no  Paranoia: no  AVH: no      Past Medical, Family and Social History: The patient's past medical, family and social history have been reviewed and updated as appropriate within the electronic medical record - see encounter notes.      Current Medications:   Medication List with Changes/Refills   Current Medications    ALPRAZOLAM (XANAX) 0.5 MG TABLET    Take 1 tablet (0.5 mg total) by mouth daily as needed for Anxiety.    DEXTROAMPHETAMINE-AMPHETAMINE (ADDERALL) 20 MG TABLET    Take 2 tablets (40 mg total) by mouth once daily.    FLUOXETINE 20 MG TABLET    Take 1 tablet (20 mg total) by mouth once daily.    IBUPROFEN (ADVIL,MOTRIN) 600 MG TABLET    Take 1 tablet (600 mg total) by mouth daily as needed for Pain (headache).    NORGESTIMATE-ETHINYL ESTRADIOL (ORTHO TRI-CYCLEN LO) 0.18/0.215/0.25 MG-25 MCG TABLET    Take 1 tablet by mouth once daily.         Allergies:   Review of patient's allergies indicates:   Allergen Reactions    Cefdinir Diarrhea         Vitals   Vitals:    03/18/25 1523   BP: 114/78   Pulse: 105          Labs/Imaging/Studies:   No results found for this or  "any previous visit (from the past 48 hours).   No results found for: "PHENYTOIN", "PHENOBARB", "VALPROATE", "CBMZ"    Compliance: yes    Side effects: None    Risk Parameters:  Patient reports no suicidal ideation  Patient reports no homicidal ideation  Patient reports no self-injurious behavior  Patient reports no violent behavior    Exam (detailed: at least 9 elements; comprehensive: all 15 elements)   Constitutional  Vitals:  Most recent vital signs, dated less than 90 days prior to this appointment, were reviewed.   Vitals:    03/18/25 1523   BP: 114/78   Pulse: 105   Weight: 67.9 kg (149 lb 11.1 oz)        General:  unremarkable, age appropriate, casually dressed, well dressed, neatly groomed     Musculoskeletal  Muscle Strength/Tone:  not examined   Gait & Station:  non-ataxic     Psychiatric  Speech:  no latency; no press   Mood & Affect:  happy  congruent and appropriate   Thought Process:  normal and logical   Associations:  intact   Thought Content:  normal, no suicidality, no homicidality, delusions, or paranoia   Insight:  limited awareness of illness   Judgement: behavior is adequate to circumstances, age appropriate   Orientation:  grossly intact, person, place, situation, time/date, day of week, month of year, year   Memory: intact for content of interview, grossly intact   Language: grossly intact   Attention Span & Concentration:  able to focus   Fund of Knowledge:  intact and appropriate to age and level of education     Assessment and Diagnosis   Status/Progress: Based on the examination today, the patient's problem(s) is/are well controlled.  New problems have not been presented today.   Co-morbidities are not complicating management of the primary condition.  There are no active rule-out diagnoses for this patient at this time.     General Impression:    Generalized anxiety disorder, ADHD    Intervention/Counseling/Treatment Plan   Medication Management: Continue current medications. The risks " and benefits of medication were discussed with the patient.    -Continue Prozac 20 mg daily   Adderall take 20 mg tab twice a day       Discussed diagnosis, risks and benefits of proposed treatment above vs alternative treatments vs no treatment, and potential side effects of these treatments. The patient expresses understanding of the above and displays the capacity to agree with this treatment given said understanding. Patient also agrees that, currently, the benefits outweigh the risks and would like to pursue treatment at this time.  Discussed inherent unpredictability of medications in each individual.   Encouraged Patient to keep future appointments.   Take medications as prescribed and abstain from substance abuse.   In the event of an emergency patient was advised to go to the emergency room      Return to Clinic: 3 months    ALWRENCE Romano       Total face to face time: 28 min        *This note has been prepared using a combination of a dictation device and typing.  It has been checked for errors but some errors may still exist within the note as a result of speech recognition errors and/or typographical errors.

## 2025-04-07 ENCOUNTER — PATIENT MESSAGE (OUTPATIENT)
Dept: PSYCHIATRY | Facility: CLINIC | Age: 35
End: 2025-04-07
Payer: COMMERCIAL

## 2025-04-07 DIAGNOSIS — F32.5 MAJOR DEPRESSIVE DISORDER IN FULL REMISSION, UNSPECIFIED WHETHER RECURRENT: ICD-10-CM

## 2025-04-07 RX ORDER — FLUOXETINE 20 MG/1
20 TABLET ORAL DAILY
Qty: 30 TABLET | Refills: 11 | Status: SHIPPED | OUTPATIENT
Start: 2025-04-07 | End: 2026-04-02

## 2025-05-08 DIAGNOSIS — F90.0 ADHD (ATTENTION DEFICIT HYPERACTIVITY DISORDER), INATTENTIVE TYPE: ICD-10-CM

## 2025-05-12 RX ORDER — DEXTROAMPHETAMINE SACCHARATE, AMPHETAMINE ASPARTATE, DEXTROAMPHETAMINE SULFATE AND AMPHETAMINE SULFATE 5; 5; 5; 5 MG/1; MG/1; MG/1; MG/1
40 TABLET ORAL DAILY
Qty: 60 TABLET | Refills: 0 | Status: SHIPPED | OUTPATIENT
Start: 2025-05-12 | End: 2025-06-11

## 2025-06-11 ENCOUNTER — OFFICE VISIT (OUTPATIENT)
Dept: PSYCHIATRY | Facility: CLINIC | Age: 35
End: 2025-06-11
Payer: COMMERCIAL

## 2025-06-11 DIAGNOSIS — F41.9 ANXIETY: ICD-10-CM

## 2025-06-11 DIAGNOSIS — F41.0 PANIC DISORDER WITHOUT AGORAPHOBIA WITH MILD PANIC ATTACKS: Primary | ICD-10-CM

## 2025-06-11 NOTE — PROGRESS NOTES
"Outpatient Psychiatry Follow-Up Visit (MD/ASHLEY)    6/11/2025    Clinical Status of Patient:  Outpatient (Ambulatory)    Chief Complaint:  Linda Rich is a 34 y.o. female who presents today for follow-up of depression and anxiety.  Met with patient.      Interval History and Content of Current Session 06/11/2025:    Pt reports today: "I am well, I found out I am pregnant"    Mood overall is "good"    Rates depression as 3/10 and anxiety as 5/10 over the past 2 weeks.    Patients mood is happy, affect appears relaxed. Linear and logical, friendly and cooperative, good eye contact.    Denies SI/HI/AVH. Pt reports sleeping well and normal appetite. Denies side effects of medications.    Pt reports taking medications as prescribed and behaving appropriately during interview today. Patient states she is pregnant and wants to talk about medications. Patient states she found out she was pregnant at the end of May and is currently 9 weeks pregnant. States that was the last time she took Adderall. Patient states she spoke with her OB GYN who told her to stop Adderall but it Is ok to continue Prozac 20 mg.   Denies any thoughts of wanting to hurt herself. States that is happy and excited about the pregnancy.     Psychotherapy:  Target symptoms: depression, anxiety   Why chosen therapy is appropriate versus another modality: relevant to diagnosis  Outcome monitoring methods: self-report, observation  Therapeutic intervention type: supportive psychotherapy  Topics discussed/themes: building skills sets for symptom management, symptom recognition  The patient's response to the intervention is accepting. The patient's progress toward treatment goals is good.   Duration of intervention: 17 minutes.      Prior visit 03/18/2025:     Pt reports today: "I am well not issues"     Mood overall is "good"     Rates depression as 2/10 and anxiety as 4/10 over the past 2 weeks.     Patients mood is calm, affect appears relaxed. Linear " and logical, friendly and cooperative, good eye contact.     Denies SI/HI/AVH. Pt reports sleeping well and normal appetite. Denies side effects of medications.     Pt reports taking medications as prescribed and behaving appropriately during interview today. Patient states she is doing better since switching from extended release Adderall to the regular tablets, stating she is able to sleep with no issues. Patient denies anxiety or depression at this time.   Patient states her family,  and dog keep her going and is the reason she lives. :            Review of Systems     ROS    Psychiatric Review Of Systems - Is patient experiencing or having changes in:  sleep: yes  appetite: pregnant   weight: no  energy/anergy: yes  interest/pleasure/anhedonia: yes  somatic symptoms: no  libido: no  anxiety/panic: no  guilty/hopelessness: no  concentration: yes  S.I.B.s/risky behavior: no  Irritability: no  Racing thoughts: no  Impulsive behaviors: no  Paranoia: no  AVH: no      Past Medical, Family and Social History: The patient's past medical, family and social history have been reviewed and updated as appropriate within the electronic medical record - see encounter notes.      Current Medications:   Medication List with Changes/Refills   Current Medications    ALPRAZOLAM (XANAX) 0.5 MG TABLET    Take 1 tablet (0.5 mg total) by mouth daily as needed for Anxiety.    DEXTROAMPHETAMINE-AMPHETAMINE (ADDERALL) 20 MG TABLET    Take 2 tablets (40 mg total) by mouth once daily.    FLUOXETINE 20 MG TABLET    Take 1 tablet (20 mg total) by mouth once daily.    IBUPROFEN (ADVIL,MOTRIN) 600 MG TABLET    Take 1 tablet (600 mg total) by mouth daily as needed for Pain (headache).    NORGESTIMATE-ETHINYL ESTRADIOL (ORTHO TRI-CYCLEN LO) 0.18/0.215/0.25 MG-25 MCG TABLET    Take 1 tablet by mouth once daily.         Allergies:   Review of patient's allergies indicates:   Allergen Reactions    Cefdinir Diarrhea         Vitals   There were  "no vitals filed for this visit.       Labs/Imaging/Studies:   No results found for this or any previous visit (from the past 48 hours).   No results found for: "PHENYTOIN", "PHENOBARB", "VALPROATE", "CBMZ"    Compliance: yes    Side effects: None    Risk Parameters:  Patient reports no suicidal ideation  Patient reports no homicidal ideation  Patient reports no self-injurious behavior  Patient reports no violent behavior    Exam (detailed: at least 9 elements; comprehensive: all 15 elements)   Constitutional  Vitals:  Most recent vital signs, dated less than 90 days prior to this appointment, were reviewed.   There were no vitals filed for this visit.     General:  unremarkable, age appropriate, casually dressed, well dressed, neatly groomed     Musculoskeletal  Muscle Strength/Tone:  not examined   Gait & Station:  non-ataxic     Psychiatric  Speech:  no latency; no press   Mood & Affect:  steady, happy  congruent and appropriate   Thought Process:  normal and logical   Associations:  intact   Thought Content:  normal, no suicidality, no homicidality, delusions, or paranoia   Insight:  limited awareness of illness   Judgement: behavior is adequate to circumstances, age appropriate   Orientation:  grossly intact, person, place, situation, time/date, day of week, month of year, year   Memory: intact for content of interview   Language: grossly intact   Attention Span & Concentration:  able to focus   Fund of Knowledge:  intact and appropriate to age and level of education     Assessment and Diagnosis   Status/Progress: Based on the examination today, the patient's problem(s) is/are well controlled.  New problems have not been presented today.   Co-morbidities are not complicating management of the primary condition.  There are no active rule-out diagnoses for this patient at this time.     General Impression:    Generalized anxiety disorder, ADHD     Intervention/Counseling/Treatment Plan   Medication Management: " Continue current medications. The risks and benefits of medication were discussed with the patient.    -Continue Prozac 20 mg daily  Stop Adderall due to pregnancy.       The risks and benefits of medication were discussed with the patient.  Discussed diagnosis, risks and benefits of proposed treatment above vs alternative treatments vs no treatment, and potential side effects of these treatments. The patient expresses understanding of the above and displays the capacity to agree with this treatment given said understanding. Patient also agrees that, currently, the benefits outweigh the risks and would like to pursue treatment at this time.  Discussed inherent unpredictability of medications in each individual.   Encouraged Patient to keep future appointments.   Take medications as prescribed and abstain from substance abuse.   In the event of an emergency patient was advised to go to the emergency room      Return to Clinic: 3 months    LAWRENCE Romano       Total face to face time: 22 min        *This note has been prepared using a combination of a dictation device and typing.  It has been checked for errors but some errors may still exist within the note as a result of speech recognition errors and/or typographical errors.The patient location is: [place]  The chief complaint leading to consultation is: Generalized anxiety disorder, ADHD     Visit type: audiovisual    Face to Face time with patient: 22   minutes of total time spent on the encounter, which includes face to face time and non-face to face time preparing to see the patient (eg, review of tests), Obtaining and/or reviewing separately obtained history, Documenting clinical information in the electronic or other health record, Independently interpreting results (not separately reported) and communicating results to the patient/family/caregiver, or Care coordination (not separately reported).         Each patient to whom he or she provides medical  services by telemedicine is:  (1) informed of the relationship between the physician and patient and the respective role of any other health care provider with respect to management of the patient; and (2) notified that he or she may decline to receive medical services by telemedicine and may withdraw from such care at any time.    Notes: